# Patient Record
Sex: FEMALE | Race: WHITE | NOT HISPANIC OR LATINO | Employment: STUDENT | URBAN - METROPOLITAN AREA
[De-identification: names, ages, dates, MRNs, and addresses within clinical notes are randomized per-mention and may not be internally consistent; named-entity substitution may affect disease eponyms.]

---

## 2017-08-14 RX ORDER — VALACYCLOVIR HYDROCHLORIDE 500 MG/1
TABLET, FILM COATED ORAL
Qty: 30 TABLET | Refills: 0 | Status: SHIPPED | OUTPATIENT
Start: 2017-08-14 | End: 2018-08-23 | Stop reason: SDUPTHER

## 2018-06-21 ENCOUNTER — OFFICE VISIT (OUTPATIENT)
Dept: OPTOMETRY | Facility: CLINIC | Age: 23
End: 2018-06-21
Payer: COMMERCIAL

## 2018-06-21 DIAGNOSIS — H53.021 ANISOMETROPIC AMBLYOPIA OF RIGHT EYE: Primary | ICD-10-CM

## 2018-06-21 DIAGNOSIS — H52.31 ANISOMETROPIA: ICD-10-CM

## 2018-06-21 DIAGNOSIS — H52.13 MYOPIA WITH ASTIGMATISM, BILATERAL: ICD-10-CM

## 2018-06-21 DIAGNOSIS — H52.203 MYOPIA WITH ASTIGMATISM, BILATERAL: ICD-10-CM

## 2018-06-21 PROCEDURE — 99999 PR PBB SHADOW E&M-EST. PATIENT-LVL II: CPT | Mod: PBBFAC,,, | Performed by: OPTOMETRIST

## 2018-06-21 PROCEDURE — 92015 DETERMINE REFRACTIVE STATE: CPT | Mod: S$GLB,,, | Performed by: OPTOMETRIST

## 2018-06-21 PROCEDURE — 92014 COMPRE OPH EXAM EST PT 1/>: CPT | Mod: S$GLB,,, | Performed by: OPTOMETRIST

## 2018-06-21 RX ORDER — METHOCARBAMOL 500 MG/1
TABLET, FILM COATED ORAL
Refills: 0 | COMMUNITY
Start: 2018-05-05 | End: 2018-08-23

## 2018-06-21 RX ORDER — TIZANIDINE 4 MG/1
TABLET ORAL
Refills: 2 | COMMUNITY
Start: 2018-05-05 | End: 2022-09-09

## 2018-06-21 NOTE — PROGRESS NOTES
HPI     Ms. Michela No is here for her annual eye exam.  Pt is accompanied today by her mother.    Patient states no complaints about eyes, vision, or glasses today. She   reports clear vision all ranges with glasses.   Would patient like a refraction today? yes    (-)drops  (-)flashes  (-)floaters  (-)diplopia    OCULAR HISTORY  Last Eye Exam 08/08/16 with Dr. 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 (History)            Assessment /Plan     For exam results, see Encounter Report.    Anisometropic amblyopia of right eye  Anisometropia  Myopia with astigmatism, bilateral   Small change in refractive error OU. New glasses prescription released, adaptation expected.  New glasses optional.   Eyeglass Final Rx     Eyeglass Final Rx       Sphere Cylinder Axis    Right -4.00 +0.50 010    Left -2.00 +0.75 150    Type:  JIMBO    Expiration Date:  6/22/2019                 RTC 1 yr

## 2018-07-30 ENCOUNTER — TELEPHONE (OUTPATIENT)
Dept: ORTHOPEDICS | Facility: CLINIC | Age: 23
End: 2018-07-30

## 2018-07-30 NOTE — TELEPHONE ENCOUNTER
----- Message from Ellen Mendoza MA sent at 7/27/2018  3:07 PM CDT -----  Contact: patient      ----- Message -----  From: Tiffanie Colorado  Sent: 7/27/2018   2:28 PM  To: Ashley López Staff    Please call pt at [phone] today. Patient would like to get some medical advise from the provider only    Thank you

## 2018-08-06 RX ORDER — VALACYCLOVIR HYDROCHLORIDE 500 MG/1
TABLET, FILM COATED ORAL
Qty: 30 TABLET | Refills: 0 | OUTPATIENT
Start: 2018-08-06

## 2018-08-23 ENCOUNTER — OFFICE VISIT (OUTPATIENT)
Dept: FAMILY MEDICINE | Facility: CLINIC | Age: 23
End: 2018-08-23
Payer: COMMERCIAL

## 2018-08-23 VITALS
RESPIRATION RATE: 16 BRPM | DIASTOLIC BLOOD PRESSURE: 54 MMHG | BODY MASS INDEX: 20.58 KG/M2 | HEART RATE: 77 BPM | WEIGHT: 128.06 LBS | SYSTOLIC BLOOD PRESSURE: 102 MMHG | TEMPERATURE: 98 F | OXYGEN SATURATION: 98 % | HEIGHT: 66 IN

## 2018-08-23 DIAGNOSIS — Z23 NEED FOR HPV VACCINATION: ICD-10-CM

## 2018-08-23 DIAGNOSIS — Z11.8 SCREENING FOR CHLAMYDIAL DISEASE: ICD-10-CM

## 2018-08-23 DIAGNOSIS — A60.00 GENITAL HERPES SIMPLEX, UNSPECIFIED SITE: ICD-10-CM

## 2018-08-23 DIAGNOSIS — J06.9 UPPER RESPIRATORY TRACT INFECTION, UNSPECIFIED TYPE: Primary | ICD-10-CM

## 2018-08-23 PROCEDURE — 99999 PR PBB SHADOW E&M-EST. PATIENT-LVL IV: CPT | Mod: PBBFAC,,, | Performed by: PHYSICIAN ASSISTANT

## 2018-08-23 PROCEDURE — 90651 9VHPV VACCINE 2/3 DOSE IM: CPT | Mod: S$GLB,,, | Performed by: PHYSICIAN ASSISTANT

## 2018-08-23 PROCEDURE — 99214 OFFICE O/P EST MOD 30 MIN: CPT | Mod: 25,S$GLB,, | Performed by: PHYSICIAN ASSISTANT

## 2018-08-23 PROCEDURE — 3008F BODY MASS INDEX DOCD: CPT | Mod: CPTII,S$GLB,, | Performed by: PHYSICIAN ASSISTANT

## 2018-08-23 PROCEDURE — 90471 IMMUNIZATION ADMIN: CPT | Mod: S$GLB,,, | Performed by: PHYSICIAN ASSISTANT

## 2018-08-23 RX ORDER — VALACYCLOVIR HYDROCHLORIDE 500 MG/1
500 TABLET, FILM COATED ORAL DAILY
Qty: 30 TABLET | Refills: 0 | Status: SHIPPED | OUTPATIENT
Start: 2018-08-23 | End: 2018-10-04 | Stop reason: SDUPTHER

## 2018-08-23 RX ORDER — BENZONATATE 100 MG/1
100 CAPSULE ORAL 3 TIMES DAILY PRN
Qty: 30 CAPSULE | Refills: 0 | Status: SHIPPED | OUTPATIENT
Start: 2018-08-23 | End: 2018-09-02

## 2018-08-23 RX ORDER — METHYLPREDNISOLONE 4 MG/1
TABLET ORAL
Qty: 1 PACKAGE | Refills: 0 | Status: SHIPPED | OUTPATIENT
Start: 2018-08-23 | End: 2018-12-20

## 2018-08-23 NOTE — PROGRESS NOTES
Subjective:       Patient ID: Michela No is a 22 y.o. female.    Chief Complaint: Sinus Problem (cough, congestion)    Cough   This is a new problem. The current episode started 1 to 4 weeks ago. The problem has been unchanged. The problem occurs constantly. The cough is productive of sputum (green). Associated symptoms include nasal congestion, postnasal drip, rhinorrhea, shortness of breath and sweats. Pertinent negatives include no ear pain or fever. Treatments tried: cough syrup, PCN for 3 days BID. There is no history of asthma.     Social History     Socioeconomic History    Marital status: Single     Spouse name: Not on file    Number of children: Not on file    Years of education: Not on file    Highest education level: Not on file   Social Needs    Financial resource strain: Not on file    Food insecurity - worry: Not on file    Food insecurity - inability: Not on file    Transportation needs - medical: Not on file    Transportation needs - non-medical: Not on file   Occupational History    Not on file   Tobacco Use    Smoking status: Current Every Day Smoker     Packs/day: 0.50     Years: 6.00     Pack years: 3.00     Types: Cigarettes    Smokeless tobacco: Never Used    Tobacco comment: marijuana and cigarettes   Substance and Sexual Activity    Alcohol use: Yes     Comment: heavily    Drug use: Yes     Types: Methamphetamines, Marijuana     Comment: 4 months    Sexual activity: Not Currently     Partners: Male     Birth control/protection: None     Comment: condom during outbreak   Other Topics Concern    Patient feels they ought to cut down on drinking/drug use No    Patient annoyed by others criticizing their drinking/drug use No    Patient has felt bad or guilty about drinking/drug use No    Patient has had a drink/used drugs as an eye opener in the AM No   Social History Narrative    .She lives with both parents and younger brother. She works part[place]time [place] .Michela is now working at [employer] full time.She is living with her miriam and 2 other friends in an apt[place] the [place].       Review of Systems   Constitutional: Negative for fever.   HENT: Positive for postnasal drip and rhinorrhea. Negative for ear pain.    Respiratory: Positive for cough and shortness of breath.        Objective:      Physical Exam   Constitutional: She is oriented to person, place, and time. She appears well-developed and well-nourished.   HENT:   Head: Normocephalic and atraumatic.   Cardiovascular: Normal rate and regular rhythm.   Pulmonary/Chest: Effort normal and breath sounds normal. No stridor. No respiratory distress.   Neurological: She is alert and oriented to person, place, and time.   Skin: Skin is warm and dry. She is not diaphoretic.   Vitals reviewed.      Assessment:       1. Upper respiratory tract infection, unspecified type    2. Screening for chlamydial disease    3. Genital herpes simplex, unspecified site    4. Need for HPV vaccination        Plan:         Michela was seen today for sinus problem.    Diagnoses and all orders for this visit:    Upper respiratory tract infection, unspecified type  -     methylPREDNISolone (MEDROL DOSEPACK) 4 mg tablet; use as directed  -     benzonatate (TESSALON) 100 MG capsule; Take 1 capsule (100 mg total) by mouth 3 (three) times daily as needed for Cough.    Screening for chlamydial disease  -     Ambulatory referral to Obstetrics / Gynecology    Genital herpes simplex, unspecified site  -     valACYclovir (VALTREX) 500 MG tablet; Take 1 tablet (500 mg total) by mouth once daily.    Need for HPV vaccination  -     (In Office Administered) HPV Vaccine (9-Valent) (3 Dose) (IM)    Other orders  -     Cancel: Pneumococcal Polysaccharide Vaccine (23 Valent) (SQ/IM)

## 2018-08-23 NOTE — PROGRESS NOTES
Pt tolerated HPV vaccine to right deltoid without difficulty; no adverse reaction noted; VIS given

## 2018-09-10 ENCOUNTER — TELEPHONE (OUTPATIENT)
Dept: OBSTETRICS AND GYNECOLOGY | Facility: CLINIC | Age: 23
End: 2018-09-10

## 2018-09-10 NOTE — TELEPHONE ENCOUNTER
Tired to reach pt. To come in early due to  being out this afternoon, pt. Mom said she could not so she was advised to call and make another appt to come see us.

## 2018-10-04 DIAGNOSIS — A60.00 GENITAL HERPES SIMPLEX, UNSPECIFIED SITE: ICD-10-CM

## 2018-10-04 RX ORDER — VALACYCLOVIR HYDROCHLORIDE 500 MG/1
500 TABLET, FILM COATED ORAL DAILY
Qty: 30 TABLET | Refills: 0 | Status: SHIPPED | OUTPATIENT
Start: 2018-10-04 | End: 2018-12-03 | Stop reason: SDUPTHER

## 2018-12-03 DIAGNOSIS — A60.00 GENITAL HERPES SIMPLEX, UNSPECIFIED SITE: ICD-10-CM

## 2018-12-03 NOTE — TELEPHONE ENCOUNTER
----- Message from Azalia Mcfarland sent at 12/3/2018  4:50 PM CST -----  Contact: Self  Patient called to request medication refill. Please call at [phone]          valACYclovir (VALTREX) 500 MG tablet              [facility] [id number] [facility] [address]

## 2018-12-04 RX ORDER — VALACYCLOVIR HYDROCHLORIDE 500 MG/1
500 TABLET, FILM COATED ORAL DAILY
Qty: 30 TABLET | Refills: 0 | Status: SHIPPED | OUTPATIENT
Start: 2018-12-04 | End: 2018-12-20 | Stop reason: SDUPTHER

## 2018-12-20 ENCOUNTER — LAB VISIT (OUTPATIENT)
Dept: LAB | Facility: HOSPITAL | Age: 23
End: 2018-12-20
Payer: COMMERCIAL

## 2018-12-20 ENCOUNTER — OFFICE VISIT (OUTPATIENT)
Dept: OBSTETRICS AND GYNECOLOGY | Facility: CLINIC | Age: 23
End: 2018-12-20
Payer: COMMERCIAL

## 2018-12-20 VITALS
WEIGHT: 123 LBS | SYSTOLIC BLOOD PRESSURE: 122 MMHG | HEIGHT: 66 IN | BODY MASS INDEX: 19.77 KG/M2 | DIASTOLIC BLOOD PRESSURE: 68 MMHG

## 2018-12-20 DIAGNOSIS — A60.00 GENITAL HERPES SIMPLEX, UNSPECIFIED SITE: ICD-10-CM

## 2018-12-20 DIAGNOSIS — Z30.011 ENCOUNTER FOR INITIAL PRESCRIPTION OF CONTRACEPTIVE PILLS: ICD-10-CM

## 2018-12-20 DIAGNOSIS — Z11.3 SCREENING FOR STDS (SEXUALLY TRANSMITTED DISEASES): ICD-10-CM

## 2018-12-20 DIAGNOSIS — Z01.419 WELL WOMAN EXAM WITH ROUTINE GYNECOLOGICAL EXAM: Primary | ICD-10-CM

## 2018-12-20 LAB
HAV IGM SERPL QL IA: NEGATIVE
HBV CORE IGM SERPL QL IA: NEGATIVE
HBV SURFACE AG SERPL QL IA: NEGATIVE
HCV AB SERPL QL IA: NEGATIVE
HIV 1+2 AB+HIV1 P24 AG SERPL QL IA: NEGATIVE

## 2018-12-20 PROCEDURE — 87591 N.GONORRHOEAE DNA AMP PROB: CPT

## 2018-12-20 PROCEDURE — 86592 SYPHILIS TEST NON-TREP QUAL: CPT

## 2018-12-20 PROCEDURE — 99999 PR PBB SHADOW E&M-EST. PATIENT-LVL III: CPT | Mod: PBBFAC,,, | Performed by: NURSE PRACTITIONER

## 2018-12-20 PROCEDURE — 80074 ACUTE HEPATITIS PANEL: CPT

## 2018-12-20 PROCEDURE — 36415 COLL VENOUS BLD VENIPUNCTURE: CPT

## 2018-12-20 PROCEDURE — 99385 PREV VISIT NEW AGE 18-39: CPT | Mod: S$GLB,,, | Performed by: NURSE PRACTITIONER

## 2018-12-20 PROCEDURE — 88175 CYTOPATH C/V AUTO FLUID REDO: CPT

## 2018-12-20 PROCEDURE — 86703 HIV-1/HIV-2 1 RESULT ANTBDY: CPT

## 2018-12-20 RX ORDER — DESOGESTREL AND ETHINYL ESTRADIOL 0.15-0.03
1 KIT ORAL DAILY
Qty: 84 TABLET | Refills: 4 | Status: ON HOLD | OUTPATIENT
Start: 2018-12-20 | End: 2021-02-21 | Stop reason: HOSPADM

## 2018-12-20 RX ORDER — NAPROXEN SODIUM 550 MG/1
TABLET ORAL
Refills: 1 | COMMUNITY
Start: 2018-11-12 | End: 2022-09-09

## 2018-12-20 RX ORDER — VALACYCLOVIR HYDROCHLORIDE 500 MG/1
500 TABLET, FILM COATED ORAL DAILY
Qty: 90 TABLET | Refills: 4 | Status: SHIPPED | OUTPATIENT
Start: 2018-12-20 | End: 2019-01-07 | Stop reason: SDUPTHER

## 2018-12-20 NOTE — PROGRESS NOTES
"HISTORY OF PRESENT ILLNESS:    Michela No is a 23 y.o. female, , No LMP recorded.,  presents for a routine exam and to restart OCPs.  -States Kalina is not covered by her insurance, so ran out and is using "withdrawal" and needs Valtrex refills.   -Periods are irregular, sometimes 2-3 months apart without pills (hx PCOS).    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    Herpes simplex virus (HSV) infection     Mood disorder 2013    PCOS (polycystic ovarian syndrome)     as per pt    Personality disorder 2012    Pseudoseizures 2013    Seizures 2010    Self-harming behavior     Substance abuse        Past Surgical History:   Procedure Laterality Date    FOOT TENDON SURGERY         MEDICATIONS AND ALLERGIES:  Anaprox  Zanaflex  Valtrex    Review of patient's allergies indicates:   Allergen Reactions    Fish containing products Swelling       Family History   Adopted: Yes   Problem Relation Age of Onset    No Known Problems Mother     No Known Problems Father        Social History     Socioeconomic History    Marital status: Single     Spouse name: Not on file    Number of children: Not on file    Years of education: Not on file    Highest education level: Not on file   Social Needs    Financial resource strain: Not on file    Food insecurity - worry: Not on file    Food insecurity - inability: Not on file    Transportation needs - medical: Not on file    Transportation needs - non-medical: Not on file   Occupational History    Not on file   Tobacco Use    Smoking status: Current Every Day Smoker     Packs/day: 0.50     Years: 6.00     Pack years: 3.00     Types: Cigarettes    Smokeless tobacco: Never Used    " "Tobacco comment: marijuana and cigarettes   Substance and Sexual Activity    Alcohol use: Yes     Comment: heavily    Drug use: Yes     Types: Methamphetamines, Marijuana     Comment: 4 months    Sexual activity: Not Currently     Partners: Male     Birth control/protection: None     Comment: condom during outbreak   Other Topics Concern    Patient feels they ought to cut down on drinking/drug use No    Patient annoyed by others criticizing their drinking/drug use No    Patient has felt bad or guilty about drinking/drug use No    Patient has had a drink/used drugs as an eye opener in the AM No   Social History Narrative    .She lives with both parents and younger brother. She works part[place]time [place] .Michela is now working at [employer] full time.She is living with her fivanessae and 2 other friends in an apt[place].       OB HISTORY: None.    COMPREHENSIVE GYN HISTORY:  PAP History: Denies abnormal Paps. LAST PAP UNKNOWN.   Infection History: Reports STDs: HSV. Denies PID.  Benign History: Denies uterine fibroids. Denies ovarian cysts. Denies endometriosis. Reports PCOS.  Cancer History: Denies cervical cancer. Denies uterine cancer or hyperplasia. Denies ovarian cancer. Denies vulvar cancer or pre-cancer. Denies vaginal cancer or pre-cancer. Denies breast cancer. Denies colon cancer.  Sexual Activity History: Reports currently being sexually active  Menstrual History: Monthly. Mod then light flow.   Dysmenorrhea History: Reports mild dysmenorrhea.   Contraception: "withdrawal".    ROS:  GENERAL: No weight changes. No swelling. No fatigue. No fever.  CARDIOVASCULAR: No chest pain. No shortness of breath. No leg cramps.   NEUROLOGICAL: No headaches. No vision changes.  BREASTS: No pain. No lumps. No discharge.  ABDOMEN: No pain. No nausea. No vomiting. No diarrhea. No constipation.  REPRODUCTIVE: No abnormal bleeding.   VULVA: No pain. No lesions. No " "itching.  VAGINA: No relaxation. No itching. No odor. No discharge. No lesions.  URINARY: No incontinence. No nocturia. No frequency. No dysuria.    /68   Ht 5' 6" (1.676 m)   Wt 55.8 kg (123 lb)   BMI 19.85 kg/m²     PE:  APPEARANCE: Well nourished, well developed, in no acute distress.  AFFECT: WNL, alert and oriented x 3. ABNORMAL GAIT.  SKIN: No acne or hirsutism.  NECK: Neck symmetric, without masses or thyromegaly.  NODES: No inguinal, cervical, axillary or femoral lymph node enlargement.  CHEST: Good respiratory effort.   ABDOMEN: Soft. No tenderness or masses.   BREASTS: Symmetrical, no skin changes, visible lesions, palpable masses or nipple discharge bilaterally.  PELVIC: External female genitalia without lesions.  Female hair distribution. Adequate perineal body, Normal urethral meatus. Vagina moist and well rugated without lesions or discharge.  No significant cystocele or rectocele present. Cervix pink without lesions, discharge or tenderness. Uterus is 4-6 week size, regular, mobile and nontender. Adnexa without masses or tenderness.  EXTREMITIES: No edema    DIAGNOSIS:  1. Well woman exam with routine gynecological exam    2. Encounter for initial prescription of contraceptive pills    3. Screening for STDs (sexually transmitted diseases)    4. Genital herpes simplex, unspecified site        PLAN:    Orders Placed This Encounter    C. trachomatis/N. gonorrhoeae by AMP DNA    HIV 1/2 Ag/Ab (4th Gen)    RPR    Hepatitis panel, acute    Liquid-based pap smear, screening    desogestrel-ethinyl estradiol (APRI) 0.15-0.03 mg per tablet    valACYclovir (VALTREX) 500 MG tablet       COUNSELING:  The patient was counseled today on:  -OCP use and potential side effects;  -STDs and prevention including the Gardasil vaccine (has completed 3/3);  -A.C.S. Pap and pelvic exam guidelines (pap every 3 years);   -to follow up with her PCP for other health maintenance.    FOLLOW-UP for test results and " with Dr Camejo annually.

## 2018-12-21 LAB — RPR SER QL: NORMAL

## 2018-12-23 LAB
C TRACH DNA SPEC QL NAA+PROBE: NOT DETECTED
N GONORRHOEA DNA SPEC QL NAA+PROBE: NOT DETECTED

## 2019-01-07 DIAGNOSIS — A60.00 GENITAL HERPES SIMPLEX, UNSPECIFIED SITE: ICD-10-CM

## 2019-01-07 RX ORDER — VALACYCLOVIR HYDROCHLORIDE 500 MG/1
500 TABLET, FILM COATED ORAL DAILY
Qty: 90 TABLET | Refills: 4 | Status: ON HOLD | OUTPATIENT
Start: 2019-01-07 | End: 2021-02-21 | Stop reason: HOSPADM

## 2019-02-10 ENCOUNTER — PATIENT MESSAGE (OUTPATIENT)
Dept: OBSTETRICS AND GYNECOLOGY | Facility: HOSPITAL | Age: 24
End: 2019-02-10

## 2019-05-30 ENCOUNTER — OFFICE VISIT (OUTPATIENT)
Dept: FAMILY MEDICINE | Facility: CLINIC | Age: 24
End: 2019-05-30
Payer: COMMERCIAL

## 2019-05-30 VITALS
BODY MASS INDEX: 20.6 KG/M2 | OXYGEN SATURATION: 97 % | HEIGHT: 66 IN | WEIGHT: 128.19 LBS | HEART RATE: 96 BPM | TEMPERATURE: 98 F | DIASTOLIC BLOOD PRESSURE: 68 MMHG | SYSTOLIC BLOOD PRESSURE: 110 MMHG

## 2019-05-30 DIAGNOSIS — R09.82 PND (POST-NASAL DRIP): ICD-10-CM

## 2019-05-30 DIAGNOSIS — Z72.0 TOBACCO USE: ICD-10-CM

## 2019-05-30 DIAGNOSIS — R09.81 NASAL CONGESTION: ICD-10-CM

## 2019-05-30 DIAGNOSIS — J40 BRONCHITIS: Primary | ICD-10-CM

## 2019-05-30 PROCEDURE — 99999 PR PBB SHADOW E&M-EST. PATIENT-LVL III: CPT | Mod: PBBFAC,,, | Performed by: FAMILY MEDICINE

## 2019-05-30 PROCEDURE — 99214 PR OFFICE/OUTPT VISIT, EST, LEVL IV, 30-39 MIN: ICD-10-PCS | Mod: S$GLB,,, | Performed by: FAMILY MEDICINE

## 2019-05-30 PROCEDURE — 3008F BODY MASS INDEX DOCD: CPT | Mod: CPTII,S$GLB,, | Performed by: FAMILY MEDICINE

## 2019-05-30 PROCEDURE — 3008F PR BODY MASS INDEX (BMI) DOCUMENTED: ICD-10-PCS | Mod: CPTII,S$GLB,, | Performed by: FAMILY MEDICINE

## 2019-05-30 PROCEDURE — 99214 OFFICE O/P EST MOD 30 MIN: CPT | Mod: S$GLB,,, | Performed by: FAMILY MEDICINE

## 2019-05-30 PROCEDURE — 99999 PR PBB SHADOW E&M-EST. PATIENT-LVL III: ICD-10-PCS | Mod: PBBFAC,,, | Performed by: FAMILY MEDICINE

## 2019-05-30 RX ORDER — AZITHROMYCIN 250 MG/1
250 TABLET, FILM COATED ORAL DAILY
Qty: 6 TABLET | Refills: 0 | Status: ON HOLD | OUTPATIENT
Start: 2019-05-30 | End: 2021-02-21 | Stop reason: HOSPADM

## 2019-05-30 RX ORDER — LORATADINE 10 MG/1
10 TABLET ORAL DAILY
Qty: 30 TABLET | Refills: 2 | Status: SHIPPED | OUTPATIENT
Start: 2019-05-30 | End: 2022-09-09

## 2019-05-30 RX ORDER — BENZONATATE 200 MG/1
200 CAPSULE ORAL 3 TIMES DAILY PRN
Qty: 90 CAPSULE | Refills: 1 | Status: SHIPPED | OUTPATIENT
Start: 2019-05-30 | End: 2022-09-09

## 2019-05-30 RX ORDER — METHYLPREDNISOLONE 4 MG/1
TABLET ORAL
Qty: 1 PACKAGE | Refills: 0 | Status: SHIPPED | OUTPATIENT
Start: 2019-05-30 | End: 2022-09-09

## 2019-05-30 RX ORDER — CYCLOBENZAPRINE HCL 5 MG
TABLET ORAL
Refills: 5 | COMMUNITY
Start: 2019-03-08 | End: 2022-09-09

## 2019-05-30 RX ORDER — FLUTICASONE PROPIONATE 50 MCG
2 SPRAY, SUSPENSION (ML) NASAL DAILY
Qty: 1 BOTTLE | Refills: 2 | Status: SHIPPED | OUTPATIENT
Start: 2019-05-30 | End: 2022-09-09

## 2019-05-30 NOTE — PROGRESS NOTES
Office Visit    Patient Name: Micehla No    : 1995  MRN: [id number]      Assessment/Plan:  Michela No is a 23 y.o. female who presents today for :    Bronchitis  -     benzonatate (TESSALON) 200 MG capsule; Take 1 capsule (200 mg total) by mouth 3 (three) times daily as needed for Cough.  Dispense: 90 capsule; Refill: 1  -     azithromycin (ZITHROMAX Z-MIGUEL) 250 MG tablet; Take 1 tablet (250 mg total) by mouth once daily. Take medication as instructed on label.  Dispense: 6 tablet; Refill: 0  -     methylPREDNISolone (MEDROL DOSEPACK) 4 mg tablet; use as directed  Dispense: 1 Package; Refill: 0    PND (post-nasal drip)  -     fluticasone propionate (FLONASE) 50 mcg/actuation nasal spray; 2 sprays (100 mcg total) by Each Nare route once daily.  Dispense: 1 Bottle; Refill: 2  -     loratadine (CLARITIN) 10 mg tablet; Take 1 tablet (10 mg total) by mouth once daily.  Dispense: 30 tablet; Refill: 2    Nasal congestion    Tobacco use    -start Abx. Counseled pt that cough may persist for up to 2-3 weeks  -advised to continue supportive therapy and symptom management. May try Nasal Saline Rinses. Cepacol prn for sore throat. Claritin PRN for drainage  -advised to use air humidifier/filter at home, changing AC filters regularly, avoid smoking  -stressed hydration (water) and rest, as well as frequent hand washing and covering coughs to prevent spread of respiratory illnesses  -     Tylenol every 4-6 hours as needed for fever, headaches, sore throat, ear pain, bodyaches, and/or nasal/sinus inflammation  -RTC if Sx worsens or call clinic back if pt has any concerns.      Follow up for worsening Sx. Urgent care/ED precautions provided.     This note was created by combination of typed  and Dragon dictation.  Transcription errors may be present.  If there are any questions, please contact  me.      ----------------------------------------------------------------------------------------------------------------------      HPI:  Patient Care Team:  Elijah Beck MD as PCP - General (Family Medicine)    Michela is a 23 y.o. female with      Patient Active Problem List   Diagnosis    Personality disorder    Cerebral palsy with spastic/ataxic diplegia    Mood disorder    Pseudoseizures    Spondylolysis    Spondylisthesis    Low back pain    Genital herpes     This patient is new to me      Patient presents today for :  Sinusitis and Cough  for the past 6 days, mild productive cough of clear phlegm, +drainage. Pt denies facial pressure and pain. She does have sick contact with similar Sx. 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   Patient has not tried OTC meds at home. No sore throat.  No body aches.  No ear pain.  No F/C      Additional ROS    No dysphagia  No CP/SOB/palpitations/swelling  No nausea/vomiting/abd pain/no diarrhea  No rashes      Patient Active Problem List   Diagnosis    Personality disorder    Cerebral palsy with spastic/ataxic diplegia    Mood disorder    Pseudoseizures    Spondylolysis    Spondylisthesis    Low back pain    Genital herpes       Current Medications  Medications reviewed and updated.       Current Outpatient Medications:     cyclobenzaprine (FLEXERIL) 5 MG tablet, , Disp: , Rfl: 5    naproxen sodium (ANAPROX) 550 MG tablet, TK 1 T PO Q 12 H PRN, Disp: , Rfl: 1    tiZANidine (ZANAFLEX) 4 MG tablet, TK 2 TS PO HS, Disp: , Rfl: 2    valACYclovir (VALTREX) 500 MG tablet, Take 1 tablet (500 mg total) by mouth once daily., Disp: 90 tablet, Rfl: 4    azithromycin (ZITHROMAX Z-MIGUEL) 250 MG tablet, Take 1 tablet (250 mg total) by mouth once daily. Take medication as instructed on label., Disp: 6 tablet, Rfl: 0    benzonatate (TESSALON) 200 MG capsule, Take 1 capsule (200 mg total) by  mouth 3 (three) times daily as needed for Cough., Disp: 90 capsule, Rfl: 1    desogestrel-ethinyl estradiol (APRI) 0.15-0.03 mg per tablet, Take 1 tablet by mouth once daily., Disp: 84 tablet, Rfl: 4    fluticasone propionate (FLONASE) 50 mcg/actuation nasal spray, 2 sprays (100 mcg total) by Each Nare route once daily., Disp: 1 Bottle, Rfl: 2    loratadine (CLARITIN) 10 mg tablet, Take 1 tablet (10 mg total) by mouth once daily., Disp: 30 tablet, Rfl: 2    methylPREDNISolone (MEDROL DOSEPACK) 4 mg tablet, use as directed, Disp: 1 Package, Rfl: 0    Past Surgical History:   Procedure Laterality Date    FOOT TENDON SURGERY         Family History   Adopted: Yes   Problem Relation Age of Onset    No Known Problems Mother     No Known Problems Father        Social History     Socioeconomic History    Marital status: Single     Spouse name: Not on file    Number of children: Not on file    Years of education: Not on file    Highest education level: Not on file   Occupational History    Not on file   Social Needs    Financial resource strain: Not on file    Food insecurity:     Worry: Not on file     Inability: Not on file    Transportation needs:     Medical: Not on file     Non-medical: Not on file   Tobacco Use    Smoking status: Current Every Day Smoker     Packs/day: 0.50     Years: 6.00     Pack years: 3.00     Types: Cigarettes    Smokeless tobacco: Never Used    Tobacco comment: marijuana and cigarettes   Substance and Sexual Activity    Alcohol use: Yes     Comment: heavily    Drug use: Yes     Types: Methamphetamines, Marijuana     Comment: 4 months    Sexual activity: Not Currently     Partners: Male     Birth control/protection: None     Comment: condom during outbreak   Lifestyle    Physical activity:     Days per week: Not on file     Minutes per session: Not on file    Stress: Not on file   Relationships    Social connections:     Talks on phone: Not on file     Gets together: Not on  "file     Attends Alevism service: Not on file     Active member of club or organization: Not on file     Attends meetings of clubs or organizations: Not on file     Relationship status: Not on file   Other Topics Concern    Patient feels they ought to cut down on drinking/drug use No    Patient annoyed by others criticizing their drinking/drug use No    Patient has felt bad or guilty about drinking/drug use No    Patient has had a drink/used drugs as an eye opener in the AM No   Social History Narrative    .She lives with both parents and younger brother. She works part-time over the week ends [place] the [place] as a .Michela is now working at [employer] full time.She is living with her miriam and 2 other friends in an apt. [place] the [place].             Allergies   Review of patient's allergies indicates:   Allergen Reactions    Fish containing products Swelling    Hydrocodone              Review of Systems  See HPI      Physical Exam  /68   Pulse 96   Temp 97.7 °F (36.5 °C) (Oral)   Ht 5' 6" (1.676 m)   Wt 58.2 kg (128 lb 3.2 oz)   SpO2 97%   BMI 20.69 kg/m²       GEN: NAD, well developed  HEENT: NCAT, PERRLA, EOMI, sclera clear, anicteric, TM clear bilaterally with normal light reflex, mild nasal turbinate swelling, MMM with no lesions, O/P clear - no tonsillar swelling/discharge, +moderate drainage, +minimal clear nasal discharge, no frontal/maxillary TTP  NECK: normal, supple with midline trachea, no LAD, no thyromegaly  LUNGS: minimal scattered rhonchi bilaterally, no wheezing/rales, no increased work of breathing  HEART: RRR, normal S1 and S2, no m/r/g   ABD: s/nt/nd, NABS  SKIN: normal turgor, no rashes, no other lesions.   PSYCH: AOx3, appropriate mood and affect      "

## 2020-01-03 ENCOUNTER — PATIENT OUTREACH (OUTPATIENT)
Dept: ADMINISTRATIVE | Facility: OTHER | Age: 25
End: 2020-01-03

## 2020-01-06 ENCOUNTER — OFFICE VISIT (OUTPATIENT)
Dept: OPTOMETRY | Facility: CLINIC | Age: 25
End: 2020-01-06
Payer: COMMERCIAL

## 2020-01-06 DIAGNOSIS — H52.203 MYOPIA WITH ASTIGMATISM, BILATERAL: ICD-10-CM

## 2020-01-06 DIAGNOSIS — H52.31 ANISOMETROPIA: Primary | ICD-10-CM

## 2020-01-06 DIAGNOSIS — H52.13 MYOPIA WITH ASTIGMATISM, BILATERAL: ICD-10-CM

## 2020-01-06 PROCEDURE — 92014 PR EYE EXAM, EST PATIENT,COMPREHESV: ICD-10-PCS | Mod: S$GLB,,, | Performed by: OPTOMETRIST

## 2020-01-06 PROCEDURE — 92015 DETERMINE REFRACTIVE STATE: CPT | Mod: S$GLB,,, | Performed by: OPTOMETRIST

## 2020-01-06 PROCEDURE — 92014 COMPRE OPH EXAM EST PT 1/>: CPT | Mod: S$GLB,,, | Performed by: OPTOMETRIST

## 2020-01-06 PROCEDURE — 92015 PR REFRACTION: ICD-10-PCS | Mod: S$GLB,,, | Performed by: OPTOMETRIST

## 2020-01-06 PROCEDURE — 99999 PR PBB SHADOW E&M-EST. PATIENT-LVL II: ICD-10-PCS | Mod: PBBFAC,,, | Performed by: OPTOMETRIST

## 2020-01-06 PROCEDURE — 99999 PR PBB SHADOW E&M-EST. PATIENT-LVL II: CPT | Mod: PBBFAC,,, | Performed by: OPTOMETRIST

## 2020-01-06 NOTE — PROGRESS NOTES
HPI     25 y/o female is here today for a new glasses, last pair were broken she   works as a  and security on [employer]  Last eye exam 6/21/18 Dr Hoskins  Anisometropia amblyopia  Patching in 2 nd grade  Cerebral palsy  Floaters ou no flashes  np itching burning or tearing, except when tired  Denies diplopia    Last edited by Nella Alva on 1/6/2020 11:09 AM. (History)            Assessment /Plan     For exam results, see Encounter Report.    Anisometropia    Myopia with astigmatism, bilateral      Updated SRx. Minimal change from habitual. H/o aniso. H/o amblyopia OD with patching in the past.   Discussed possible adaptation symptoms with new SRx. Monitor yearly.       RTC in 1 year for annual eye exam or prn.

## 2020-07-09 ENCOUNTER — TELEPHONE (OUTPATIENT)
Dept: OBSTETRICS AND GYNECOLOGY | Facility: CLINIC | Age: 25
End: 2020-07-09

## 2020-07-09 ENCOUNTER — PATIENT OUTREACH (OUTPATIENT)
Dept: ADMINISTRATIVE | Facility: OTHER | Age: 25
End: 2020-07-09

## 2020-07-09 NOTE — TELEPHONE ENCOUNTER
ANDREAS for pt to call office to reschedule appointment for tomorrow at 1000 due to the fact that Dr. Bhat will be in surgery on Monday.

## 2020-07-09 NOTE — PROGRESS NOTES
Requested updates within Care Everywhere.  Patient's chart was reviewed for overdue CELIA topics.  Immunizations reconciled.

## 2020-07-10 ENCOUNTER — OFFICE VISIT (OUTPATIENT)
Dept: OBSTETRICS AND GYNECOLOGY | Facility: CLINIC | Age: 25
End: 2020-07-10
Payer: COMMERCIAL

## 2020-07-10 VITALS
WEIGHT: 116.88 LBS | DIASTOLIC BLOOD PRESSURE: 72 MMHG | BODY MASS INDEX: 18.86 KG/M2 | SYSTOLIC BLOOD PRESSURE: 100 MMHG

## 2020-07-10 DIAGNOSIS — F19.11 HISTORY OF SUBSTANCE ABUSE: ICD-10-CM

## 2020-07-10 DIAGNOSIS — F19.10 POLYSUBSTANCE ABUSE: ICD-10-CM

## 2020-07-10 DIAGNOSIS — G80.2 SPASTIC HEMIPLEGIC CEREBRAL PALSY: ICD-10-CM

## 2020-07-10 DIAGNOSIS — Z72.0 TOBACCO ABUSE: ICD-10-CM

## 2020-07-10 DIAGNOSIS — Z32.01 PREGNANCY CONFIRMED BY POSITIVE URINE TEST: Primary | ICD-10-CM

## 2020-07-10 DIAGNOSIS — Z11.3 SCREEN FOR STD (SEXUALLY TRANSMITTED DISEASE): ICD-10-CM

## 2020-07-10 DIAGNOSIS — A60.00 GENITAL HERPES SIMPLEX, UNSPECIFIED SITE: ICD-10-CM

## 2020-07-10 DIAGNOSIS — F44.5 PSEUDOSEIZURES: Chronic | ICD-10-CM

## 2020-07-10 PROCEDURE — 87480 CANDIDA DNA DIR PROBE: CPT

## 2020-07-10 PROCEDURE — 99999 PR PBB SHADOW E&M-EST. PATIENT-LVL III: ICD-10-PCS | Mod: PBBFAC,,, | Performed by: OBSTETRICS & GYNECOLOGY

## 2020-07-10 PROCEDURE — 99214 PR OFFICE/OUTPT VISIT, EST, LEVL IV, 30-39 MIN: ICD-10-PCS | Mod: S$GLB,,, | Performed by: OBSTETRICS & GYNECOLOGY

## 2020-07-10 PROCEDURE — 99999 PR PBB SHADOW E&M-EST. PATIENT-LVL III: CPT | Mod: PBBFAC,,, | Performed by: OBSTETRICS & GYNECOLOGY

## 2020-07-10 PROCEDURE — 80307 DRUG TEST PRSMV CHEM ANLYZR: CPT

## 2020-07-10 PROCEDURE — 99214 OFFICE O/P EST MOD 30 MIN: CPT | Mod: S$GLB,,, | Performed by: OBSTETRICS & GYNECOLOGY

## 2020-07-10 PROCEDURE — 87491 CHLMYD TRACH DNA AMP PROBE: CPT

## 2020-07-10 PROCEDURE — 3008F BODY MASS INDEX DOCD: CPT | Mod: CPTII,S$GLB,, | Performed by: OBSTETRICS & GYNECOLOGY

## 2020-07-10 PROCEDURE — 3008F PR BODY MASS INDEX (BMI) DOCUMENTED: ICD-10-PCS | Mod: CPTII,S$GLB,, | Performed by: OBSTETRICS & GYNECOLOGY

## 2020-07-10 PROCEDURE — 87510 GARDNER VAG DNA DIR PROBE: CPT

## 2020-07-10 RX ORDER — FOLIC ACID 1 MG/1
4 TABLET ORAL DAILY
Qty: 120 TABLET | Refills: 7 | Status: SHIPPED | OUTPATIENT
Start: 2020-07-10 | End: 2022-09-09

## 2020-07-10 NOTE — PROGRESS NOTES
SUBJECTIVE:   24 y.o. female   for pregnancy confirmation    Patient's last menstrual period was 2020..    Pt with positive UPT at home about 1 week ago,   Not planned pregnancy but very excited, thought she could not get pregnancy due to h/o pcos and retroverted uterus  Pt with h/o genital herpes  And CP due to trauma as new born, she also has seizure d/o but not on anything for seizure  She is seeing her neurologist at [facility] on routine basis (next visit in 1-2 weeks) and get botox injection for her spaticity.  Last seizure was about 3-4 months ago per pt.  Pt also used MJ and amphetamine (about 1 g/week) and smokes cigarette about 1ppd.  Since she found out she is pregnant, she has been cutting everything down on her own  Pt had spotting x 1 episode about 2-3 weeks ago.  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    Herpes simplex virus (HSV) infection     Mood disorder 2013    PCOS (polycystic ovarian syndrome)     as per pt    Personality disorder 2012    Pseudoseizures 2013    Seizures 2010    Self-harming behavior     Substance abuse      Past Surgical History:   Procedure Laterality Date    FOOT TENDON SURGERY       Social History     Socioeconomic History    Marital status: Single     Spouse name: Not on file    Number of children: Not on file    Years of education: Not on file    Highest education level: Not on file   Occupational History    Not on file    Social Needs    Financial resource strain: Not on file    Food insecurity     Worry: Not on file     Inability: Not on file    Transportation needs     Medical: Not on file     Non-medical: Not on file   Tobacco Use    Smoking status: Current Every Day Smoker     Packs/day: 0.50     Years: 6.00     Pack years: 3.00     Types: Cigarettes    Smokeless tobacco: Never Used    Tobacco comment: marijuana and cigarettes   Substance and Sexual Activity    Alcohol use: Yes     Comment: heavily    Drug use: Yes     Types: Methamphetamines, Marijuana     Comment: 4 months    Sexual activity: Not Currently     Partners: Male     Birth control/protection: None     Comment: condom during outbreak   Lifestyle    Physical activity     Days per week: Not on file     Minutes per session: Not on file    Stress: Not on file   Relationships    Social connections     Talks on phone: Not on file     Gets together: Not on file     Attends Rastafari service: Not on file     Active member of club or organization: Not on file     Attends meetings of clubs or organizations: Not on file     Relationship status: Not on file   Other Topics Concern    Patient feels they ought to cut down on drinking/drug use No    Patient annoyed by others criticizing their drinking/drug use No    Patient has felt bad or guilty about drinking/drug use No    Patient has had a drink/used drugs as an eye opener in the AM No   Social History Narrative    .She lives with both parents and younger brother. [place] .Michela is now working at [employer] full time.She is living with her fiancee and 2 other friends in [place]     Family History   Adopted: Yes   Problem Relation Age of Onset    No Known Problems Mother     No Known Problems Father     Glaucoma Neg Hx          Current Outpatient Medications   Medication Sig Dispense Refill    valACYclovir (VALTREX) 500 MG  tablet Take 1 tablet (500 mg total) by mouth once daily. 90 tablet 4    azithromycin (ZITHROMAX Z-MIGUEL) 250 MG tablet Take 1 tablet (250 mg total) by mouth once daily. Take medication as instructed on label. (Patient not taking: Reported on 1/6/2020) 6 tablet 0    benzonatate (TESSALON) 200 MG capsule Take 1 capsule (200 mg total) by mouth 3 (three) times daily as needed for Cough. (Patient not taking: Reported on 1/6/2020) 90 capsule 1    cyclobenzaprine (FLEXERIL) 5 MG tablet   5    desogestrel-ethinyl estradiol (APRI) 0.15-0.03 mg per tablet Take 1 tablet by mouth once daily. 84 tablet 4    fluticasone propionate (FLONASE) 50 mcg/actuation nasal spray 2 sprays (100 mcg total) by Each Nare route once daily. (Patient not taking: Reported on 1/6/2020) 1 Bottle 2    folic acid (FOLVITE) 1 MG tablet Take 4 tablets (4 mg total) by mouth once daily. 120 tablet 7    loratadine (CLARITIN) 10 mg tablet Take 1 tablet (10 mg total) by mouth once daily. (Patient not taking: Reported on 1/6/2020) 30 tablet 2    methylPREDNISolone (MEDROL DOSEPACK) 4 mg tablet use as directed (Patient not taking: Reported on 1/6/2020) 1 Package 0    naproxen sodium (ANAPROX) 550 MG tablet TK 1 T PO Q 12 H PRN  1    tiZANidine (ZANAFLEX) 4 MG tablet TK 2 TS PO HS  2     No current facility-administered medications for this visit.      Allergies: Fish containing products and Hydrocodone     ROS:  GENERAL: Denies weight gain or weight loss. Feeling well overall.   SKIN: Denies rash or lesions.   HEAD: Denies head injury or headache.   NODES: Denies enlarged lymph nodes.   CHEST: Denies chest pain or shortness of breath.   CARDIOVASCULAR: Denies palpitations or left sided chest pain.   ABDOMEN: No abdominal pain, constipation, diarrhea, nausea, vomiting or rectal bleeding.   URINARY: No frequency, dysuria, hematuria, or burning on urination.  REPRODUCTIVE: see HPI  BREASTS: The patient performs breast self-examination and denies pain,  lumps, or nipple discharge.   HEMATOLOGIC: No easy bruisability or excessive bleeding.  MUSCULOSKELETAL: see HPI  NEUROLOGIC: Denies syncope or weakness.   PSYCHIATRIC: Denies depression, anxiety or mood swings.      OBJECTIVE:   /72   Wt 53 kg (116 lb 13.5 oz)   LMP 04/29/2020   BMI 18.86 kg/m²   The patient appears well, alert, oriented x 3, in no distress. Poor hygienes  NECK: negative, no thyromegaly, trachea midline  SKIN: normal, good color, good turgor and no acne, striae, hirsutism  BREAST EXAM: not examined  ABDOMEN: soft, non-tender; bowel sounds normal; no masses,  no organomegaly and no hernias, masses, or hepatosplenomegaly  BLADDER: soft  GENITALIA: normal external genitalia, no erythema, no discharge  URETHRA: normal appearing urethra with no masses, tenderness or lesions and normal urethra, normal urethral meatus  VAGINA: Normal  CERVIX: no lesions or cervical motion tenderness  UTERUS: retroverted  ADNEXA: no mass, fullness, tenderness    Limited abdominal US:  Small GS seen, likely in utero as imaging is poor.  No CRL seen    ASSESSMENT:   1.  Pregnancy confirmed by urine:  Continue PNV.  Discussed vit B6 and unisome  Gc/ct and affirm done. Pap UTD.  rx for folic acid.  Dating US with MFM.  Bleeding precautions given  2.  Seizure d/o:  Pt to f/u with neurologist asap.  rx for folic acid 4mg daily sent  3.  CP:  Pt to f/u with neurologist to see if necessary to continue with botox in pregnancy.  On zanaflex (cat C).  Pt to discuss with Neuro as well  4.  Polysubstance abuse:  utox today.  Advised to stop as it can adversely affect her pregnancy  5.  Tobacco abuse:  Discussed cessation. Discussed adverse effect of pregnancy associated with smoking  6.  Genital herpes:  Need valtrex suppression at 35-36 week  7.  F/u in 4 weeks for initial OB visit

## 2020-07-11 LAB
AMPHET+METHAMPHET UR QL: NORMAL
BARBITURATES UR QL SCN>200 NG/ML: NEGATIVE
BENZODIAZ UR QL SCN>200 NG/ML: NEGATIVE
BZE UR QL SCN: NEGATIVE
CANNABINOIDS UR QL SCN: NORMAL
CREAT UR-MCNC: 155 MG/DL (ref 15–325)
ETHANOL UR-MCNC: <10 MG/DL
METHADONE UR QL SCN>300 NG/ML: NEGATIVE
OPIATES UR QL SCN: NEGATIVE
PCP UR QL SCN>25 NG/ML: NEGATIVE
TOXICOLOGY INFORMATION: NORMAL

## 2020-07-15 LAB
C TRACH DNA SPEC QL NAA+PROBE: NOT DETECTED
CANDIDA RRNA VAG QL PROBE: NOT DETECTED
G VAGINALIS RRNA GENITAL QL PROBE: NOT DETECTED
N GONORRHOEA DNA SPEC QL NAA+PROBE: NOT DETECTED
T VAGINALIS RRNA GENITAL QL PROBE: NOT DETECTED

## 2020-07-31 ENCOUNTER — PROCEDURE VISIT (OUTPATIENT)
Dept: MATERNAL FETAL MEDICINE | Facility: CLINIC | Age: 25
End: 2020-07-31
Payer: COMMERCIAL

## 2020-07-31 DIAGNOSIS — Z32.01 PREGNANCY CONFIRMED BY POSITIVE URINE TEST: ICD-10-CM

## 2020-07-31 PROCEDURE — 76801 PR US, OB <14WKS, TRANSABD, SINGLE GESTATION: ICD-10-PCS | Mod: S$GLB,,, | Performed by: OBSTETRICS & GYNECOLOGY

## 2020-07-31 PROCEDURE — 76801 OB US < 14 WKS SINGLE FETUS: CPT | Mod: S$GLB,,, | Performed by: OBSTETRICS & GYNECOLOGY

## 2020-08-05 ENCOUNTER — PATIENT OUTREACH (OUTPATIENT)
Dept: ADMINISTRATIVE | Facility: OTHER | Age: 25
End: 2020-08-05

## 2020-08-07 ENCOUNTER — INITIAL PRENATAL (OUTPATIENT)
Dept: OBSTETRICS AND GYNECOLOGY | Facility: CLINIC | Age: 25
End: 2020-08-07
Payer: COMMERCIAL

## 2020-08-07 VITALS — DIASTOLIC BLOOD PRESSURE: 62 MMHG | WEIGHT: 124.56 LBS | SYSTOLIC BLOOD PRESSURE: 110 MMHG | BODY MASS INDEX: 20.1 KG/M2

## 2020-08-07 DIAGNOSIS — Z3A.10 10 WEEKS GESTATION OF PREGNANCY: ICD-10-CM

## 2020-08-07 DIAGNOSIS — Z34.01 SUPERVISION OF NORMAL FIRST PREGNANCY IN FIRST TRIMESTER: Primary | ICD-10-CM

## 2020-08-07 PROCEDURE — 99999 PR PBB SHADOW E&M-EST. PATIENT-LVL III: ICD-10-PCS | Mod: PBBFAC,,, | Performed by: OBSTETRICS & GYNECOLOGY

## 2020-08-07 PROCEDURE — 87086 URINE CULTURE/COLONY COUNT: CPT

## 2020-08-07 PROCEDURE — 87147 CULTURE TYPE IMMUNOLOGIC: CPT

## 2020-08-07 PROCEDURE — 87088 URINE BACTERIA CULTURE: CPT

## 2020-08-07 PROCEDURE — 80307 DRUG TEST PRSMV CHEM ANLYZR: CPT

## 2020-08-07 PROCEDURE — 0500F PR INITIAL PRENATAL CARE VISIT: ICD-10-PCS | Mod: S$GLB,,, | Performed by: OBSTETRICS & GYNECOLOGY

## 2020-08-07 PROCEDURE — 0500F INITIAL PRENATAL CARE VISIT: CPT | Mod: S$GLB,,, | Performed by: OBSTETRICS & GYNECOLOGY

## 2020-08-07 PROCEDURE — 99999 PR PBB SHADOW E&M-EST. PATIENT-LVL III: CPT | Mod: PBBFAC,,, | Performed by: OBSTETRICS & GYNECOLOGY

## 2020-08-07 NOTE — PROGRESS NOTES
10w3d. Pt reports no complaints.  Admits to drug use, but reports that she is now clean.  Denies contractions, vaginal bleeding, or leakage of fluid.   Trisomy screening:counseled on Maternity 21  F/U 4 weeks. Next visit: routine care     1. Supervision of normal first pregnancy in first trimester  - Cystic Fibrosis Mutation Panel; Future  - HIV 1/2 Ag/Ab (4th Gen); Future  - RPR; Future  - Hepatitis B surface antigen; Future  - Type & Screen; Future  - Rubella antibody, IgG; Future  - CBC auto differential; Future  - Basic metabolic panel; Future  - PNV,calcium 72-iron-folic acid (PRENATAL VITAMIN PLUS LOW IRON) 27 mg iron- 1 mg Tab; Take one tablet daily.  Prescribe prenatal covered by insurance  Dispense: 90 tablet; Refill: 11  - Urine culture  - Toxicology screen, urine

## 2020-08-08 LAB
AMPHET+METHAMPHET UR QL: NEGATIVE
BARBITURATES UR QL SCN>200 NG/ML: NEGATIVE
BENZODIAZ UR QL SCN>200 NG/ML: NEGATIVE
BZE UR QL SCN: NEGATIVE
CANNABINOIDS UR QL SCN: NORMAL
CREAT UR-MCNC: 91 MG/DL (ref 15–325)
ETHANOL UR-MCNC: <10 MG/DL
METHADONE UR QL SCN>300 NG/ML: NEGATIVE
OPIATES UR QL SCN: NEGATIVE
PCP UR QL SCN>25 NG/ML: NEGATIVE
TOXICOLOGY INFORMATION: NORMAL

## 2020-08-09 LAB — BACTERIA UR CULT: ABNORMAL

## 2020-08-10 ENCOUNTER — PATIENT MESSAGE (OUTPATIENT)
Dept: OBSTETRICS AND GYNECOLOGY | Facility: CLINIC | Age: 25
End: 2020-08-10

## 2020-08-10 DIAGNOSIS — R82.71 GBS BACTERIURIA: Primary | ICD-10-CM

## 2020-08-10 RX ORDER — CEPHALEXIN 500 MG/1
500 CAPSULE ORAL EVERY 12 HOURS
Qty: 20 CAPSULE | Refills: 0 | Status: SHIPPED | OUTPATIENT
Start: 2020-08-10 | End: 2020-08-20

## 2020-09-04 ENCOUNTER — ROUTINE PRENATAL (OUTPATIENT)
Dept: OBSTETRICS AND GYNECOLOGY | Facility: CLINIC | Age: 25
End: 2020-09-04
Payer: COMMERCIAL

## 2020-09-04 VITALS
DIASTOLIC BLOOD PRESSURE: 64 MMHG | SYSTOLIC BLOOD PRESSURE: 112 MMHG | BODY MASS INDEX: 20.32 KG/M2 | WEIGHT: 125.88 LBS

## 2020-09-04 DIAGNOSIS — A60.09 GENITAL HERPES SIMPLEX VIRUS (HSV) INFECTION IN MOTHER AFFECTING PREGNANCY: ICD-10-CM

## 2020-09-04 DIAGNOSIS — Z3A.14 14 WEEKS GESTATION OF PREGNANCY: ICD-10-CM

## 2020-09-04 DIAGNOSIS — O98.319 GENITAL HERPES SIMPLEX VIRUS (HSV) INFECTION IN MOTHER AFFECTING PREGNANCY: ICD-10-CM

## 2020-09-04 DIAGNOSIS — O99.820 GBS (GROUP B STREPTOCOCCUS CARRIER), +RV CULTURE, CURRENTLY PREGNANT: Primary | ICD-10-CM

## 2020-09-04 PROCEDURE — 0502F PR SUBSEQUENT PRENATAL CARE: ICD-10-PCS | Mod: CPTII,S$GLB,, | Performed by: OBSTETRICS & GYNECOLOGY

## 2020-09-04 PROCEDURE — 80307 DRUG TEST PRSMV CHEM ANLYZR: CPT

## 2020-09-04 PROCEDURE — 87086 URINE CULTURE/COLONY COUNT: CPT

## 2020-09-04 PROCEDURE — 99999 PR PBB SHADOW E&M-EST. PATIENT-LVL III: CPT | Mod: PBBFAC,,, | Performed by: OBSTETRICS & GYNECOLOGY

## 2020-09-04 PROCEDURE — 0502F SUBSEQUENT PRENATAL CARE: CPT | Mod: CPTII,S$GLB,, | Performed by: OBSTETRICS & GYNECOLOGY

## 2020-09-04 PROCEDURE — 99999 PR PBB SHADOW E&M-EST. PATIENT-LVL III: ICD-10-PCS | Mod: PBBFAC,,, | Performed by: OBSTETRICS & GYNECOLOGY

## 2020-09-04 RX ORDER — VALACYCLOVIR HYDROCHLORIDE 1 G/1
1000 TABLET, FILM COATED ORAL DAILY
Qty: 90 TABLET | Refills: 3 | Status: ON HOLD | OUTPATIENT
Start: 2020-09-04 | End: 2021-02-21 | Stop reason: HOSPADM

## 2020-09-04 NOTE — PROGRESS NOTES
14w3d. Pt reports no complaints.  Admits to drug use, but reports that she is now clean.  Denies contractions, vaginal bleeding, or leakage of fluid.   Trisomy screening:CFDNA ordered today.  F/U 4 weeks. Next visit: routine care     1. GBS (group B Streptococcus carrier), +RV culture, currently pregnant  - Urine culture  - Toxicology screen, urine    2. Genital herpes simplex virus (HSV) infection in mother affecting pregnancy  - valACYclovir (VALTREX) 1000 MG tablet; Take 1 tablet (1,000 mg total) by mouth once daily.  Dispense: 90 tablet; Refill: 3    3. 14 weeks gestation of pregnancy

## 2020-09-05 LAB
AMPHET+METHAMPHET UR QL: NEGATIVE
BARBITURATES UR QL SCN>200 NG/ML: NEGATIVE
BENZODIAZ UR QL SCN>200 NG/ML: NEGATIVE
BZE UR QL SCN: NEGATIVE
CANNABINOIDS UR QL SCN: NORMAL
CREAT UR-MCNC: 50 MG/DL (ref 15–325)
ETHANOL UR-MCNC: <10 MG/DL
METHADONE UR QL SCN>300 NG/ML: NEGATIVE
OPIATES UR QL SCN: NEGATIVE
PCP UR QL SCN>25 NG/ML: NEGATIVE
TOXICOLOGY INFORMATION: NORMAL

## 2020-09-06 LAB — BACTERIA UR CULT: NORMAL

## 2020-10-05 ENCOUNTER — PATIENT MESSAGE (OUTPATIENT)
Dept: ADMINISTRATIVE | Facility: HOSPITAL | Age: 25
End: 2020-10-05

## 2020-10-06 ENCOUNTER — LAB VISIT (OUTPATIENT)
Dept: LAB | Facility: HOSPITAL | Age: 25
End: 2020-10-06
Attending: OBSTETRICS & GYNECOLOGY
Payer: COMMERCIAL

## 2020-10-06 ENCOUNTER — ROUTINE PRENATAL (OUTPATIENT)
Dept: OBSTETRICS AND GYNECOLOGY | Facility: CLINIC | Age: 25
End: 2020-10-06
Payer: COMMERCIAL

## 2020-10-06 VITALS — WEIGHT: 140 LBS | BODY MASS INDEX: 22.6 KG/M2 | SYSTOLIC BLOOD PRESSURE: 118 MMHG | DIASTOLIC BLOOD PRESSURE: 54 MMHG

## 2020-10-06 DIAGNOSIS — A60.09 GENITAL HERPES SIMPLEX VIRUS (HSV) INFECTION IN MOTHER AFFECTING PREGNANCY: ICD-10-CM

## 2020-10-06 DIAGNOSIS — Z3A.19 19 WEEKS GESTATION OF PREGNANCY: ICD-10-CM

## 2020-10-06 DIAGNOSIS — Z34.01 SUPERVISION OF NORMAL FIRST PREGNANCY IN FIRST TRIMESTER: ICD-10-CM

## 2020-10-06 DIAGNOSIS — O99.820 GBS (GROUP B STREPTOCOCCUS CARRIER), +RV CULTURE, CURRENTLY PREGNANT: Primary | ICD-10-CM

## 2020-10-06 DIAGNOSIS — O98.319 GENITAL HERPES SIMPLEX VIRUS (HSV) INFECTION IN MOTHER AFFECTING PREGNANCY: ICD-10-CM

## 2020-10-06 LAB
ABO + RH BLD: NORMAL
ANION GAP SERPL CALC-SCNC: 10 MMOL/L (ref 8–16)
BASOPHILS # BLD AUTO: 0.05 K/UL (ref 0–0.2)
BASOPHILS NFR BLD: 0.4 % (ref 0–1.9)
BLD GP AB SCN CELLS X3 SERPL QL: NORMAL
BUN SERPL-MCNC: 6 MG/DL (ref 6–20)
CALCIUM SERPL-MCNC: 9 MG/DL (ref 8.7–10.5)
CHLORIDE SERPL-SCNC: 105 MMOL/L (ref 95–110)
CO2 SERPL-SCNC: 20 MMOL/L (ref 23–29)
CREAT SERPL-MCNC: 0.5 MG/DL (ref 0.5–1.4)
DIFFERENTIAL METHOD: ABNORMAL
EOSINOPHIL # BLD AUTO: 0.2 K/UL (ref 0–0.5)
EOSINOPHIL NFR BLD: 1.3 % (ref 0–8)
ERYTHROCYTE [DISTWIDTH] IN BLOOD BY AUTOMATED COUNT: 12.1 % (ref 11.5–14.5)
EST. GFR  (AFRICAN AMERICAN): >60 ML/MIN/1.73 M^2
EST. GFR  (NON AFRICAN AMERICAN): >60 ML/MIN/1.73 M^2
GLUCOSE SERPL-MCNC: 91 MG/DL (ref 70–110)
HCT VFR BLD AUTO: 32.9 % (ref 37–48.5)
HGB BLD-MCNC: 11.2 G/DL (ref 12–16)
IMM GRANULOCYTES # BLD AUTO: 0.1 K/UL (ref 0–0.04)
IMM GRANULOCYTES NFR BLD AUTO: 0.8 % (ref 0–0.5)
LYMPHOCYTES # BLD AUTO: 1.8 K/UL (ref 1–4.8)
LYMPHOCYTES NFR BLD: 14 % (ref 18–48)
MCH RBC QN AUTO: 32.9 PG (ref 27–31)
MCHC RBC AUTO-ENTMCNC: 34 G/DL (ref 32–36)
MCV RBC AUTO: 97 FL (ref 82–98)
MONOCYTES # BLD AUTO: 0.9 K/UL (ref 0.3–1)
MONOCYTES NFR BLD: 7.4 % (ref 4–15)
NEUTROPHILS # BLD AUTO: 9.5 K/UL (ref 1.8–7.7)
NEUTROPHILS NFR BLD: 76.1 % (ref 38–73)
NRBC BLD-RTO: 0 /100 WBC
PLATELET # BLD AUTO: 219 K/UL (ref 150–350)
PMV BLD AUTO: 8.7 FL (ref 9.2–12.9)
POTASSIUM SERPL-SCNC: 3.7 MMOL/L (ref 3.5–5.1)
RBC # BLD AUTO: 3.4 M/UL (ref 4–5.4)
RH BLD: NORMAL
SODIUM SERPL-SCNC: 135 MMOL/L (ref 136–145)
WBC # BLD AUTO: 12.49 K/UL (ref 3.9–12.7)

## 2020-10-06 PROCEDURE — 80048 BASIC METABOLIC PNL TOTAL CA: CPT

## 2020-10-06 PROCEDURE — 0502F PR SUBSEQUENT PRENATAL CARE: ICD-10-PCS | Mod: CPTII,S$GLB,, | Performed by: OBSTETRICS & GYNECOLOGY

## 2020-10-06 PROCEDURE — 36415 COLL VENOUS BLD VENIPUNCTURE: CPT

## 2020-10-06 PROCEDURE — 99999 PR PBB SHADOW E&M-EST. PATIENT-LVL III: ICD-10-PCS | Mod: PBBFAC,,, | Performed by: OBSTETRICS & GYNECOLOGY

## 2020-10-06 PROCEDURE — 81220 CFTR GENE COM VARIANTS: CPT

## 2020-10-06 PROCEDURE — 86762 RUBELLA ANTIBODY: CPT

## 2020-10-06 PROCEDURE — 85025 COMPLETE CBC W/AUTO DIFF WBC: CPT

## 2020-10-06 PROCEDURE — 86850 RBC ANTIBODY SCREEN: CPT

## 2020-10-06 PROCEDURE — 99999 PR PBB SHADOW E&M-EST. PATIENT-LVL III: CPT | Mod: PBBFAC,,, | Performed by: OBSTETRICS & GYNECOLOGY

## 2020-10-06 PROCEDURE — 86901 BLOOD TYPING SEROLOGIC RH(D): CPT

## 2020-10-06 PROCEDURE — 0502F SUBSEQUENT PRENATAL CARE: CPT | Mod: CPTII,S$GLB,, | Performed by: OBSTETRICS & GYNECOLOGY

## 2020-10-06 PROCEDURE — 86703 HIV-1/HIV-2 1 RESULT ANTBDY: CPT

## 2020-10-06 PROCEDURE — 86592 SYPHILIS TEST NON-TREP QUAL: CPT

## 2020-10-06 PROCEDURE — 87340 HEPATITIS B SURFACE AG IA: CPT

## 2020-10-06 NOTE — PROGRESS NOTES
19w0d  Pt reports no complaints.  Denies contractions, vaginal bleeding, or leakage of fluid.   Trisomy screening:CFDNA nml - male  Contraception - Vasectomy/condoms  Pediatrician - counseled on [facility].  F/U 4 weeks. Next visit: routine care

## 2020-10-07 LAB
RPR SER QL: NORMAL
RUBV IGG SER-ACNC: 15.9 IU/ML
RUBV IGG SER-IMP: REACTIVE

## 2020-10-08 LAB
CFTR MUT ANL BLD/T: NORMAL
HBV SURFACE AG SERPL QL IA: NEGATIVE
HIV 1+2 AB+HIV1 P24 AG SERPL QL IA: NEGATIVE

## 2020-10-22 ENCOUNTER — PROCEDURE VISIT (OUTPATIENT)
Dept: MATERNAL FETAL MEDICINE | Facility: CLINIC | Age: 25
End: 2020-10-22
Payer: COMMERCIAL

## 2020-10-22 DIAGNOSIS — Z36.89 ENCOUNTER FOR FETAL ANATOMIC SURVEY: ICD-10-CM

## 2020-10-22 DIAGNOSIS — O99.820 GBS (GROUP B STREPTOCOCCUS CARRIER), +RV CULTURE, CURRENTLY PREGNANT: ICD-10-CM

## 2020-10-22 PROCEDURE — 76805 OB US >/= 14 WKS SNGL FETUS: CPT | Mod: 26,S$GLB,, | Performed by: OBSTETRICS & GYNECOLOGY

## 2020-10-22 PROCEDURE — 76805 PR US, OB 14+WKS, TRANSABD, SINGLE GESTATION: ICD-10-PCS | Mod: 26,S$GLB,, | Performed by: OBSTETRICS & GYNECOLOGY

## 2020-10-26 ENCOUNTER — PATIENT MESSAGE (OUTPATIENT)
Dept: OBSTETRICS AND GYNECOLOGY | Facility: CLINIC | Age: 25
End: 2020-10-26

## 2020-11-18 DIAGNOSIS — O26.92 COMPLICATION OF PREGNANCY IN SECOND TRIMESTER: Primary | ICD-10-CM

## 2020-11-19 ENCOUNTER — PATIENT MESSAGE (OUTPATIENT)
Dept: OBSTETRICS AND GYNECOLOGY | Facility: CLINIC | Age: 25
End: 2020-11-19

## 2020-11-19 ENCOUNTER — PROCEDURE VISIT (OUTPATIENT)
Dept: MATERNAL FETAL MEDICINE | Facility: CLINIC | Age: 25
End: 2020-11-19
Payer: COMMERCIAL

## 2020-11-19 DIAGNOSIS — O26.92 COMPLICATION OF PREGNANCY IN SECOND TRIMESTER: ICD-10-CM

## 2020-11-19 PROCEDURE — 76816 OB US FOLLOW-UP PER FETUS: CPT | Mod: S$GLB,,, | Performed by: OBSTETRICS & GYNECOLOGY

## 2020-11-19 PROCEDURE — 76816 PR  US,PREGNANT UTERUS,F/U,TRANSABD APP: ICD-10-PCS | Mod: S$GLB,,, | Performed by: OBSTETRICS & GYNECOLOGY

## 2021-01-05 ENCOUNTER — PATIENT MESSAGE (OUTPATIENT)
Dept: ADMINISTRATIVE | Facility: HOSPITAL | Age: 26
End: 2021-01-05

## 2021-01-29 ENCOUNTER — PATIENT MESSAGE (OUTPATIENT)
Dept: OBSTETRICS AND GYNECOLOGY | Facility: CLINIC | Age: 26
End: 2021-01-29

## 2021-01-29 ENCOUNTER — ROUTINE PRENATAL (OUTPATIENT)
Dept: OBSTETRICS AND GYNECOLOGY | Facility: CLINIC | Age: 26
End: 2021-01-29
Payer: MEDICAID

## 2021-01-29 ENCOUNTER — HOSPITAL ENCOUNTER (OUTPATIENT)
Dept: OBSTETRICS AND GYNECOLOGY | Facility: HOSPITAL | Age: 26
Discharge: HOME | End: 2021-01-29
Attending: OBSTETRICS & GYNECOLOGY | Admitting: OBSTETRICS & GYNECOLOGY
Payer: MEDICAID

## 2021-01-29 ENCOUNTER — CLINICAL SUPPORT (OUTPATIENT)
Dept: OBSTETRICS AND GYNECOLOGY | Facility: CLINIC | Age: 26
End: 2021-01-29
Payer: MEDICAID

## 2021-01-29 VITALS
BODY MASS INDEX: 27.04 KG/M2 | WEIGHT: 167.56 LBS | SYSTOLIC BLOOD PRESSURE: 119 MMHG | DIASTOLIC BLOOD PRESSURE: 56 MMHG | BODY MASS INDEX: 26.58 KG/M2 | WEIGHT: 164.69 LBS

## 2021-01-29 DIAGNOSIS — O99.820 GBS (GROUP B STREPTOCOCCUS CARRIER), +RV CULTURE, CURRENTLY PREGNANT: Primary | ICD-10-CM

## 2021-01-29 DIAGNOSIS — O98.319 GENITAL HERPES SIMPLEX VIRUS (HSV) INFECTION IN MOTHER AFFECTING PREGNANCY: ICD-10-CM

## 2021-01-29 DIAGNOSIS — A60.09 GENITAL HERPES SIMPLEX VIRUS (HSV) INFECTION IN MOTHER AFFECTING PREGNANCY: ICD-10-CM

## 2021-01-29 DIAGNOSIS — Z67.91 RH NEGATIVE STATE IN ANTEPARTUM PERIOD, THIRD TRIMESTER: ICD-10-CM

## 2021-01-29 DIAGNOSIS — O26.893 RH NEGATIVE STATE IN ANTEPARTUM PERIOD, THIRD TRIMESTER: ICD-10-CM

## 2021-01-29 DIAGNOSIS — Z23 NEED FOR DIPHTHERIA-TETANUS-PERTUSSIS (TDAP) VACCINE: Primary | ICD-10-CM

## 2021-01-29 DIAGNOSIS — Z3A.35 35 WEEKS GESTATION OF PREGNANCY: ICD-10-CM

## 2021-01-29 LAB — INJECT RH IG VOL PATIENT: NORMAL ML

## 2021-01-29 PROCEDURE — 99999 PR PBB SHADOW E&M-EST. PATIENT-LVL III: ICD-10-PCS | Mod: PBBFAC,,, | Performed by: OBSTETRICS & GYNECOLOGY

## 2021-01-29 PROCEDURE — 90715 TDAP VACCINE 7 YRS/> IM: CPT | Mod: PBBFAC,59

## 2021-01-29 PROCEDURE — 99999 PR PBB SHADOW E&M-EST. PATIENT-LVL III: CPT | Mod: PBBFAC,,, | Performed by: OBSTETRICS & GYNECOLOGY

## 2021-01-29 PROCEDURE — 96372 THER/PROPH/DIAG INJ SC/IM: CPT

## 2021-01-29 PROCEDURE — 99999 PR PBB SHADOW E&M-EST. PATIENT-LVL III: ICD-10-PCS | Mod: PBBFAC,,,

## 2021-01-29 PROCEDURE — 90715 TDAP VACCINE 7 YRS/> IM: CPT | Mod: PBBFAC

## 2021-01-29 PROCEDURE — 99214 PR OFFICE/OUTPT VISIT, EST, LEVL IV, 30-39 MIN: ICD-10-PCS | Mod: TH,S$PBB,, | Performed by: OBSTETRICS & GYNECOLOGY

## 2021-01-29 PROCEDURE — 99213 OFFICE O/P EST LOW 20 MIN: CPT | Mod: PBBFAC,TH,25 | Performed by: OBSTETRICS & GYNECOLOGY

## 2021-01-29 PROCEDURE — 63600519 RHOGAM PHARM REV CODE 636 ALT 250 W HCPCS: Performed by: OBSTETRICS & GYNECOLOGY

## 2021-01-29 PROCEDURE — 99214 OFFICE O/P EST MOD 30 MIN: CPT | Mod: TH,S$PBB,, | Performed by: OBSTETRICS & GYNECOLOGY

## 2021-01-29 PROCEDURE — 99999 PR PBB SHADOW E&M-EST. PATIENT-LVL III: CPT | Mod: PBBFAC,,,

## 2021-01-29 PROCEDURE — 90472 IMMUNIZATION ADMIN EACH ADD: CPT | Mod: PBBFAC

## 2021-01-29 PROCEDURE — 90471 IMMUNIZATION ADMIN: CPT | Mod: PBBFAC

## 2021-01-29 RX ORDER — ACYCLOVIR 400 MG/1
400 TABLET ORAL 2 TIMES DAILY
Qty: 180 TABLET | Refills: 3 | Status: ON HOLD | OUTPATIENT
Start: 2021-01-29 | End: 2021-02-21 | Stop reason: HOSPADM

## 2021-01-29 RX ADMIN — HUMAN RHO(D) IMMUNE GLOBULIN 300 MCG: 300 INJECTION, SOLUTION INTRAMUSCULAR at 12:01

## 2021-01-29 RX ADMIN — CLOSTRIDIUM TETANI TOXOID ANTIGEN (FORMALDEHYDE INACTIVATED), CORYNEBACTERIUM DIPHTHERIAE TOXOID ANTIGEN (FORMALDEHYDE INACTIVATED), BORDETELLA PERTUSSIS TOXOID ANTIGEN (GLUTARALDEHYDE INACTIVATED), BORDETELLA PERTUSSIS FILAMENTOUS HEMAGGLUTININ ANTIGEN (FORMALDEHYDE INACTIVATED), BORDETELLA PERTUSSIS PERTACTIN ANTIGEN, AND BORDETELLA PERTUSSIS FIMBRIAE 2/3 ANTIGEN 0.5 ML: 5; 2; 2.5; 5; 3; 5 INJECTION, SUSPENSION INTRAMUSCULAR at 10:01

## 2021-02-04 ENCOUNTER — ROUTINE PRENATAL (OUTPATIENT)
Dept: OBSTETRICS AND GYNECOLOGY | Facility: CLINIC | Age: 26
End: 2021-02-04
Payer: MEDICAID

## 2021-02-04 VITALS
DIASTOLIC BLOOD PRESSURE: 78 MMHG | WEIGHT: 163.81 LBS | SYSTOLIC BLOOD PRESSURE: 124 MMHG | BODY MASS INDEX: 26.44 KG/M2

## 2021-02-04 DIAGNOSIS — O99.820 GBS (GROUP B STREPTOCOCCUS CARRIER), +RV CULTURE, CURRENTLY PREGNANT: ICD-10-CM

## 2021-02-04 DIAGNOSIS — Z67.91 RH NEGATIVE STATE IN ANTEPARTUM PERIOD, THIRD TRIMESTER: Primary | ICD-10-CM

## 2021-02-04 DIAGNOSIS — Z3A.36 36 WEEKS GESTATION OF PREGNANCY: ICD-10-CM

## 2021-02-04 DIAGNOSIS — O26.893 RH NEGATIVE STATE IN ANTEPARTUM PERIOD, THIRD TRIMESTER: Primary | ICD-10-CM

## 2021-02-04 DIAGNOSIS — A60.09 GENITAL HERPES SIMPLEX VIRUS (HSV) INFECTION IN MOTHER AFFECTING PREGNANCY: ICD-10-CM

## 2021-02-04 DIAGNOSIS — O98.319 GENITAL HERPES SIMPLEX VIRUS (HSV) INFECTION IN MOTHER AFFECTING PREGNANCY: ICD-10-CM

## 2021-02-04 PROCEDURE — 99999 PR PBB SHADOW E&M-EST. PATIENT-LVL III: ICD-10-PCS | Mod: PBBFAC,,, | Performed by: OBSTETRICS & GYNECOLOGY

## 2021-02-04 PROCEDURE — 99213 PR OFFICE/OUTPT VISIT, EST, LEVL III, 20-29 MIN: ICD-10-PCS | Mod: TH,S$PBB,, | Performed by: OBSTETRICS & GYNECOLOGY

## 2021-02-04 PROCEDURE — 99999 PR PBB SHADOW E&M-EST. PATIENT-LVL III: CPT | Mod: PBBFAC,,, | Performed by: OBSTETRICS & GYNECOLOGY

## 2021-02-04 PROCEDURE — 87491 CHLMYD TRACH DNA AMP PROBE: CPT

## 2021-02-04 PROCEDURE — 99213 OFFICE O/P EST LOW 20 MIN: CPT | Mod: TH,S$PBB,, | Performed by: OBSTETRICS & GYNECOLOGY

## 2021-02-04 PROCEDURE — 87591 N.GONORRHOEAE DNA AMP PROB: CPT

## 2021-02-04 PROCEDURE — 99213 OFFICE O/P EST LOW 20 MIN: CPT | Mod: PBBFAC,TH | Performed by: OBSTETRICS & GYNECOLOGY

## 2021-02-06 LAB
C TRACH DNA SPEC QL NAA+PROBE: NOT DETECTED
N GONORRHOEA DNA SPEC QL NAA+PROBE: NOT DETECTED

## 2021-02-09 ENCOUNTER — PATIENT MESSAGE (OUTPATIENT)
Dept: OBSTETRICS AND GYNECOLOGY | Facility: CLINIC | Age: 26
End: 2021-02-09

## 2021-02-12 ENCOUNTER — ROUTINE PRENATAL (OUTPATIENT)
Dept: OBSTETRICS AND GYNECOLOGY | Facility: CLINIC | Age: 26
End: 2021-02-12
Payer: MEDICAID

## 2021-02-12 VITALS
WEIGHT: 165.38 LBS | DIASTOLIC BLOOD PRESSURE: 68 MMHG | BODY MASS INDEX: 26.69 KG/M2 | SYSTOLIC BLOOD PRESSURE: 122 MMHG

## 2021-02-12 DIAGNOSIS — O98.319 GENITAL HERPES SIMPLEX VIRUS (HSV) INFECTION IN MOTHER AFFECTING PREGNANCY: ICD-10-CM

## 2021-02-12 DIAGNOSIS — O99.820 GBS (GROUP B STREPTOCOCCUS CARRIER), +RV CULTURE, CURRENTLY PREGNANT: ICD-10-CM

## 2021-02-12 DIAGNOSIS — O26.893 RH NEGATIVE STATE IN ANTEPARTUM PERIOD, THIRD TRIMESTER: Primary | ICD-10-CM

## 2021-02-12 DIAGNOSIS — A60.09 GENITAL HERPES SIMPLEX VIRUS (HSV) INFECTION IN MOTHER AFFECTING PREGNANCY: ICD-10-CM

## 2021-02-12 DIAGNOSIS — Z3A.37 37 WEEKS GESTATION OF PREGNANCY: ICD-10-CM

## 2021-02-12 DIAGNOSIS — Z67.91 RH NEGATIVE STATE IN ANTEPARTUM PERIOD, THIRD TRIMESTER: Primary | ICD-10-CM

## 2021-02-12 PROCEDURE — 99999 PR PBB SHADOW E&M-EST. PATIENT-LVL III: CPT | Mod: PBBFAC,,, | Performed by: OBSTETRICS & GYNECOLOGY

## 2021-02-12 PROCEDURE — 99213 PR OFFICE/OUTPT VISIT, EST, LEVL III, 20-29 MIN: ICD-10-PCS | Mod: TH,S$PBB,, | Performed by: OBSTETRICS & GYNECOLOGY

## 2021-02-12 PROCEDURE — 99213 OFFICE O/P EST LOW 20 MIN: CPT | Mod: TH,S$PBB,, | Performed by: OBSTETRICS & GYNECOLOGY

## 2021-02-12 PROCEDURE — 99213 OFFICE O/P EST LOW 20 MIN: CPT | Mod: PBBFAC,TH | Performed by: OBSTETRICS & GYNECOLOGY

## 2021-02-12 PROCEDURE — 99999 PR PBB SHADOW E&M-EST. PATIENT-LVL III: ICD-10-PCS | Mod: PBBFAC,,, | Performed by: OBSTETRICS & GYNECOLOGY

## 2021-02-18 ENCOUNTER — HOSPITAL ENCOUNTER (INPATIENT)
Facility: HOSPITAL | Age: 26
LOS: 3 days | Discharge: HOME | End: 2021-02-21
Attending: OBSTETRICS & GYNECOLOGY | Admitting: OBSTETRICS & GYNECOLOGY
Payer: MEDICAID

## 2021-02-18 DIAGNOSIS — O36.8130 DECREASED FETAL MOVEMENTS IN THIRD TRIMESTER: ICD-10-CM

## 2021-02-18 DIAGNOSIS — O36.8130 DECREASED FETAL MOVEMENTS IN THIRD TRIMESTER, SINGLE OR UNSPECIFIED FETUS: ICD-10-CM

## 2021-02-18 LAB
ALBUMIN SERPL BCP-MCNC: 2.4 G/DL (ref 3.5–5.2)
ALP SERPL-CCNC: 326 U/L (ref 55–135)
ALT SERPL W/O P-5'-P-CCNC: 73 U/L (ref 10–44)
AMPHET+METHAMPHET UR QL: NEGATIVE
ANION GAP SERPL CALC-SCNC: 8 MMOL/L (ref 8–16)
AST SERPL-CCNC: 45 U/L (ref 10–40)
BACTERIA #/AREA URNS HPF: ABNORMAL /HPF
BARBITURATES UR QL SCN>200 NG/ML: NEGATIVE
BASOPHILS # BLD AUTO: 0.07 K/UL (ref 0–0.2)
BASOPHILS NFR BLD: 0.5 % (ref 0–1.9)
BENZODIAZ UR QL SCN>200 NG/ML: NEGATIVE
BILIRUB SERPL-MCNC: 0.4 MG/DL (ref 0.1–1)
BILIRUB UR QL STRIP: NEGATIVE
BLOOD GROUP ANTIBODIES SERPL: NORMAL
BUN SERPL-MCNC: 4 MG/DL (ref 6–20)
BZE UR QL SCN: NEGATIVE
CALCIUM SERPL-MCNC: 8.2 MG/DL (ref 8.7–10.5)
CANNABINOIDS UR QL SCN: NORMAL
CHLORIDE SERPL-SCNC: 107 MMOL/L (ref 95–110)
CLARITY UR: CLEAR
CO2 SERPL-SCNC: 21 MMOL/L (ref 23–29)
COLOR UR: YELLOW
CREAT SERPL-MCNC: 0.6 MG/DL (ref 0.5–1.4)
CREAT UR-MCNC: 47.5 MG/DL (ref 15–325)
DIFFERENTIAL METHOD: ABNORMAL
EOSINOPHIL # BLD AUTO: 0.1 K/UL (ref 0–0.5)
EOSINOPHIL NFR BLD: 1.1 % (ref 0–8)
ERYTHROCYTE [DISTWIDTH] IN BLOOD BY AUTOMATED COUNT: 13.4 % (ref 11.5–14.5)
EST. GFR  (AFRICAN AMERICAN): >60 ML/MIN/1.73 M^2
EST. GFR  (NON AFRICAN AMERICAN): >60 ML/MIN/1.73 M^2
GLUCOSE SERPL-MCNC: 90 MG/DL (ref 70–110)
GLUCOSE UR QL STRIP: NEGATIVE
HCT VFR BLD AUTO: 36 % (ref 37–48.5)
HGB BLD-MCNC: 12.2 G/DL (ref 12–16)
HGB UR QL STRIP: NEGATIVE
HIV1+2 IGG SERPL QL IA.RAPID: NORMAL
IMM GRANULOCYTES # BLD AUTO: 0.09 K/UL (ref 0–0.04)
IMM GRANULOCYTES NFR BLD AUTO: 0.7 % (ref 0–0.5)
KETONES UR QL STRIP: NEGATIVE
LEUKOCYTE ESTERASE UR QL STRIP: ABNORMAL
LYMPHOCYTES # BLD AUTO: 1.5 K/UL (ref 1–4.8)
LYMPHOCYTES NFR BLD: 11.6 % (ref 18–48)
MCH RBC QN AUTO: 31.6 PG (ref 27–31)
MCHC RBC AUTO-ENTMCNC: 33.9 G/DL (ref 32–36)
MCV RBC AUTO: 93 FL (ref 82–98)
METHADONE UR QL SCN>300 NG/ML: NEGATIVE
MICROSCOPIC COMMENT: ABNORMAL
MONOCYTES # BLD AUTO: 1 K/UL (ref 0.3–1)
MONOCYTES NFR BLD: 8 % (ref 4–15)
NEUTROPHILS # BLD AUTO: 10 K/UL (ref 1.8–7.7)
NEUTROPHILS NFR BLD: 78.1 % (ref 38–73)
NITRITE UR QL STRIP: NEGATIVE
NRBC BLD-RTO: 0 /100 WBC
OPIATES UR QL SCN: NEGATIVE
PCP UR QL SCN>25 NG/ML: NEGATIVE
PH UR STRIP: 6 [PH] (ref 5–8)
PLATELET # BLD AUTO: 244 K/UL (ref 150–350)
PMV BLD AUTO: 9.3 FL (ref 9.2–12.9)
POTASSIUM SERPL-SCNC: 4.4 MMOL/L (ref 3.5–5.1)
PROT SERPL-MCNC: 5.5 G/DL (ref 6–8.4)
PROT UR QL STRIP: NEGATIVE
RBC # BLD AUTO: 3.86 M/UL (ref 4–5.4)
RBC #/AREA URNS HPF: 2 /HPF (ref 0–4)
RUPTURE OF MEMBRANE: NEGATIVE
SARS-COV-2 RDRP RESP QL NAA+PROBE: NEGATIVE
SODIUM SERPL-SCNC: 136 MMOL/L (ref 136–145)
SP GR UR STRIP: 1.01 (ref 1–1.03)
SQUAMOUS #/AREA URNS HPF: 2 /HPF
TOXICOLOGY INFORMATION: NORMAL
URN SPEC COLLECT METH UR: ABNORMAL
UROBILINOGEN UR STRIP-ACNC: NEGATIVE EU/DL
WBC # BLD AUTO: 12.81 K/UL (ref 3.9–12.7)
WBC #/AREA URNS HPF: 3 /HPF (ref 0–5)
YEAST URNS QL MICRO: ABNORMAL

## 2021-02-18 PROCEDURE — 81000 URINALYSIS NONAUTO W/SCOPE: CPT | Mod: 59

## 2021-02-18 PROCEDURE — U0002 COVID-19 LAB TEST NON-CDC: HCPCS

## 2021-02-18 PROCEDURE — 80053 COMPREHEN METABOLIC PANEL: CPT

## 2021-02-18 PROCEDURE — 87340 HEPATITIS B SURFACE AG IA: CPT

## 2021-02-18 PROCEDURE — 86592 SYPHILIS TEST NON-TREP QUAL: CPT

## 2021-02-18 PROCEDURE — 59025 PR FETAL 2N-STRESS TEST: ICD-10-PCS | Mod: 26,,, | Performed by: OBSTETRICS & GYNECOLOGY

## 2021-02-18 PROCEDURE — 85025 COMPLETE CBC W/AUTO DIFF WBC: CPT

## 2021-02-18 PROCEDURE — 25000003 PHARM REV CODE 250: Performed by: OBSTETRICS & GYNECOLOGY

## 2021-02-18 PROCEDURE — 80307 DRUG TEST PRSMV CHEM ANLYZR: CPT

## 2021-02-18 PROCEDURE — 72100002 HC LABOR CARE, 1ST 8 HOURS

## 2021-02-18 PROCEDURE — 63600175 PHARM REV CODE 636 W HCPCS: Performed by: OBSTETRICS & GYNECOLOGY

## 2021-02-18 PROCEDURE — 11000001 HC ACUTE MED/SURG PRIVATE ROOM

## 2021-02-18 PROCEDURE — 86900 BLOOD TYPING SEROLOGIC ABO: CPT

## 2021-02-18 PROCEDURE — 86870 RBC ANTIBODY IDENTIFICATION: CPT

## 2021-02-18 PROCEDURE — 59025 FETAL NON-STRESS TEST: CPT | Mod: 26,,, | Performed by: OBSTETRICS & GYNECOLOGY

## 2021-02-18 PROCEDURE — 86703 HIV-1/HIV-2 1 RESULT ANTBDY: CPT | Mod: 91

## 2021-02-18 PROCEDURE — 84112 EVAL AMNIOTIC FLUID PROTEIN: CPT

## 2021-02-18 PROCEDURE — 36415 COLL VENOUS BLD VENIPUNCTURE: CPT

## 2021-02-18 PROCEDURE — 86703 HIV-1/HIV-2 1 RESULT ANTBDY: CPT

## 2021-02-18 RX ORDER — ACETAMINOPHEN 325 MG/1
650 TABLET ORAL EVERY 6 HOURS PRN
Status: DISCONTINUED | OUTPATIENT
Start: 2021-02-18 | End: 2021-02-19

## 2021-02-18 RX ORDER — FLUCONAZOLE 150 MG/1
150 TABLET ORAL ONCE
Status: COMPLETED | OUTPATIENT
Start: 2021-02-18 | End: 2021-02-18

## 2021-02-18 RX ORDER — SIMETHICONE 80 MG
1 TABLET,CHEWABLE ORAL 4 TIMES DAILY PRN
Status: DISCONTINUED | OUTPATIENT
Start: 2021-02-18 | End: 2021-02-19

## 2021-02-18 RX ORDER — METHYLERGONOVINE MALEATE 0.2 MG/ML
200 INJECTION INTRAVENOUS
Status: DISCONTINUED | OUTPATIENT
Start: 2021-02-18 | End: 2021-02-19

## 2021-02-18 RX ORDER — OXYTOCIN/RINGER'S LACTATE 30/500 ML
0-30 PLASTIC BAG, INJECTION (ML) INTRAVENOUS CONTINUOUS
Status: DISCONTINUED | OUTPATIENT
Start: 2021-02-18 | End: 2021-02-19

## 2021-02-18 RX ORDER — ONDANSETRON 8 MG/1
8 TABLET, ORALLY DISINTEGRATING ORAL EVERY 8 HOURS PRN
Status: DISCONTINUED | OUTPATIENT
Start: 2021-02-18 | End: 2021-02-19

## 2021-02-18 RX ORDER — MISOPROSTOL 100 MCG
25 TABLET ORAL EVERY 4 HOURS PRN
Status: DISCONTINUED | OUTPATIENT
Start: 2021-02-18 | End: 2021-02-19

## 2021-02-18 RX ORDER — OXYTOCIN/RINGER'S LACTATE 30/500 ML
95 PLASTIC BAG, INJECTION (ML) INTRAVENOUS ONCE
Status: DISCONTINUED | OUTPATIENT
Start: 2021-02-18 | End: 2021-02-19

## 2021-02-18 RX ORDER — SODIUM CHLORIDE, SODIUM LACTATE, POTASSIUM CHLORIDE, CALCIUM CHLORIDE 600; 310; 30; 20 MG/100ML; MG/100ML; MG/100ML; MG/100ML
INJECTION, SOLUTION INTRAVENOUS CONTINUOUS
Status: DISCONTINUED | OUTPATIENT
Start: 2021-02-18 | End: 2021-02-18

## 2021-02-18 RX ORDER — DIPHENHYDRAMINE HYDROCHLORIDE 50 MG/ML
25 INJECTION INTRAMUSCULAR; INTRAVENOUS EVERY 6 HOURS PRN
Status: DISCONTINUED | OUTPATIENT
Start: 2021-02-18 | End: 2021-02-19

## 2021-02-18 RX ORDER — CALCIUM CARBONATE 200(500)MG
500 TABLET,CHEWABLE ORAL 3 TIMES DAILY PRN
Status: DISCONTINUED | OUTPATIENT
Start: 2021-02-18 | End: 2021-02-19

## 2021-02-18 RX ORDER — MISOPROSTOL 200 UG/1
800 TABLET ORAL
Status: DISCONTINUED | OUTPATIENT
Start: 2021-02-18 | End: 2021-02-19

## 2021-02-18 RX ORDER — SODIUM CHLORIDE 9 MG/ML
INJECTION, SOLUTION INTRAVENOUS
Status: DISCONTINUED | OUTPATIENT
Start: 2021-02-18 | End: 2021-02-19

## 2021-02-18 RX ORDER — BUTORPHANOL TARTRATE 1 MG/ML
2 INJECTION INTRAMUSCULAR; INTRAVENOUS
Status: DISCONTINUED | OUTPATIENT
Start: 2021-02-18 | End: 2021-02-19

## 2021-02-18 RX ORDER — CARBOPROST TROMETHAMINE 250 UG/ML
250 INJECTION, SOLUTION INTRAMUSCULAR
Status: DISCONTINUED | OUTPATIENT
Start: 2021-02-18 | End: 2021-02-19

## 2021-02-18 RX ORDER — SODIUM CHLORIDE 9 MG/ML
INJECTION, SOLUTION INTRAVENOUS CONTINUOUS
Status: DISCONTINUED | OUTPATIENT
Start: 2021-02-18 | End: 2021-02-19

## 2021-02-18 RX ORDER — SODIUM CHLORIDE, SODIUM LACTATE, POTASSIUM CHLORIDE, CALCIUM CHLORIDE 600; 310; 30; 20 MG/100ML; MG/100ML; MG/100ML; MG/100ML
INJECTION, SOLUTION INTRAVENOUS CONTINUOUS
Status: DISCONTINUED | OUTPATIENT
Start: 2021-02-18 | End: 2021-02-19

## 2021-02-18 RX ORDER — SODIUM CHLORIDE 0.9 % (FLUSH) 0.9 %
10 SYRINGE (ML) INJECTION
Status: DISCONTINUED | OUTPATIENT
Start: 2021-02-18 | End: 2021-02-19

## 2021-02-18 RX ORDER — OXYTOCIN/RINGER'S LACTATE 30/500 ML
334 PLASTIC BAG, INJECTION (ML) INTRAVENOUS ONCE
Status: DISCONTINUED | OUTPATIENT
Start: 2021-02-18 | End: 2021-02-19

## 2021-02-18 RX ORDER — BUTORPHANOL TARTRATE 1 MG/ML
1 INJECTION INTRAMUSCULAR; INTRAVENOUS
Status: DISCONTINUED | OUTPATIENT
Start: 2021-02-18 | End: 2021-02-19

## 2021-02-18 RX ADMIN — SODIUM CHLORIDE, SODIUM LACTATE, POTASSIUM CHLORIDE, AND CALCIUM CHLORIDE: .6; .31; .03; .02 INJECTION, SOLUTION INTRAVENOUS at 09:02

## 2021-02-18 RX ADMIN — PROMETHAZINE HYDROCHLORIDE 12.5 MG: 25 INJECTION INTRAMUSCULAR; INTRAVENOUS at 11:02

## 2021-02-18 RX ADMIN — FLUCONAZOLE 150 MG: 150 TABLET ORAL at 04:02

## 2021-02-18 RX ADMIN — Medication 25 MCG: at 07:02

## 2021-02-18 RX ADMIN — BUTORPHANOL TARTRATE 1 MG: 1 INJECTION, SOLUTION INTRAMUSCULAR; INTRAVENOUS at 11:02

## 2021-02-18 RX ADMIN — SODIUM CHLORIDE, SODIUM LACTATE, POTASSIUM CHLORIDE, AND CALCIUM CHLORIDE: .6; .31; .03; .02 INJECTION, SOLUTION INTRAVENOUS at 03:02

## 2021-02-18 RX ADMIN — Medication 25 MCG: at 03:02

## 2021-02-18 RX ADMIN — SODIUM CHLORIDE, SODIUM LACTATE, POTASSIUM CHLORIDE, AND CALCIUM CHLORIDE 1000 ML: .6; .31; .03; .02 INJECTION, SOLUTION INTRAVENOUS at 02:02

## 2021-02-19 ENCOUNTER — ANESTHESIA (OUTPATIENT)
Dept: OBSTETRICS AND GYNECOLOGY | Facility: HOSPITAL | Age: 26
End: 2021-02-19
Payer: MEDICAID

## 2021-02-19 ENCOUNTER — ANESTHESIA EVENT (OUTPATIENT)
Dept: OBSTETRICS AND GYNECOLOGY | Facility: HOSPITAL | Age: 26
End: 2021-02-19
Payer: MEDICAID

## 2021-02-19 PROBLEM — O36.8130 DECREASED FETAL MOVEMENTS IN THIRD TRIMESTER: Status: RESOLVED | Noted: 2021-02-18 | Resolved: 2021-02-19

## 2021-02-19 PROBLEM — O36.8130 DECREASED FETAL MOVEMENTS IN THIRD TRIMESTER: Status: ACTIVE | Noted: 2021-02-19

## 2021-02-19 LAB
ABO + RH BLD: NORMAL
BLD GP AB SCN CELLS X3 SERPL QL: NORMAL
HBV SURFACE AG SERPL QL IA: NEGATIVE
HIV 1+2 AB+HIV1 P24 AG SERPL QL IA: NEGATIVE
RPR SER QL: NORMAL

## 2021-02-19 PROCEDURE — 63600175 PHARM REV CODE 636 W HCPCS

## 2021-02-19 PROCEDURE — 25000003 PHARM REV CODE 250: Performed by: ANESTHESIOLOGY

## 2021-02-19 PROCEDURE — 25000003 PHARM REV CODE 250: Performed by: OBSTETRICS & GYNECOLOGY

## 2021-02-19 PROCEDURE — 59409 OBSTETRICAL CARE: CPT | Mod: AT,,, | Performed by: OBSTETRICS & GYNECOLOGY

## 2021-02-19 PROCEDURE — 59409 OBSTETRICAL CARE: CPT | Mod: AA,,, | Performed by: ANESTHESIOLOGY

## 2021-02-19 PROCEDURE — 62326 NJX INTERLAMINAR LMBR/SAC: CPT | Performed by: ANESTHESIOLOGY

## 2021-02-19 PROCEDURE — 59409 PRA ETRICAL CARE,VAG DELIV ONLY: ICD-10-PCS | Mod: AA,,, | Performed by: ANESTHESIOLOGY

## 2021-02-19 PROCEDURE — 63600175 PHARM REV CODE 636 W HCPCS: Performed by: OBSTETRICS & GYNECOLOGY

## 2021-02-19 PROCEDURE — 72200005 HC VAGINAL DELIVERY LEVEL II

## 2021-02-19 PROCEDURE — 51702 INSERT TEMP BLADDER CATH: CPT

## 2021-02-19 PROCEDURE — 27800516 HC TRAY, EPIDURAL COMBO: Performed by: ANESTHESIOLOGY

## 2021-02-19 PROCEDURE — 27200710 HC EPIDURAL INFUSION PUMP SET: Performed by: ANESTHESIOLOGY

## 2021-02-19 PROCEDURE — 11000001 HC ACUTE MED/SURG PRIVATE ROOM

## 2021-02-19 PROCEDURE — 72100002 HC LABOR CARE, 1ST 8 HOURS

## 2021-02-19 PROCEDURE — 59409 PR OBSTETRICAL CARE,VAG DELIV ONLY: ICD-10-PCS | Mod: AT,,, | Performed by: OBSTETRICS & GYNECOLOGY

## 2021-02-19 PROCEDURE — 72100003 HC LABOR CARE, EA. ADDL. 8 HRS

## 2021-02-19 RX ORDER — BUPIVACAINE HYDROCHLORIDE 2.5 MG/ML
INJECTION, SOLUTION EPIDURAL; INFILTRATION; INTRACAUDAL
Status: DISCONTINUED | OUTPATIENT
Start: 2021-02-19 | End: 2021-02-19

## 2021-02-19 RX ORDER — ACETAMINOPHEN 325 MG/1
650 TABLET ORAL EVERY 6 HOURS PRN
Status: DISCONTINUED | OUTPATIENT
Start: 2021-02-19 | End: 2021-02-21 | Stop reason: HOSPADM

## 2021-02-19 RX ORDER — IBUPROFEN 600 MG/1
600 TABLET ORAL EVERY 6 HOURS
Status: DISCONTINUED | OUTPATIENT
Start: 2021-02-19 | End: 2021-02-21 | Stop reason: HOSPADM

## 2021-02-19 RX ORDER — FENTANYL/BUPIVACAINE/NS/PF 2MCG/ML-.1
PLASTIC BAG, INJECTION (ML) INJECTION CONTINUOUS PRN
Status: DISCONTINUED | OUTPATIENT
Start: 2021-02-19 | End: 2021-02-19

## 2021-02-19 RX ORDER — LIDOCAINE HYDROCHLORIDE AND EPINEPHRINE 15; 5 MG/ML; UG/ML
INJECTION, SOLUTION EPIDURAL
Status: DISCONTINUED | OUTPATIENT
Start: 2021-02-19 | End: 2021-02-19

## 2021-02-19 RX ORDER — ONDANSETRON 8 MG/1
8 TABLET, ORALLY DISINTEGRATING ORAL EVERY 8 HOURS PRN
Status: DISCONTINUED | OUTPATIENT
Start: 2021-02-19 | End: 2021-02-21 | Stop reason: HOSPADM

## 2021-02-19 RX ORDER — SIMETHICONE 80 MG
1 TABLET,CHEWABLE ORAL EVERY 6 HOURS PRN
Status: DISCONTINUED | OUTPATIENT
Start: 2021-02-19 | End: 2021-02-21 | Stop reason: HOSPADM

## 2021-02-19 RX ORDER — DIPHENHYDRAMINE HYDROCHLORIDE 50 MG/ML
25 INJECTION INTRAMUSCULAR; INTRAVENOUS EVERY 4 HOURS PRN
Status: DISCONTINUED | OUTPATIENT
Start: 2021-02-19 | End: 2021-02-21 | Stop reason: HOSPADM

## 2021-02-19 RX ORDER — DIPHENHYDRAMINE HCL 25 MG
25 CAPSULE ORAL EVERY 4 HOURS PRN
Status: DISCONTINUED | OUTPATIENT
Start: 2021-02-19 | End: 2021-02-21 | Stop reason: HOSPADM

## 2021-02-19 RX ORDER — DOCUSATE SODIUM 100 MG/1
200 CAPSULE, LIQUID FILLED ORAL 2 TIMES DAILY PRN
Status: DISCONTINUED | OUTPATIENT
Start: 2021-02-19 | End: 2021-02-21 | Stop reason: HOSPADM

## 2021-02-19 RX ORDER — OXYTOCIN/RINGER'S LACTATE 30/500 ML
95 PLASTIC BAG, INJECTION (ML) INTRAVENOUS ONCE
Status: DISCONTINUED | OUTPATIENT
Start: 2021-02-19 | End: 2021-02-21 | Stop reason: HOSPADM

## 2021-02-19 RX ORDER — PRENATAL WITH FERROUS FUM AND FOLIC ACID 3080; 920; 120; 400; 22; 1.84; 3; 20; 10; 1; 12; 200; 27; 25; 2 [IU]/1; [IU]/1; MG/1; [IU]/1; MG/1; MG/1; MG/1; MG/1; MG/1; MG/1; UG/1; MG/1; MG/1; MG/1; MG/1
1 TABLET ORAL DAILY
Status: DISCONTINUED | OUTPATIENT
Start: 2021-02-19 | End: 2021-02-21 | Stop reason: HOSPADM

## 2021-02-19 RX ORDER — ACETAMINOPHEN 500 MG
1000 TABLET ORAL EVERY 6 HOURS PRN
Status: DISCONTINUED | OUTPATIENT
Start: 2021-02-19 | End: 2021-02-21 | Stop reason: HOSPADM

## 2021-02-19 RX ORDER — TERBUTALINE SULFATE 1 MG/ML
0.25 INJECTION SUBCUTANEOUS ONCE
Status: COMPLETED | OUTPATIENT
Start: 2021-02-19 | End: 2021-02-19

## 2021-02-19 RX ORDER — TERBUTALINE SULFATE 1 MG/ML
INJECTION SUBCUTANEOUS
Status: COMPLETED
Start: 2021-02-19 | End: 2021-02-19

## 2021-02-19 RX ORDER — HYDROCORTISONE 25 MG/G
CREAM TOPICAL 3 TIMES DAILY PRN
Status: DISCONTINUED | OUTPATIENT
Start: 2021-02-19 | End: 2021-02-21 | Stop reason: HOSPADM

## 2021-02-19 RX ADMIN — SODIUM CHLORIDE, SODIUM LACTATE, POTASSIUM CHLORIDE, AND CALCIUM CHLORIDE: .6; .31; .03; .02 INJECTION, SOLUTION INTRAVENOUS at 01:02

## 2021-02-19 RX ADMIN — BUPIVACAINE HYDROCHLORIDE 4 ML: 2.5 INJECTION, SOLUTION EPIDURAL; INFILTRATION; INTRACAUDAL; PERINEURAL at 03:02

## 2021-02-19 RX ADMIN — TERBUTALINE SULFATE 0.25 MG: 1 INJECTION SUBCUTANEOUS at 04:02

## 2021-02-19 RX ADMIN — DEXTROSE 3 MILLION UNITS: 50 INJECTION, SOLUTION INTRAVENOUS at 08:02

## 2021-02-19 RX ADMIN — LIDOCAINE HYDROCHLORIDE,EPINEPHRINE BITARTRATE 3 ML: 15; .005 INJECTION, SOLUTION EPIDURAL; INFILTRATION; INTRACAUDAL; PERINEURAL at 03:02

## 2021-02-19 RX ADMIN — ACETAMINOPHEN 1000 MG: 500 TABLET ORAL at 09:02

## 2021-02-19 RX ADMIN — TERBUTALINE SULFATE 0.25 MG: 1 INJECTION, SOLUTION SUBCUTANEOUS at 04:02

## 2021-02-19 RX ADMIN — Medication 1 MILLI-UNITS/MIN: at 02:02

## 2021-02-19 RX ADMIN — Medication 10 ML/HR: at 03:02

## 2021-02-19 RX ADMIN — DEXTROSE 3 MILLION UNITS: 50 INJECTION, SOLUTION INTRAVENOUS at 04:02

## 2021-02-19 RX ADMIN — IBUPROFEN 600 MG: 600 TABLET ORAL at 05:02

## 2021-02-19 RX ADMIN — IBUPROFEN 600 MG: 600 TABLET ORAL at 11:02

## 2021-02-19 RX ADMIN — SODIUM CHLORIDE, SODIUM LACTATE, POTASSIUM CHLORIDE, AND CALCIUM CHLORIDE: .6; .31; .03; .02 INJECTION, SOLUTION INTRAVENOUS at 08:02

## 2021-02-19 RX ADMIN — PENICILLIN G POTASSIUM 5 MILLION UNITS: 5000000 INJECTION, POWDER, FOR SOLUTION INTRAMUSCULAR; INTRAVENOUS at 12:02

## 2021-02-19 RX ADMIN — SODIUM CHLORIDE, SODIUM LACTATE, POTASSIUM CHLORIDE, AND CALCIUM CHLORIDE: .6; .31; .03; .02 INJECTION, SOLUTION INTRAVENOUS at 03:02

## 2021-02-20 LAB
ABO + RH BLD: NORMAL
BASOPHILS # BLD AUTO: 0.05 K/UL (ref 0–0.2)
BASOPHILS NFR BLD: 0.3 % (ref 0–1.9)
BLD GP AB SCN CELLS X3 SERPL QL: NORMAL
DIFFERENTIAL METHOD: ABNORMAL
EOSINOPHIL # BLD AUTO: 0.3 K/UL (ref 0–0.5)
EOSINOPHIL NFR BLD: 1.6 % (ref 0–8)
ERYTHROCYTE [DISTWIDTH] IN BLOOD BY AUTOMATED COUNT: 13.2 % (ref 11.5–14.5)
FETAL CELL SCN BLD QL ROSETTE: NORMAL
HCT VFR BLD AUTO: 30.2 % (ref 37–48.5)
HGB BLD-MCNC: 10.1 G/DL (ref 12–16)
IMM GRANULOCYTES # BLD AUTO: 0.1 K/UL (ref 0–0.04)
IMM GRANULOCYTES NFR BLD AUTO: 0.6 % (ref 0–0.5)
INJECT RH IG VOL PATIENT: NORMAL ML
LYMPHOCYTES # BLD AUTO: 2.4 K/UL (ref 1–4.8)
LYMPHOCYTES NFR BLD: 14.6 % (ref 18–48)
MCH RBC QN AUTO: 31.3 PG (ref 27–31)
MCHC RBC AUTO-ENTMCNC: 33.4 G/DL (ref 32–36)
MCV RBC AUTO: 94 FL (ref 82–98)
MONOCYTES # BLD AUTO: 1.2 K/UL (ref 0.3–1)
MONOCYTES NFR BLD: 7.6 % (ref 4–15)
NEUTROPHILS # BLD AUTO: 12.2 K/UL (ref 1.8–7.7)
NEUTROPHILS NFR BLD: 75.3 % (ref 38–73)
NRBC BLD-RTO: 0 /100 WBC
PLATELET # BLD AUTO: 206 K/UL (ref 150–350)
PMV BLD AUTO: 9.9 FL (ref 9.2–12.9)
RBC # BLD AUTO: 3.23 M/UL (ref 4–5.4)
WBC # BLD AUTO: 16.27 K/UL (ref 3.9–12.7)

## 2021-02-20 PROCEDURE — 25000003 PHARM REV CODE 250: Performed by: OBSTETRICS & GYNECOLOGY

## 2021-02-20 PROCEDURE — 11000001 HC ACUTE MED/SURG PRIVATE ROOM

## 2021-02-20 PROCEDURE — 63600519 RHOGAM PHARM REV CODE 636 ALT 250 W HCPCS: Performed by: OBSTETRICS & GYNECOLOGY

## 2021-02-20 PROCEDURE — 99231 PR SUBSEQUENT HOSPITAL CARE,LEVL I: ICD-10-PCS | Mod: ,,, | Performed by: OBSTETRICS & GYNECOLOGY

## 2021-02-20 PROCEDURE — 99231 SBSQ HOSP IP/OBS SF/LOW 25: CPT | Mod: ,,, | Performed by: OBSTETRICS & GYNECOLOGY

## 2021-02-20 PROCEDURE — 36415 COLL VENOUS BLD VENIPUNCTURE: CPT

## 2021-02-20 PROCEDURE — 86900 BLOOD TYPING SEROLOGIC ABO: CPT

## 2021-02-20 PROCEDURE — 85025 COMPLETE CBC W/AUTO DIFF WBC: CPT

## 2021-02-20 PROCEDURE — 85461 HEMOGLOBIN FETAL: CPT

## 2021-02-20 RX ORDER — OXYCODONE HYDROCHLORIDE 5 MG/1
5 TABLET ORAL EVERY 4 HOURS PRN
Status: DISCONTINUED | OUTPATIENT
Start: 2021-02-20 | End: 2021-02-21 | Stop reason: HOSPADM

## 2021-02-20 RX ADMIN — HUMAN RHO(D) IMMUNE GLOBULIN 300 MCG: 300 INJECTION, SOLUTION INTRAMUSCULAR at 01:02

## 2021-02-20 RX ADMIN — PRENATAL VIT W/ FE FUMARATE-FA TAB 27-0.8 MG 1 TABLET: 27-0.8 TAB at 09:02

## 2021-02-20 RX ADMIN — IBUPROFEN 600 MG: 600 TABLET ORAL at 11:02

## 2021-02-20 RX ADMIN — IBUPROFEN 600 MG: 600 TABLET ORAL at 06:02

## 2021-02-20 RX ADMIN — OXYCODONE 5 MG: 5 TABLET ORAL at 07:02

## 2021-02-20 RX ADMIN — DOCUSATE SODIUM 200 MG: 100 CAPSULE, LIQUID FILLED ORAL at 09:02

## 2021-02-20 RX ADMIN — OXYCODONE 5 MG: 5 TABLET ORAL at 01:02

## 2021-02-20 RX ADMIN — IBUPROFEN 600 MG: 600 TABLET ORAL at 05:02

## 2021-02-20 RX ADMIN — ACETAMINOPHEN 650 MG: 325 TABLET ORAL at 01:02

## 2021-02-21 VITALS
OXYGEN SATURATION: 98 % | BODY MASS INDEX: 26.58 KG/M2 | SYSTOLIC BLOOD PRESSURE: 141 MMHG | RESPIRATION RATE: 18 BRPM | WEIGHT: 165.38 LBS | HEIGHT: 66 IN | TEMPERATURE: 97 F | HEART RATE: 89 BPM | DIASTOLIC BLOOD PRESSURE: 77 MMHG

## 2021-02-21 PROBLEM — O36.8130 DECREASED FETAL MOVEMENTS IN THIRD TRIMESTER: Status: RESOLVED | Noted: 2021-02-19 | Resolved: 2021-02-21

## 2021-02-21 PROCEDURE — 25000003 PHARM REV CODE 250: Performed by: OBSTETRICS & GYNECOLOGY

## 2021-02-21 PROCEDURE — 99238 HOSP IP/OBS DSCHRG MGMT 30/<: CPT | Mod: ,,, | Performed by: OBSTETRICS & GYNECOLOGY

## 2021-02-21 PROCEDURE — 99238 PR HOSPITAL DISCHARGE DAY,<30 MIN: ICD-10-PCS | Mod: ,,, | Performed by: OBSTETRICS & GYNECOLOGY

## 2021-02-21 RX ORDER — IBUPROFEN 800 MG/1
800 TABLET ORAL EVERY 8 HOURS PRN
Qty: 60 TABLET | Refills: 2 | Status: SHIPPED | OUTPATIENT
Start: 2021-02-21 | End: 2022-02-21

## 2021-02-21 RX ORDER — ACETAMINOPHEN 500 MG
1000 TABLET ORAL EVERY 6 HOURS PRN
Qty: 100 TABLET | Refills: 2 | Status: SHIPPED | OUTPATIENT
Start: 2021-02-21 | End: 2022-02-21

## 2021-02-21 RX ORDER — DOCUSATE SODIUM 100 MG/1
100 CAPSULE, LIQUID FILLED ORAL 2 TIMES DAILY
Qty: 60 CAPSULE | Refills: 1 | Status: SHIPPED | OUTPATIENT
Start: 2021-02-21 | End: 2022-02-21

## 2021-02-21 RX ADMIN — IBUPROFEN 600 MG: 600 TABLET ORAL at 12:02

## 2021-02-21 RX ADMIN — OXYCODONE 5 MG: 5 TABLET ORAL at 09:02

## 2021-02-21 RX ADMIN — PRENATAL VIT W/ FE FUMARATE-FA TAB 27-0.8 MG 1 TABLET: 27-0.8 TAB at 09:02

## 2021-02-21 RX ADMIN — IBUPROFEN 600 MG: 600 TABLET ORAL at 05:02

## 2021-02-23 ENCOUNTER — TELEPHONE (OUTPATIENT)
Dept: OBSTETRICS AND GYNECOLOGY | Facility: HOSPITAL | Age: 26
End: 2021-02-23

## 2021-02-23 ENCOUNTER — HOSPITAL ENCOUNTER (EMERGENCY)
Facility: HOSPITAL | Age: 26
Discharge: HOME | End: 2021-02-23
Attending: EMERGENCY MEDICINE
Payer: MEDICAID

## 2021-02-23 VITALS
SYSTOLIC BLOOD PRESSURE: 155 MMHG | RESPIRATION RATE: 20 BRPM | BODY MASS INDEX: 22.5 KG/M2 | WEIGHT: 140 LBS | OXYGEN SATURATION: 100 % | HEART RATE: 67 BPM | DIASTOLIC BLOOD PRESSURE: 85 MMHG | HEIGHT: 66 IN | TEMPERATURE: 98 F

## 2021-02-23 DIAGNOSIS — R52 POSTPARTUM PAIN: Primary | ICD-10-CM

## 2021-02-23 DIAGNOSIS — N39.0 ACUTE UTI: ICD-10-CM

## 2021-02-23 LAB
ALBUMIN SERPL BCP-MCNC: 2.5 G/DL (ref 3.5–5.2)
ALP SERPL-CCNC: 170 U/L (ref 55–135)
ALT SERPL W/O P-5'-P-CCNC: 27 U/L (ref 10–44)
ANION GAP SERPL CALC-SCNC: 7 MMOL/L (ref 8–16)
AST SERPL-CCNC: 23 U/L (ref 10–40)
B-HCG UR QL: NEGATIVE
BACTERIA #/AREA URNS HPF: ABNORMAL /HPF
BASOPHILS # BLD AUTO: 0.05 K/UL (ref 0–0.2)
BASOPHILS NFR BLD: 0.4 % (ref 0–1.9)
BILIRUB SERPL-MCNC: 0.3 MG/DL (ref 0.1–1)
BILIRUB UR QL STRIP: NEGATIVE
BUN SERPL-MCNC: 8 MG/DL (ref 6–20)
CALCIUM SERPL-MCNC: 8.2 MG/DL (ref 8.7–10.5)
CHLORIDE SERPL-SCNC: 107 MMOL/L (ref 95–110)
CLARITY UR: CLEAR
CO2 SERPL-SCNC: 25 MMOL/L (ref 23–29)
COLOR UR: COLORLESS
CREAT SERPL-MCNC: 0.6 MG/DL (ref 0.5–1.4)
CTP QC/QA: YES
DIFFERENTIAL METHOD: ABNORMAL
EOSINOPHIL # BLD AUTO: 0.3 K/UL (ref 0–0.5)
EOSINOPHIL NFR BLD: 2.3 % (ref 0–8)
ERYTHROCYTE [DISTWIDTH] IN BLOOD BY AUTOMATED COUNT: 13.2 % (ref 11.5–14.5)
EST. GFR  (AFRICAN AMERICAN): >60 ML/MIN/1.73 M^2
EST. GFR  (NON AFRICAN AMERICAN): >60 ML/MIN/1.73 M^2
GLUCOSE SERPL-MCNC: 79 MG/DL (ref 70–110)
GLUCOSE UR QL STRIP: NEGATIVE
HCT VFR BLD AUTO: 33.7 % (ref 37–48.5)
HGB BLD-MCNC: 11.3 G/DL (ref 12–16)
HGB UR QL STRIP: ABNORMAL
IMM GRANULOCYTES # BLD AUTO: 0.21 K/UL (ref 0–0.04)
IMM GRANULOCYTES NFR BLD AUTO: 1.8 % (ref 0–0.5)
KETONES UR QL STRIP: NEGATIVE
LEUKOCYTE ESTERASE UR QL STRIP: ABNORMAL
LYMPHOCYTES # BLD AUTO: 1.4 K/UL (ref 1–4.8)
LYMPHOCYTES NFR BLD: 11.8 % (ref 18–48)
MCH RBC QN AUTO: 31.1 PG (ref 27–31)
MCHC RBC AUTO-ENTMCNC: 33.5 G/DL (ref 32–36)
MCV RBC AUTO: 93 FL (ref 82–98)
MICROSCOPIC COMMENT: ABNORMAL
MONOCYTES # BLD AUTO: 0.8 K/UL (ref 0.3–1)
MONOCYTES NFR BLD: 6.5 % (ref 4–15)
NEUTROPHILS # BLD AUTO: 9.3 K/UL (ref 1.8–7.7)
NEUTROPHILS NFR BLD: 77.2 % (ref 38–73)
NITRITE UR QL STRIP: NEGATIVE
NRBC BLD-RTO: 0 /100 WBC
PH UR STRIP: 7 [PH] (ref 5–8)
PLATELET # BLD AUTO: 367 K/UL (ref 150–350)
PMV BLD AUTO: 9 FL (ref 9.2–12.9)
POTASSIUM SERPL-SCNC: 4.1 MMOL/L (ref 3.5–5.1)
PROT SERPL-MCNC: 6.1 G/DL (ref 6–8.4)
PROT UR QL STRIP: NEGATIVE
RBC # BLD AUTO: 3.63 M/UL (ref 4–5.4)
RBC #/AREA URNS HPF: 77 /HPF (ref 0–4)
SODIUM SERPL-SCNC: 139 MMOL/L (ref 136–145)
SP GR UR STRIP: 1 (ref 1–1.03)
URN SPEC COLLECT METH UR: ABNORMAL
UROBILINOGEN UR STRIP-ACNC: NEGATIVE EU/DL
WBC # BLD AUTO: 12 K/UL (ref 3.9–12.7)
WBC #/AREA URNS HPF: 14 /HPF (ref 0–5)

## 2021-02-23 PROCEDURE — 85025 COMPLETE CBC W/AUTO DIFF WBC: CPT

## 2021-02-23 PROCEDURE — 87086 URINE CULTURE/COLONY COUNT: CPT

## 2021-02-23 PROCEDURE — 25000003 PHARM REV CODE 250: Performed by: PHYSICIAN ASSISTANT

## 2021-02-23 PROCEDURE — 81025 URINE PREGNANCY TEST: CPT | Performed by: EMERGENCY MEDICINE

## 2021-02-23 PROCEDURE — 99284 EMERGENCY DEPT VISIT MOD MDM: CPT

## 2021-02-23 PROCEDURE — 81000 URINALYSIS NONAUTO W/SCOPE: CPT

## 2021-02-23 PROCEDURE — 80053 COMPREHEN METABOLIC PANEL: CPT

## 2021-02-23 RX ORDER — ACETAMINOPHEN 500 MG
500 TABLET ORAL
Status: DISCONTINUED | OUTPATIENT
Start: 2021-02-23 | End: 2021-02-23

## 2021-02-23 RX ORDER — HYDROCODONE BITARTRATE AND ACETAMINOPHEN 5; 325 MG/1; MG/1
1 TABLET ORAL EVERY 6 HOURS PRN
Qty: 6 TABLET | Refills: 0 | Status: SHIPPED | OUTPATIENT
Start: 2021-02-23 | End: 2021-02-26

## 2021-02-23 RX ORDER — CEPHALEXIN 500 MG/1
500 CAPSULE ORAL EVERY 12 HOURS
Qty: 20 CAPSULE | Refills: 0 | Status: SHIPPED | OUTPATIENT
Start: 2021-02-23 | End: 2021-03-05

## 2021-02-23 RX ORDER — ONDANSETRON 8 MG/1
8 TABLET, ORALLY DISINTEGRATING ORAL
Status: DISCONTINUED | OUTPATIENT
Start: 2021-02-23 | End: 2021-02-23

## 2021-02-23 RX ORDER — ONDANSETRON 4 MG/1
8 TABLET, FILM COATED ORAL EVERY 12 HOURS PRN
Qty: 12 TABLET | Refills: 0 | Status: SHIPPED | OUTPATIENT
Start: 2021-02-23 | End: 2022-09-09

## 2021-02-23 RX ORDER — CEPHALEXIN 500 MG/1
500 CAPSULE ORAL
Status: COMPLETED | OUTPATIENT
Start: 2021-02-23 | End: 2021-02-23

## 2021-02-23 RX ADMIN — CEPHALEXIN 500 MG: 500 CAPSULE ORAL at 04:02

## 2021-02-23 RX ADMIN — SODIUM CHLORIDE 1000 ML: 0.9 INJECTION, SOLUTION INTRAVENOUS at 02:02

## 2021-02-24 ENCOUNTER — TELEPHONE (OUTPATIENT)
Dept: OBSTETRICS AND GYNECOLOGY | Facility: HOSPITAL | Age: 26
End: 2021-02-24

## 2021-02-25 LAB — BACTERIA UR CULT: NORMAL

## 2021-03-12 ENCOUNTER — TELEPHONE (OUTPATIENT)
Dept: ORTHOPEDICS | Facility: CLINIC | Age: 26
End: 2021-03-12

## 2021-04-06 ENCOUNTER — PATIENT MESSAGE (OUTPATIENT)
Dept: ADMINISTRATIVE | Facility: HOSPITAL | Age: 26
End: 2021-04-06

## 2021-04-26 ENCOUNTER — PATIENT MESSAGE (OUTPATIENT)
Dept: RESEARCH | Facility: HOSPITAL | Age: 26
End: 2021-04-26

## 2021-07-07 ENCOUNTER — PATIENT MESSAGE (OUTPATIENT)
Dept: ADMINISTRATIVE | Facility: HOSPITAL | Age: 26
End: 2021-07-07

## 2022-02-08 ENCOUNTER — OFFICE VISIT (OUTPATIENT)
Dept: URGENT CARE | Facility: CLINIC | Age: 27
End: 2022-02-08
Payer: MEDICAID

## 2022-02-08 VITALS
WEIGHT: 129 LBS | SYSTOLIC BLOOD PRESSURE: 131 MMHG | RESPIRATION RATE: 18 BRPM | HEART RATE: 120 BPM | BODY MASS INDEX: 20.73 KG/M2 | OXYGEN SATURATION: 98 % | DIASTOLIC BLOOD PRESSURE: 87 MMHG | TEMPERATURE: 98 F | HEIGHT: 66 IN

## 2022-02-08 DIAGNOSIS — M54.9 MUSCULOSKELETAL BACK PAIN: Primary | ICD-10-CM

## 2022-02-08 LAB
B-HCG UR QL: POSITIVE
BILIRUB UR QL STRIP: NEGATIVE
CTP QC/QA: YES
GLUCOSE UR QL STRIP: NEGATIVE
KETONES UR QL STRIP: NEGATIVE
LEUKOCYTE ESTERASE UR QL STRIP: NEGATIVE
PH, POC UA: 5.5 (ref 5–8)
POC BLOOD, URINE: NEGATIVE
POC NITRATES, URINE: NEGATIVE
PROT UR QL STRIP: NEGATIVE
SP GR UR STRIP: 1.02 (ref 1–1.03)
UROBILINOGEN UR STRIP-ACNC: NORMAL (ref 0.1–1.1)

## 2022-02-08 PROCEDURE — 3079F DIAST BP 80-89 MM HG: CPT | Mod: CPTII,S$GLB,, | Performed by: NURSE PRACTITIONER

## 2022-02-08 PROCEDURE — 3075F SYST BP GE 130 - 139MM HG: CPT | Mod: CPTII,S$GLB,, | Performed by: NURSE PRACTITIONER

## 2022-02-08 PROCEDURE — 3075F PR MOST RECENT SYSTOLIC BLOOD PRESS GE 130-139MM HG: ICD-10-PCS | Mod: CPTII,S$GLB,, | Performed by: NURSE PRACTITIONER

## 2022-02-08 PROCEDURE — 99213 PR OFFICE/OUTPT VISIT, EST, LEVL III, 20-29 MIN: ICD-10-PCS | Mod: S$GLB,,, | Performed by: NURSE PRACTITIONER

## 2022-02-08 PROCEDURE — 81003 POCT URINALYSIS, DIPSTICK, AUTOMATED, W/O SCOPE: ICD-10-PCS | Mod: QW,S$GLB,, | Performed by: NURSE PRACTITIONER

## 2022-02-08 PROCEDURE — 81025 POCT URINE PREGNANCY: ICD-10-PCS | Mod: S$GLB,,, | Performed by: NURSE PRACTITIONER

## 2022-02-08 PROCEDURE — 99213 OFFICE O/P EST LOW 20 MIN: CPT | Mod: S$GLB,,, | Performed by: NURSE PRACTITIONER

## 2022-02-08 PROCEDURE — 1160F RVW MEDS BY RX/DR IN RCRD: CPT | Mod: CPTII,S$GLB,, | Performed by: NURSE PRACTITIONER

## 2022-02-08 PROCEDURE — 3008F PR BODY MASS INDEX (BMI) DOCUMENTED: ICD-10-PCS | Mod: CPTII,S$GLB,, | Performed by: NURSE PRACTITIONER

## 2022-02-08 PROCEDURE — 1159F PR MEDICATION LIST DOCUMENTED IN MEDICAL RECORD: ICD-10-PCS | Mod: CPTII,S$GLB,, | Performed by: NURSE PRACTITIONER

## 2022-02-08 PROCEDURE — 1160F PR REVIEW ALL MEDS BY PRESCRIBER/CLIN PHARMACIST DOCUMENTED: ICD-10-PCS | Mod: CPTII,S$GLB,, | Performed by: NURSE PRACTITIONER

## 2022-02-08 PROCEDURE — 3008F BODY MASS INDEX DOCD: CPT | Mod: CPTII,S$GLB,, | Performed by: NURSE PRACTITIONER

## 2022-02-08 PROCEDURE — 81003 URINALYSIS AUTO W/O SCOPE: CPT | Mod: QW,S$GLB,, | Performed by: NURSE PRACTITIONER

## 2022-02-08 PROCEDURE — 3079F PR MOST RECENT DIASTOLIC BLOOD PRESSURE 80-89 MM HG: ICD-10-PCS | Mod: CPTII,S$GLB,, | Performed by: NURSE PRACTITIONER

## 2022-02-08 PROCEDURE — 81025 URINE PREGNANCY TEST: CPT | Mod: S$GLB,,, | Performed by: NURSE PRACTITIONER

## 2022-02-08 PROCEDURE — 1159F MED LIST DOCD IN RCRD: CPT | Mod: CPTII,S$GLB,, | Performed by: NURSE PRACTITIONER

## 2022-02-08 NOTE — PROGRESS NOTES
"Subjective:       Patient ID: Michela No is a 26 y.o. female.    Vitals:  height is 5' 6" (1.676 m) and weight is 58.5 kg (129 lb). Her temporal temperature is 98.1 °F (36.7 °C). Her blood pressure is 131/87 and her pulse is 120 (abnormal). Her respiration is 18 and oxygen saturation is 98%.     Chief Complaint: Back Pain (I think I slipped a disc in my back - Entered by patient)    Pt has hx of cerebral palsy. She also has had stress fractures in her back do to cerebral palsy. She said her back pain started to become unbearable about 9 months ago after having her son. Pt denies any injury. Pt informed me that she just found out she is pregnant a few days ago with a positive pregnancy test.     Back Pain  This is a chronic problem. Episode onset: about 9 months. The problem occurs constantly. The problem has been waxing and waning since onset. The pain is present in the lumbar spine. The quality of the pain is described as aching, cramping, shooting and stabbing. The pain radiates to the left knee (down both legs). The pain is at a severity of 7/10. The pain is moderate. The symptoms are aggravated by position. Associated symptoms include leg pain, numbness and tingling. Pertinent negatives include no chest pain, dysuria, fever or headaches. Treatments tried: BC powder, lidocaine patch. The treatment provided mild relief.       Constitution: Negative for chills, sweating, fatigue and fever.   Cardiovascular: Negative for chest pain and sob on exertion.   Genitourinary: Negative for dysuria.   Musculoskeletal: Positive for pain, back pain and history of spine disorder. Negative for trauma.   Neurological: Positive for numbness. Negative for headaches.       Objective:      Physical Exam   Constitutional: She is oriented to person, place, and time. Vital signs are normal. She appears well-developed and well-nourished. She is active and cooperative.  Non-toxic appearance. She does not appear ill. No distress. "   HENT:   Head: Normocephalic and atraumatic.   Mouth/Throat: Oropharynx is clear and moist and mucous membranes are normal.   Eyes: Lids are normal.   Cardiovascular: Intact distal pulses.   Pulmonary/Chest: Effort normal.   Musculoskeletal:         General: No deformity or edema.      Lumbar back: She exhibits decreased range of motion. She exhibits no deformity and no laceration.        Back:    Neurological: She is alert and oriented to person, place, and time. She has normal strength and normal reflexes. No sensory deficit. Gait (antalgic gait) abnormal.   Skin: Skin is warm, dry and not diaphoretic.   Psychiatric: She has a normal mood and affect. Her speech is normal and behavior is normal. Judgment and thought content normal. Cognition and memory  Nursing note and vitals reviewed.    Results for orders placed or performed in visit on 02/08/22   POCT Urinalysis, Dipstick, Automated, W/O Scope   Result Value Ref Range    POC Blood, Urine Negative Negative    POC Bilirubin, Urine Negative Negative    POC Urobilinogen, Urine norm 0.1 - 1.1    POC Ketones, Urine Negative Negative    POC Protein, Urine Negative Negative    POC Nitrates, Urine Negative Negative    POC Glucose, Urine Negative Negative    pH, UA 5.5 5 - 8    POC Specific Gravity, Urine 1.025 1.003 - 1.029    POC Leukocytes, Urine Negative Negative   POCT urine pregnancy   Result Value Ref Range    POC Preg Test, Ur Positive (A) Negative     Acceptable Yes            Assessment:       1. Musculoskeletal back pain          Plan:         Musculoskeletal back pain  -     POCT Urinalysis, Dipstick, Automated, W/O Scope  -     POCT urine pregnancy        patient is pregnant, confirmed with poct urine pregnancy test-- discussed with patient cannot do xray or give anti-inflammatories.  Recommended ice and heat for back pain at this time. Discussed that it is usually recommended that she refrain from any medication, prescribed and over the  counter during first trimester.

## 2022-02-08 NOTE — PATIENT INSTRUCTIONS
Return to Urgent Care or go to ER if symptoms worsen or fail to improve.  Follow up with PCP as recommended for further management.           Patient Education       Low Back Pain Discharge Instructions   About this topic   Low back pain is a pain or discomfort in the lower part of your back and spinal column. You may have a muscle strain. This happens when a muscle is stretched too much or works too hard. It can also happen if a muscle is stretched too quickly. This is also known as a pulled muscle. Many people have low back pain at some point and it most often gets better on its own.       What care is needed at home?   Back pain is common. In most cases, your back will feel better in 1 to 3 weeks. You may need to have help at home if you are not able to do your normal activities right away. Some people need help with things like cooking or bathing.  · Ask your doctor what you need to do when you go home. Make sure you ask questions if you do not understand what the doctor says. This way you will know what you need to do.  · For the first 2 days, put ice on your back a few times a day. Wrap an ice pack in a towel and put it on your back for 10 to 15 minutes at a time. After 2 days, you may want to use heat on your back. Put a heating pad on your back for 20 minutes at a time a few times each day. Never go to sleep with heat or ice on your back.  · Stay as active as you can without causing too much pain. It is OK to rest your back for a day or so. Be sure to get up and move around gently during the day as you are able. After a few days, slowly start to increase your activity level as you are able to. If something causes your pain to come back or get worse, stop and go back to doing easier activities that did not hurt.  · Protect your back.  ? Limit sports, twisting, and heavy lifting until you are fully recovered.  ? Practice good posture to lower pressure on your spine.  ? When lifting, hold the object close to  your body, keep your back straight, and use your leg muscles to slowly stand.  · Do not sit or  one position for a long time. You may want to sleep with a pillow under or between your knees if this eases your pain.  · You may want to take medicine like ibuprofen or naproxen for swelling and pain. These are nonsteroidal anti-inflammatory drugs (NSAIDs).  · A lumbar support belt may help you be more comfortable. This supports your pelvis and eases pain.  · Your doctor may order exercises to help your back. Be sure to do these as ordered.  What follow-up care is needed?   Your doctor may ask you to make visits to the office to check on your progress. Be sure to keep these visits. Your doctor may send you to other experts and therapists to help you with your pain.   What drugs may be needed?   The doctor may order drugs to:   · Help with pain and swelling  · Relax your muscles  Will physical activity be limited?   You may have to limit your activity. Talk to your doctor about the right amount of activity for you.   When do I need to call the doctor?   · You are unable to walk or cannot control your bowels or bladder.  · You develop a fever of 100.4°F (38°C) or higher, chills, or night sweats  · Your legs are numb, weak, or tingly.  · Your pain is getting worse, even with medicines and rest.  · You feel weak and light-headed.  · You develop any of the following:  ? Belly pain.  ? Throwing up.  ? Pain with urination or need to urinate more often.  ? Vaginal pain or discharge.  ? Rash.  Teach Back: Helping You Understand   The Teach Back Method helps you understand the information we are giving you. After you talk with the staff, tell them in your own words what you learned. This helps to make sure the staff has described each thing clearly. It also helps to explain things that may have been confusing. Before going home, make sure you can do these:  · I can tell you about my pain.  · I can tell you what may help  ease my pain.  · I can tell you what I will do if I have numbness or tingling in my legs, feet, or genitals.  Where can I learn more?   American Academy of Family Physicians  https://familydoctor.org/condition/low-back-pain/   National [place] of Arth[employer] and Musculoskeletal and Skin Diseases  http://www.niams.nih.gov/Health_Info/Back_Pain/back_pain_ff.asp   NHS Choices  https://www.nhs.uk/Conditions/Back-pain/   Last Reviewed Date   2021-06-04  Consumer Information Use and Disclaimer   This information is not specific medical advice and does not replace information you receive from your health care provider. This is only a brief summary of general information. It does NOT include all information about conditions, illnesses, injuries, tests, procedures, treatments, therapies, discharge instructions or life-style choices that may apply to you. You must talk with your health care provider for complete information about your health and treatment options. This information should not be used to decide whether or not to accept your health care providers advice, instructions or recommendations. Only your health care provider has the knowledge and training to provide advice that is right for you.   Copyright   Copyright © 2021 [employer] and its affiliates and/or licensors. All rights reserved.

## 2022-02-21 ENCOUNTER — PATIENT MESSAGE (OUTPATIENT)
Dept: OBSTETRICS AND GYNECOLOGY | Facility: CLINIC | Age: 27
End: 2022-02-21
Payer: MEDICAID

## 2022-02-26 ENCOUNTER — PATIENT MESSAGE (OUTPATIENT)
Dept: OBSTETRICS AND GYNECOLOGY | Facility: CLINIC | Age: 27
End: 2022-02-26
Payer: MEDICAID

## 2022-03-29 ENCOUNTER — PATIENT MESSAGE (OUTPATIENT)
Dept: FAMILY MEDICINE | Facility: CLINIC | Age: 27
End: 2022-03-29
Payer: MEDICAID

## 2022-08-22 ENCOUNTER — HOSPITAL ENCOUNTER (EMERGENCY)
Facility: OTHER | Age: 27
Discharge: HOME | End: 2022-08-22
Attending: STUDENT IN AN ORGANIZED HEALTH CARE EDUCATION/TRAINING PROGRAM
Payer: MEDICAID

## 2022-08-22 VITALS
BODY MASS INDEX: 20.65 KG/M2 | TEMPERATURE: 98 F | DIASTOLIC BLOOD PRESSURE: 59 MMHG | HEIGHT: 66 IN | HEART RATE: 92 BPM | OXYGEN SATURATION: 98 % | WEIGHT: 128.5 LBS | SYSTOLIC BLOOD PRESSURE: 118 MMHG | RESPIRATION RATE: 18 BRPM

## 2022-08-22 DIAGNOSIS — O47.9 UTERINE CONTRACTIONS DURING PREGNANCY: Primary | ICD-10-CM

## 2022-08-22 DIAGNOSIS — Z34.90 PREGNANCY, UNSPECIFIED GESTATIONAL AGE: ICD-10-CM

## 2022-08-22 LAB
ABO + RH BLD: NORMAL
ABO + RH BLD: NORMAL
ALBUMIN SERPL BCP-MCNC: 2.9 G/DL (ref 3.5–5.2)
ALP SERPL-CCNC: 126 U/L (ref 55–135)
ALT SERPL W/O P-5'-P-CCNC: 13 U/L (ref 10–44)
AMPHET+METHAMPHET UR QL: NEGATIVE
ANION GAP SERPL CALC-SCNC: 7 MMOL/L (ref 8–16)
AST SERPL-CCNC: 15 U/L (ref 10–40)
BACTERIA #/AREA URNS HPF: NORMAL /HPF
BARBITURATES UR QL SCN>200 NG/ML: NEGATIVE
BASOPHILS # BLD AUTO: 0.04 K/UL (ref 0–0.2)
BASOPHILS NFR BLD: 0.4 % (ref 0–1.9)
BENZODIAZ UR QL SCN>200 NG/ML: NEGATIVE
BILIRUB SERPL-MCNC: 0.6 MG/DL (ref 0.1–1)
BILIRUB UR QL STRIP: NEGATIVE
BLD GP AB SCN CELLS X3 SERPL QL: NORMAL
BLD GP AB SCN CELLS X3 SERPL QL: NORMAL
BUN SERPL-MCNC: 4 MG/DL (ref 6–20)
BZE UR QL SCN: NEGATIVE
CALCIUM SERPL-MCNC: 9.8 MG/DL (ref 8.7–10.5)
CANDIDA RRNA VAG QL PROBE: NEGATIVE
CANNABINOIDS UR QL SCN: ABNORMAL
CHLORIDE SERPL-SCNC: 106 MMOL/L (ref 95–110)
CLARITY UR: CLEAR
CO2 SERPL-SCNC: 23 MMOL/L (ref 23–29)
COLOR UR: YELLOW
CREAT SERPL-MCNC: 0.6 MG/DL (ref 0.5–1.4)
CREAT UR-MCNC: 39.7 MG/DL (ref 15–325)
CTP QC/QA: YES
DIFFERENTIAL METHOD: ABNORMAL
EOSINOPHIL # BLD AUTO: 0.1 K/UL (ref 0–0.5)
EOSINOPHIL NFR BLD: 0.8 % (ref 0–8)
ERYTHROCYTE [DISTWIDTH] IN BLOOD BY AUTOMATED COUNT: 12.7 % (ref 11.5–14.5)
EST. GFR  (NO RACE VARIABLE): >60 ML/MIN/1.73 M^2
G VAGINALIS RRNA GENITAL QL PROBE: POSITIVE
GLUCOSE SERPL-MCNC: 80 MG/DL (ref 70–110)
GLUCOSE UR QL STRIP: ABNORMAL
HCT VFR BLD AUTO: 33.3 % (ref 37–48.5)
HGB BLD-MCNC: 11 G/DL (ref 12–16)
HGB UR QL STRIP: NEGATIVE
HIV1+2 IGG SERPL QL IA.RAPID: NORMAL
IMM GRANULOCYTES # BLD AUTO: 0.14 K/UL (ref 0–0.04)
IMM GRANULOCYTES NFR BLD AUTO: 1.3 % (ref 0–0.5)
INJECT RH IG VOL PATIENT: NORMAL ML
KETONES UR QL STRIP: NEGATIVE
LEUKOCYTE ESTERASE UR QL STRIP: NEGATIVE
LYMPHOCYTES # BLD AUTO: 1.6 K/UL (ref 1–4.8)
LYMPHOCYTES NFR BLD: 14.7 % (ref 18–48)
MCH RBC QN AUTO: 31.4 PG (ref 27–31)
MCHC RBC AUTO-ENTMCNC: 33 G/DL (ref 32–36)
MCV RBC AUTO: 95 FL (ref 82–98)
METHADONE UR QL SCN>300 NG/ML: NEGATIVE
MICROSCOPIC COMMENT: NORMAL
MONOCYTES # BLD AUTO: 0.9 K/UL (ref 0.3–1)
MONOCYTES NFR BLD: 8.2 % (ref 4–15)
NEUTROPHILS # BLD AUTO: 8.1 K/UL (ref 1.8–7.7)
NEUTROPHILS NFR BLD: 74.6 % (ref 38–73)
NITRITE UR QL STRIP: NEGATIVE
NRBC BLD-RTO: 0 /100 WBC
OPIATES UR QL SCN: NEGATIVE
PCP UR QL SCN>25 NG/ML: NEGATIVE
PH UR STRIP: 6 [PH] (ref 5–8)
PLATELET # BLD AUTO: 290 K/UL (ref 150–450)
PMV BLD AUTO: 9.2 FL (ref 9.2–12.9)
POTASSIUM SERPL-SCNC: 4 MMOL/L (ref 3.5–5.1)
PROT SERPL-MCNC: 6.7 G/DL (ref 6–8.4)
PROT UR QL STRIP: NEGATIVE
RBC # BLD AUTO: 3.5 M/UL (ref 4–5.4)
SARS-COV-2 RDRP RESP QL NAA+PROBE: NEGATIVE
SODIUM SERPL-SCNC: 136 MMOL/L (ref 136–145)
SP GR UR STRIP: 1.02 (ref 1–1.03)
T VAGINALIS RRNA GENITAL QL PROBE: NEGATIVE
TOXICOLOGY INFORMATION: ABNORMAL
URN SPEC COLLECT METH UR: ABNORMAL
UROBILINOGEN UR STRIP-ACNC: NEGATIVE EU/DL
WBC # BLD AUTO: 10.85 K/UL (ref 3.9–12.7)
YEAST URNS QL MICRO: NORMAL

## 2022-08-22 PROCEDURE — 99284 PR EMERGENCY DEPT VISIT,LEVEL IV: ICD-10-PCS | Mod: 25,,, | Performed by: OBSTETRICS & GYNECOLOGY

## 2022-08-22 PROCEDURE — 86850 RBC ANTIBODY SCREEN: CPT | Performed by: INTERNAL MEDICINE

## 2022-08-22 PROCEDURE — 87510 GARDNER VAG DNA DIR PROBE: CPT

## 2022-08-22 PROCEDURE — 86762 RUBELLA ANTIBODY: CPT | Performed by: OBSTETRICS & GYNECOLOGY

## 2022-08-22 PROCEDURE — 80307 DRUG TEST PRSMV CHEM ANLYZR: CPT | Performed by: OBSTETRICS & GYNECOLOGY

## 2022-08-22 PROCEDURE — 86803 HEPATITIS C AB TEST: CPT | Performed by: PHYSICIAN ASSISTANT

## 2022-08-22 PROCEDURE — 96372 THER/PROPH/DIAG INJ SC/IM: CPT

## 2022-08-22 PROCEDURE — 59025 FETAL NON-STRESS TEST: CPT

## 2022-08-22 PROCEDURE — 63600519 RHOGAM PHARM REV CODE 636 ALT 250 W HCPCS

## 2022-08-22 PROCEDURE — 59025 FETAL NON-STRESS TEST: CPT | Mod: 26,,, | Performed by: OBSTETRICS & GYNECOLOGY

## 2022-08-22 PROCEDURE — 86592 SYPHILIS TEST NON-TREP QUAL: CPT | Performed by: OBSTETRICS & GYNECOLOGY

## 2022-08-22 PROCEDURE — 81000 URINALYSIS NONAUTO W/SCOPE: CPT | Mod: 59 | Performed by: INTERNAL MEDICINE

## 2022-08-22 PROCEDURE — 87591 N.GONORRHOEAE DNA AMP PROB: CPT

## 2022-08-22 PROCEDURE — 80074 ACUTE HEPATITIS PANEL: CPT | Performed by: OBSTETRICS & GYNECOLOGY

## 2022-08-22 PROCEDURE — 99291 CRITICAL CARE FIRST HOUR: CPT | Mod: 25,CS,, | Performed by: STUDENT IN AN ORGANIZED HEALTH CARE EDUCATION/TRAINING PROGRAM

## 2022-08-22 PROCEDURE — 80053 COMPREHEN METABOLIC PANEL: CPT | Performed by: INTERNAL MEDICINE

## 2022-08-22 PROCEDURE — 99284 EMERGENCY DEPT VISIT MOD MDM: CPT | Mod: 25

## 2022-08-22 PROCEDURE — 76815 PR  US,PREGNANT UTERUS,LIMITED, 1/> FETUSES: ICD-10-PCS | Mod: 26,,, | Performed by: STUDENT IN AN ORGANIZED HEALTH CARE EDUCATION/TRAINING PROGRAM

## 2022-08-22 PROCEDURE — 86703 HIV-1/HIV-2 1 RESULT ANTBDY: CPT | Performed by: OBSTETRICS & GYNECOLOGY

## 2022-08-22 PROCEDURE — U0002 COVID-19 LAB TEST NON-CDC: HCPCS | Performed by: INTERNAL MEDICINE

## 2022-08-22 PROCEDURE — 59025 PR FETAL 2N-STRESS TEST: ICD-10-PCS | Mod: 26,,, | Performed by: OBSTETRICS & GYNECOLOGY

## 2022-08-22 PROCEDURE — 87389 HIV-1 AG W/HIV-1&-2 AB AG IA: CPT | Performed by: PHYSICIAN ASSISTANT

## 2022-08-22 PROCEDURE — 87086 URINE CULTURE/COLONY COUNT: CPT | Performed by: OBSTETRICS & GYNECOLOGY

## 2022-08-22 PROCEDURE — 87491 CHLMYD TRACH DNA AMP PROBE: CPT

## 2022-08-22 PROCEDURE — 99291 PR CRITICAL CARE, E/M 30-74 MINUTES: ICD-10-PCS | Mod: 25,CS,, | Performed by: STUDENT IN AN ORGANIZED HEALTH CARE EDUCATION/TRAINING PROGRAM

## 2022-08-22 PROCEDURE — 87081 CULTURE SCREEN ONLY: CPT

## 2022-08-22 PROCEDURE — 76815 OB US LIMITED FETUS(S): CPT | Mod: 26,,, | Performed by: STUDENT IN AN ORGANIZED HEALTH CARE EDUCATION/TRAINING PROGRAM

## 2022-08-22 PROCEDURE — 86901 BLOOD TYPING SEROLOGIC RH(D): CPT | Mod: 91 | Performed by: OBSTETRICS & GYNECOLOGY

## 2022-08-22 PROCEDURE — 85025 COMPLETE CBC W/AUTO DIFF WBC: CPT | Performed by: INTERNAL MEDICINE

## 2022-08-22 PROCEDURE — 36415 COLL VENOUS BLD VENIPUNCTURE: CPT | Performed by: OBSTETRICS & GYNECOLOGY

## 2022-08-22 PROCEDURE — 99284 EMERGENCY DEPT VISIT MOD MDM: CPT | Mod: 25,,, | Performed by: OBSTETRICS & GYNECOLOGY

## 2022-08-22 RX ADMIN — HUMAN RHO(D) IMMUNE GLOBULIN 300 MCG: 300 INJECTION, SOLUTION INTRAMUSCULAR at 08:08

## 2022-08-22 NOTE — PROGRESS NOTES
Pt presents to [facility] with c/o contractions. She has had no PNC due to severe anxiety/depression this pregnancy.

## 2022-08-22 NOTE — ED PROVIDER NOTES
Encounter date: 3:39 PM 2022    Source of History   Patient    Chief Complaint   Pt presents with:   Laboring (8.5-9 mos pregnant, not prenatal care states due to depression, cramps , abd pain, discharge baby with decreased activity)      History Of Present Illness   Michela No is a 26 y.o.  female 9 months by report by last menstrual period with history of CP, depression, herpes, PCOS, substance use who presents to the ED with chief complaint of crampy abdominal pain x3 days.  Patient states that she is 8-9 months pregnant based off her last menstrual period.  She has not been receiving prenatal care as she states that she has had anxiety and depression and has been scared to go to the hospital.  She states that she previously had a child that is about 1.5 years old through a vaginal delivery without complications.  She notes that the baby has been moving for the last 1-2 months.  Over the last 3 days she has noted intermittent crampy abdominal pain.  Today she states that she had 2 bouts of cramps that started 3 hours prior to arrival.  On my exam she states that she feels a new cramping starting.  She denies any loss clear fluids, water breakage, vaginal bleeding, dysuria or pyuria or vaginal lesions.  Of note she has not been taking any of her medications including her Valtrex, prenatal vitamins or any of her psychiatric medications.  She denies any attempts at self-harm, SI/HI, AVH, recent drug use.  She states that she stopped smoking tobacco when she found out she was pregnant.      This is the extent to the patients complaints today here in the emergency department.    Review Of Systems   As per HPI and below:  Constitutional: No fever.   HEENT: No voice change. No sore throat .    Eyes:  No visual disturbances. No eye pain.   Respiratory:  No shortness of breath. No wheezing. No cough.   Cardio:  No chest pain. No leg swelling. No palpitations.   GI:  + abdominal pain. No nausea or vomiting.  No diarrhea.   :  No blood in urine. No difficulty urinating.   MSK:  No back pain. No neck pain.   Skin: No rash.   Neurologic: No headache. No dizziness.       Review of patient's allergies indicates:   Allergen Reactions    Fish containing products Swelling       No current facility-administered medications on file prior to encounter.     Current Outpatient Medications on File Prior to Encounter   Medication Sig Dispense Refill    benzonatate (TESSALON) 200 MG capsule Take 1 capsule (200 mg total) by mouth 3 (three) times daily as needed for Cough. (Patient not taking: No sig reported) 90 capsule 1    cyclobenzaprine (FLEXERIL) 5 MG tablet   5    fluticasone propionate (FLONASE) 50 mcg/actuation nasal spray 2 sprays (100 mcg total) by Each Nare route once daily. (Patient not taking: Reported on 1/6/2020) 1 Bottle 2    folic acid (FOLVITE) 1 MG tablet Take 4 tablets (4 mg total) by mouth once daily. (Patient not taking: Reported on 1/29/2021) 120 tablet 7    loratadine (CLARITIN) 10 mg tablet Take 1 tablet (10 mg total) by mouth once daily. (Patient not taking: No sig reported) 30 tablet 2    methylPREDNISolone (MEDROL DOSEPACK) 4 mg tablet use as directed (Patient not taking: No sig reported) 1 Package 0    naproxen sodium (ANAPROX) 550 MG tablet TK 1 T PO Q 12 H PRN  1    ondansetron (ZOFRAN) 4 MG tablet Take 2 tablets (8 mg total) by mouth every 12 (twelve) hours as needed. (Patient not taking: Reported on 2/8/2022) 12 tablet 0    PNV,calcium 72-iron-folic acid (PRENATAL VITAMIN PLUS LOW IRON) 27 mg iron- 1 mg Tab Take one tablet daily.  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    Herpes simplex virus (HSV) infection     Mood disorder 7/25/2013    PCOS (polycystic ovarian syndrome)     as per pt    Personality disorder 9/30/2012    Pseudoseizures 7/29/2013    Seizures 2010    Self-harming behavior     Substance abuse      Past Surgical History:   Procedure Laterality Date    FOOT TENDON SURGERY         Social History     Socioeconomic History    Marital status: Single   Tobacco Use    Smoking status: Current Every Day Smoker     Packs/day: 0.50     Years: 6.00     Pack years: 3.00     Types: Cigarettes    Smokeless tobacco: Never Used    Tobacco comment: marijuana and cigarettes   Substance and Sexual Activity    Alcohol use: Yes     Comment: heavily    Drug use: Yes     Types: Methamphetamines, Marijuana     Comment: 4 months    Sexual activity: Yes     Partners: Male     Birth control/protection: None     Comment: condom during outbreak   Other Topics Concern    Patient feels they ought to cut down on drinking/drug use No    Patient annoyed by others criticizing their drinking/drug use No    Patient has felt bad or guilty about drinking/drug use No    Patient has had a drink/used drugs as an eye opener in the AM No   Social History Narrative    .She lives with both parents and younger brother. She works part-time over the week ends in the [place] as a .Michela is now working at [employer] full time.She is living with her fivanessae and 2 other friends in an apt. In the [place].       Family History   Adopted: Yes   Problem Relation Age of Onset    No Known Problems Mother     No Known Problems Father     Glaucoma Neg Hx        Physical Exam     Vitals:    08/22/22 1515 08/22/22 1618 08/22/22 1731   BP: 139/74 108/73 (!) 118/59   BP Location:  Left arm    Patient Position:  Lying    Pulse: (!) 126 98 92   Resp: 18  "18    Temp: 98.2 °F (36.8 °C)  97.5 °F (36.4 °C)   TempSrc: Oral  Oral   SpO2: 100% 98% 98%   Weight: 58.3 kg (128 lb 8.5 oz)     Height: 5' 6" (1.676 m)       Physical Exam:   Nursing note and vitals reviewed.  Appearance:  Well-appearing, nontoxic adult female in no acute respiratory distress.  Making purposeful movements.  Speaking in full sentences.  Skin: No rashes seen.  Good turgor.  No abrasions.  No ecchymoses.  Eyes: No conjunctival injection. EOMI, PERRL.  Head: NC/AT  ENT: Oropharynx clear.    Chest: CTAB. No increased work of breathing, bilateral chest rise.  Cardiovascular: Regular rate and rhythm.  Normal equal bilateral radial pulses.  Abdomen: Soft.  Gravid.  Nontender.  No guarding.  No rebound. No Masses.   Cervical Exam:  Sterile cervical exam performed by myself and attending with nursing chaperone present demonstrates no external vaginal lesions, cervix is dilated to 1 cm without effacement.   Musculoskeletal: Good range of motion all joints.  No deformities.  Neck supple, full range of motion, no obvious deformity.  Neurologic: Moves all extremities.  Normal sensation.  No facial droop.  Normal speech.    Mental Status:  Alert and oriented x 3.  Appropriate, conversant.      Results and Medications    Procedures    Labs Reviewed   CBC W/ AUTO DIFFERENTIAL - Abnormal; Notable for the following components:       Result Value    RBC 3.50 (*)     Hemoglobin 11.0 (*)     Hematocrit 33.3 (*)     MCH 31.4 (*)     Immature Granulocytes 1.3 (*)     Gran # (ANC) 8.1 (*)     Immature Grans (Abs) 0.14 (*)     Gran % 74.6 (*)     Lymph % 14.7 (*)     All other components within normal limits   COMPREHENSIVE METABOLIC PANEL - Abnormal; Notable for the following components:    BUN 4 (*)     Albumin 2.9 (*)     Anion Gap 7 (*)     All other components within normal limits   HIV 1 / 2 ANTIBODY   HEPATITIS C ANTIBODY   URINALYSIS, REFLEX TO URINE CULTURE   HEPATITIS PANEL, ACUTE   RPR   RAPID HIV   DRUG SCREEN " "PANEL, URINE EMERGENCY   SARS-COV-2 RDRP GENE   TYPE & SCREEN   POCT GLUCOSE MONITORING CONTINUOUS       Imaging Results    None             Medications - No data to display    MDM, Impression and Plan   Previous Records:   I decided to obtain old medical records which is listed here or in ED course:  History of CP, seizures, mood and personality disorder    Initial Assessment:   Urgent evaluation of 26 y.o. female  female who states that she is actively pregnant, no prenatal care who presents the ED with chief complaint of abdominal pain, decreased movement of fetus x3 days.  On arrival to the ED she is noted to be afebrile, tachycardic with a stable blood pressure in no acute respiratory distress.  Denies vaginal bleeding, dysuria, leakage of clear fluids, ruptured membranes.    Differential Diagnosis:   -pregnancy   -Saroj Lomax   -precipitous delivery     Clinical Tests:   Lab Tests: Ordered and Reviewed  Radiological Study: Ordered and Reviewed  Medical Tests: Ordered and Reviewed    ED Management:    Patient's tachycardia spontaneously resolved.  Bedside ultrasound was performed and fetal heart rate was noted to be 150.  Fetus noted to be in horizontal lie.  Sterile cervical exam performed and patient is noted to be 1 cm dialted without effacement. Based off of ED u/s performed in the ED estimated gestational age of 33 weeks.  COVID noted to be negative.  She is cessation of her "contraction pain".  I called and discussed the case with Dr. Caballero who [facility]. I belive her symptoms could be [place], but I do believe that she would benefit from transfer and evaluation by OB.   We discussed the case and they accepted the patient to be transferred to [facility] OB ED.  Patient agreeable with transfer.  Patient transferred to [facility].  I called and discussed the case with:  OBGYN, transfer center    Please see ED course for discussion of workup and " dispo if not listed above.              Final diagnoses:  [Z34.90] Pregnancy, unspecified gestational age (Primary)  [O47.9] Uterine contractions during pregnancy          ED Disposition Condition    Transfer to Another Facility Stable                      Attending Attestation:   Physician Attestation Statement for Resident:  As the supervising MD  -: Attending Attestation:   I oversaw the resident's evaluation of the patient and have evaluated the patient myself. The case was discussed with the resident.   Please see the resident note for further details regarding the patient's history, physical exam, any pertinent imaging or lab results or consultations.  I have reviewed and agree with the documented findings, interpretation of studies and results, and plan of care. I was present for the key portions of any procedure(s) performed and immediately available for the remainder of the procedure(s).  Any exceptions are noted below.  Devendra VEGAAlvarado Talley, DO    26-year-old  001 at unknown weeks gestation presenting with occasional contractions.  She has had 3 or 4 contractions per day for the last several days.  She believes she is around 8 or 9 months pregnant .  She has had 1 previous vaginal delivery without any related complications.  She reports no prenatal care secondary to significant anxiety and personality disorders.  She denies leakage of fluid but has been swimming occasionally.  No bloody show or mucus.  The pain she is experiencing is taking like contractions in a come once every several hours.  No fever.  Denies alcohol or drug use at this time.    On exam she is hemodynamically stable.  She is well-appearing in no acute distress.  She has a gravid abdomen.  Bedside ultrasound shows an active fetus with fetal heart rate around 150, measuring at about 31-33 weeks gestational age based on long bone length and biparietal diameter.  Please see separate note for further details.  There is no active descent or  showing a fetal parts.  Sterile pelvic exam performed.  She is finger tip, not significantly effaced, high cervix.Transfer to [facility] for obstetric care.    Critical Care:  Date: 08/22/2022  Performed by: Dr. Devendra Talley  Authorized by: Dr. Devendra Talley   Total critical care time (exclusive of procedural time) : 30 minutes  Upon my evaluation, this patient had a high probability of imminent or life-threatening deterioration due to possible early labor which required my direct attention, intervention and personal management.  Critical care was necessary to treat or prevent imminent or life-threatening deterioration.  This may include review of laboratory data, radiology results, discussion with consultants and family, and monitoring for potential decompensations. Interventions were performed as documented.                        Jeannine Rascon MD   Emergency Resident   [phone] (spectra)    Please note that this dictation was completed with computer voicerecognition software.  Quite often unanticipated grammatical, syntax, homophones, and other interpretive errors are inadvertently transcribed by the computer software.  Please disregard these errors.  Please excuse any errors that have escaped final proofreading.       Jeannine Rascon MD  Resident  08/22/22 0166       Devendra Talley, DO  08/22/22 9815

## 2022-08-22 NOTE — ED PROVIDER NOTES
"Encounter Date: 2022       History     Chief Complaint   Patient presents with    Laboring     8.5-9 mos pregnant, not prenatal care states due to depression, cramps , abd pain, discharge baby with decreased activity     HPI   Michela No is a 26 y.o. [id number] at >30wk gestation age (per patient) who presents complaining of contractions. She has been having 3-4 contractions per day over the last week which she describes as increasing in strength. Reports she has not had any prenatal care during this pregnancy because she "does not like going to the doctor," has a significant history of anxiety. Reports decreased fetal movement over the past three days. Reports 2-3 months of vaginal itching/burning, pain with sex, pain with urination, worried about having an infection.    She had one previous pregnancy via vaginal delivery without any complications. She is Rh- and received Rhogram during her last pregnancy.     This IUP is complicated by lack of PNC, HSV (denies active lesions, not on meds), PCOS, complicated psych hx (boderline personality disorder, mood disorder, depression/anxiety, ADHD, seizures vs. Pseudoseizures, self-harming behavior, substance abuse), cerebral palsy, headaches, allergies.      Patient reports contractions, denies vaginal bleeding, denies LOF. Fetal Movement: decreased.     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    Herpes simplex virus (HSV) infection     Mood disorder 2013    PCOS (polycystic ovarian syndrome)     as per pt    Personality disorder 2012    Pseudoseizures 2013    Seizures  "    Self-harming behavior     Substance abuse      Past Surgical History:   Procedure Laterality Date    FOOT TENDON SURGERY       Family History   Adopted: Yes   Problem Relation Age of Onset    No Known Problems Mother     No Known Problems Father     Glaucoma Neg Hx      Social History     Tobacco Use    Smoking status: Current Every Day Smoker     Packs/day: 0.50     Years: 6.00     Pack years: 3.00     Types: Cigarettes    Smokeless tobacco: Never Used    Tobacco comment: marijuana and cigarettes   Substance Use Topics    Alcohol use: Yes     Comment: heavily    Drug use: Yes     Types: Methamphetamines, Marijuana     Comment: 4 months     Review of Systems   Constitutional: Negative for chills and fever.   HENT: Negative for congestion and sore throat.    Eyes: Negative for visual disturbance.   Respiratory: Negative for cough, shortness of breath and wheezing.    Cardiovascular: Negative for chest pain.   Gastrointestinal: Positive for abdominal pain. Negative for constipation, diarrhea, nausea and vomiting.   Genitourinary: Positive for dyspareunia, dysuria, pelvic pain and vaginal pain. Negative for difficulty urinating, vaginal bleeding and vaginal discharge.        Vaginal itching and vaginal discomfort   Musculoskeletal: Negative for back pain.   Skin: Negative for rash.   Neurological: Negative for headaches.   Hematological: Does not bruise/bleed easily.       Physical Exam     Initial Vitals [08/22/22 1515]   BP Pulse Resp Temp SpO2   139/74 (!) 126 18 98.2 °F (36.8 °C) 100 %      MAP       --         Physical Exam    Vitals reviewed.  Constitutional: Vital signs are normal. She appears well-developed and well-nourished. Breathing: Normal. She is not diaphoretic. No distress.   HENT:   Head: Normocephalic and atraumatic.   Eyes: EOM are normal.   Neck:   Normal range of motion.  Cardiovascular: Normal rate, intact distal pulses and normal pulses.   Pulmonary/Chest: No respiratory distress.    Normal work of breathing   Abdominal: Abdomen is soft. There is no abdominal tenderness. There is no rebound and no guarding.   Musculoskeletal:      Cervical back: Normal range of motion.     Neurological: She is alert and oriented to person, place, and time. She has normal strength.   Skin: Skin is warm and dry.   Psychiatric: Her behavior is normal. Her mood appears anxious. Her speech is tangential.     OB LABOR EXAM:       Method: Sterile vaginal exam per MD and Sterile speculum exam per MD.       Dilatation: 1.   Station: -3.     Amniotic Fluid Color: no fluid.     Comments: No vaginal/vulvar/cervical HSV lesions noted on speculum exam, physiologic discharge, no blood noted on exam.  Cervix 1cm, thick, posterior at -3.       ED Course   Obtain Fetal nonstress test (NST)    Date/Time: 8/23/2022 3:50 AM  Performed by: Mony Carlsile MD  Authorized by: Mony Carlisle MD     Nonstress Test:     Variability:  6-25 BPM    Decelerations:  None    Accelerations:  15 bpm    Baseline:  150    Uterine Irritability: Yes    Biophysical Profile:     Nonstress Test Interpretation: reactive      Overall Impression:  Reassuring      Labs Reviewed   VAGINOSIS SCREEN BY DNA PROBE - Abnormal; Notable for the following components:       Result Value    Gardnerella vaginalis Positive (*)     All other components within normal limits    Narrative:     Release to patient->Immediate   CBC W/ AUTO DIFFERENTIAL - Abnormal; Notable for the following components:    RBC 3.50 (*)     Hemoglobin 11.0 (*)     Hematocrit 33.3 (*)     MCH 31.4 (*)     Immature Granulocytes 1.3 (*)     Gran # (ANC) 8.1 (*)     Immature Grans (Abs) 0.14 (*)     Gran % 74.6 (*)     Lymph % 14.7 (*)     All other components within normal limits   COMPREHENSIVE METABOLIC PANEL - Abnormal; Notable for the following components:    BUN 4 (*)     Albumin 2.9 (*)     Anion Gap 7 (*)     All other components within normal limits   URINALYSIS, REFLEX TO URINE CULTURE -  Abnormal; Notable for the following components:    Glucose, UA 3+ (*)     All other components within normal limits    Narrative:     Specimen Source->Urine   DRUG SCREEN PANEL, URINE EMERGENCY - Abnormal; Notable for the following components:    THC Presumptive Positive (*)     All other components within normal limits    Narrative:     Specimen Source->Urine   CULTURE, URINE    Narrative:     Indicated criteria for high risk culture:->Pregnant   C. TRACHOMATIS/N. GONORRHOEAE BY AMP DNA    Narrative:     Sources by Resulting Lab:->[facility]  Release to patient->Immediate   STREP B SCREEN, VAGINAL / RECTAL    Narrative:     Allergic to Penicillin->No   CULTURE, URINE   HIV 1 / 2 ANTIBODY    Narrative:     Release to patient->Immediate   HEPATITIS C ANTIBODY    Narrative:     Release to patient->Immediate   HEPATITIS PANEL, ACUTE    Narrative:     Release to patient->Immediate   RPR    Narrative:     Release to patient->Immediate   RAPID HIV    Narrative:     Release to patient->Immediate   RUBELLA ANTIBODY, IGG    Narrative:     Release to patient->Immediate   URINALYSIS MICROSCOPIC    Narrative:     Specimen Source->Urine   SARS-COV-2 RDRP GENE   TYPE & SCREEN   TYPE & SCREEN   PREPARE RH IMMUNE GLOBULIN          Imaging Results    None        Bedside ultrasound in [facility] on 8/22/2022 (see photo below):  GA: 32w3d        ED US pelvis OB 08/23/2022  Exam : Brian Encarnacion   POCUS_OB_TA/TV:     Exam Information:   Exam category:  Diagnostic   Consent obtained?:  Yes     Indication(s) for Exam:   Initial exam, Pregnancy, Abdominal pain     Views Obtained:   Transabdominal transverse:  Obtained   Transabdominal sagittal:  Obtained   Endovaginal sagittal:  Not obtained   Endovaginal coronal:  Not obtained   Cul-de-sac:  Obtained   Left adnexa / ovary:  Not obtained   Right adnexa / ovary:  Not obtained   Hepatorenal (RUQ):  Not obtained     Findings:   Intrauterine contents:  Fetus   Cul-de-sac:  No fluid   Morison's  (RUQ):  N/A   Fetal heart rate (bpm):  138   Estimated gestational age (weeks-days):  33   Gestational age calculated by::  Biparietal diameter   Location of IUP:  Fundus     Interpretation:   Live IUP   Other:  FHR 138bpm, lateral lie         Medications   rho(D) immune globulin injection 300 mcg (300 mcg Intramuscular Given 8/22/22 2007)     Medical Decision Making:   ED Management:  NST reactive and reassuring, no contractions noted on toco  Cervical exam reassuring, no concern for labor at this time  No active HSV lesions on exam    Patient with limited PNC - Prenatal labs collected - HIV negative. Pending RPR, Hep panel.  Growth scan in [facility] dates patient at 32w3d  GBS, GC/CT pending  Vaginosis - positive for BV (patient discharged before result, Flagyl sent to pharmacy, will call patient in AM to advise and )  CBC wnl, 13/33/290  CMP collected - wnl  T&S - Patient is Rh negative - rhogam given in [facility]  Drug screen (verbal consent from patient) - THC presumptive positive  UA with 3+ glucose, culture pending  COVID negative    Patient desires PNC with resident clinic, message sent to clinic pool to set up appointment, patient to get full growth scan for accurate dating    Strict ED return precautions discussed with patient.  All questions answered.  Patient discharged in stable condition.      Mony Carlisle MD   OB/GYN PGY1  [facility]          Other:   I have discussed this case with another health care provider.       <> Summary of the Discussion: Dr. José            Attending Attestation:   Physician Attestation Statement for Resident:  As the supervising MD   Physician Attestation Statement: I have personally seen and examined this patient.   I agree with the above history. -:   As the supervising MD I agree with the above PE.    As the supervising MD I agree with the above treatment, course, plan, and disposition.   -: Patient evaluated and found to be stable, agree with  resident's assessment and plan.  NST reactive and reassuring, toco with uterine irritability.  Exam not c/w labor.  Sono with growth scan c/w 32 3/7 weeks.  PNL drawn and UDS +THC after verbal consent.  Agree with discharge to home, f/u with clinic arranged.  I was personally present during the critical portions of the procedure(s) performed by the resident and was immediately available in the ED to provide services and assistance as needed during the entire procedure.  I have reviewed the following: old records at this facility.                         Clinical Impression:   Final diagnoses:  [Z34.90] Pregnancy, unspecified gestational age  [O47.9] Uterine contractions during pregnancy (Primary)          ED Disposition Condition    Discharge Stable        ED Prescriptions     None        Follow-up Information    None          Ros José MD  08/25/22 2028

## 2022-08-22 NOTE — ED TRIAGE NOTES
"Michela No, a 26 y.o. female presents to the ED w/ complaint of 8.5-9 months pregnant, hasn't been doing prenantal care due to severe depression and anxiety. Pt has hx of borderline personality disorder. Pt reported that she started on treatment for mental health about 3-4 weeks ago.     Pt c/o lower back "wrap around pain", also c/o "lightning crotch", Pt is unsure if this is early labor pains. Pt also reports the baby isn't moving as much.     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    Herpes simplex virus (HSV) infection     Mood disorder 7/25/2013    PCOS (polycystic ovarian syndrome)     as per pt    Personality disorder 9/30/2012    Pseudoseizures 7/29/2013    Seizures 2010    Self-harming behavior     Substance abuse        "

## 2022-08-23 ENCOUNTER — TELEPHONE (OUTPATIENT)
Dept: OBSTETRICS AND GYNECOLOGY | Facility: CLINIC | Age: 27
End: 2022-08-23
Payer: MEDICAID

## 2022-08-23 LAB
HAV IGM SERPL QL IA: NEGATIVE
HBV CORE IGM SERPL QL IA: NEGATIVE
HBV SURFACE AG SERPL QL IA: NEGATIVE
HCV AB SERPL QL IA: NEGATIVE
HCV AB SERPL QL IA: NEGATIVE
HIV 1+2 AB+HIV1 P24 AG SERPL QL IA: NEGATIVE
RPR SER QL: NORMAL
RUBV IGG SER-ACNC: 10.7 IU/ML
RUBV IGG SER-IMP: REACTIVE

## 2022-08-23 RX ORDER — METRONIDAZOLE 500 MG/1
500 TABLET ORAL EVERY 12 HOURS
Qty: 14 TABLET | Refills: 0 | Status: SHIPPED | OUTPATIENT
Start: 2022-08-23 | End: 2022-08-24

## 2022-08-23 NOTE — DISCHARGE INSTRUCTIONS
Call Labor Unit anytime with questions or concerns at [phone]      Medications that are safe during pregnancy:  For pain/fever: Tylenol (acetaminophen)   For allergy symptoms (sneezing, watery eyes, itching): Benadryl or regular Claritin  For nasal congestion: regular Sudafed (do not take if you have high blood pressure.   If you have high blood pressure, you make take Coricidin HBP  For cough: regular Robitussin  For sleep: Benadryl       DANGER SIGNS DURING PREGNANCY    Sometimes problems happen during pregnancy. You need to come to the hospital right away if any of these things happen.  Abdominal pain that does not go away  Baby not moving at all or as much in late pregnancy (see kick counts section)  Blurred vision or spots before your eyes  Dizziness or fainting  Fever  Pain or burning when you urinate  Regular contractions  Severe or continuous headaches  Swelling of the hands of face  Vaginal bleeding  Vaginal blisters or sores  Vomiting  Water breaks or leaks (sudden or slow loss of fluid from the vagina)  PREMATURE BIRTHS  Most babies are born at term (40 weeks). Babies born before 37 weeks are considered premature (born too soon). Babies born too soon may have trouble breathing and staying warm. They need lots of special care.  PREMATURITY IS A SERIOUS RISK TO YOUR BABY.  It is VERY IMPORTANT to come to the hospital if you think your baby may be trying to come too early.  These are the signs of premature labor:  Labor pains or contractions  Low backache  Menstrual cramps  Stomach cramps (with or without diarrhea)  Pelvic pressure (feels like the baby is pushing down or trying to fall out)  Change in discharge from the vagina (sudden increase in discharge or becomes more watery or slightly bloody; panties stay wet or wet in the morning)  Vaginal bleeding or spotting        Pregnancy-safe Medications: (over-the-counter)    Nausea: Pepto Bismol, Emerol, Vitamin B-6 (25-50mg twice a day).  Constipation:  Fibercon, Dialose, Metamucil, Colace, Milk of Magnesia  Indigestion: Mylanta, Maalox, Gaviscon, Tums, Zantac ( mg once or twice a day)  Diarrhea: Pepto Bismol, Kaopectate, Imodium AD  Headache: Tylenol, Extra-strength Tylenol  Cold/Cough/Congestion: Tylenol Cold & Sinus, Robitussin DM (cough)    Aspirin and Ibuprofen should be AVOIDED.    If you are not sure about an over-the-counter medication, please call the Labor Unit at [phone]

## 2022-08-23 NOTE — TELEPHONE ENCOUNTER
----- Message from Mony Carlisle MD sent at 8/23/2022  4:03 AM CDT -----  Hi!     This patient was seen in the [facility] last night, has not had PNC this pregnancy and dating 32w3d. Prenatal labs collected in [facility]. Please schedule her to establish care in resident clinic in 1-2 weeks.     Thank you!   Mony Carlisle, PGY1

## 2022-08-24 ENCOUNTER — TELEPHONE (OUTPATIENT)
Dept: OBSTETRICS AND GYNECOLOGY | Facility: HOSPITAL | Age: 27
End: 2022-08-24
Payer: MEDICAID

## 2022-08-24 LAB
BACTERIA UR CULT: NO GROWTH
C TRACH DNA SPEC QL NAA+PROBE: NOT DETECTED
N GONORRHOEA DNA SPEC QL NAA+PROBE: NOT DETECTED

## 2022-08-24 RX ORDER — METRONIDAZOLE 500 MG/1
500 TABLET ORAL 2 TIMES DAILY
Qty: 14 TABLET | Refills: 0 | Status: SHIPPED | OUTPATIENT
Start: 2022-08-24 | End: 2022-09-09

## 2022-08-24 NOTE — TELEPHONE ENCOUNTER
Called patient and let her know Affirm was + for BV. Reordered flagyl for patient preferred pharmacy. Discussed that patient should not drink on flagyl. Pt verbalized understanding.     Priscilla Woody MD  OB-GYN, PGY-1      ----- Message from Ros José MD sent at 8/24/2022  8:46 AM CDT -----  Regarding: Affirm  Please call patient and treat for bacterial vaginosis.

## 2022-08-25 LAB — BACTERIA SPEC AEROBE CULT: NORMAL

## 2022-09-09 ENCOUNTER — INITIAL PRENATAL (OUTPATIENT)
Dept: OBSTETRICS AND GYNECOLOGY | Facility: CLINIC | Age: 27
End: 2022-09-09
Payer: MEDICAID

## 2022-09-09 ENCOUNTER — CLINICAL SUPPORT (OUTPATIENT)
Dept: OBSTETRICS AND GYNECOLOGY | Facility: CLINIC | Age: 27
End: 2022-09-09
Payer: MEDICAID

## 2022-09-09 VITALS — BODY MASS INDEX: 25.3 KG/M2 | SYSTOLIC BLOOD PRESSURE: 120 MMHG | WEIGHT: 156.75 LBS | DIASTOLIC BLOOD PRESSURE: 80 MMHG

## 2022-09-09 DIAGNOSIS — Z67.91 RH NEGATIVE STATUS DURING PREGNANCY IN THIRD TRIMESTER: ICD-10-CM

## 2022-09-09 DIAGNOSIS — B00.9 HERPES SIMPLEX VIRUS TYPE 2 (HSV-2) INFECTION AFFECTING PREGNANCY IN THIRD TRIMESTER: ICD-10-CM

## 2022-09-09 DIAGNOSIS — Z23 ENCOUNTER FOR IMMUNIZATION: ICD-10-CM

## 2022-09-09 DIAGNOSIS — Z23 NEED FOR TDAP VACCINATION: Primary | ICD-10-CM

## 2022-09-09 DIAGNOSIS — B96.89 BV (BACTERIAL VAGINOSIS): ICD-10-CM

## 2022-09-09 DIAGNOSIS — N76.0 BV (BACTERIAL VAGINOSIS): ICD-10-CM

## 2022-09-09 DIAGNOSIS — Z30.09 ENCOUNTER FOR OTHER GENERAL COUNSELING OR ADVICE ON CONTRACEPTION: ICD-10-CM

## 2022-09-09 DIAGNOSIS — O98.513 HERPES SIMPLEX VIRUS TYPE 2 (HSV-2) INFECTION AFFECTING PREGNANCY IN THIRD TRIMESTER: ICD-10-CM

## 2022-09-09 DIAGNOSIS — O26.893 RH NEGATIVE STATUS DURING PREGNANCY IN THIRD TRIMESTER: ICD-10-CM

## 2022-09-09 DIAGNOSIS — O09.33 LATE PRENATAL CARE AFFECTING PREGNANCY IN THIRD TRIMESTER: Primary | ICD-10-CM

## 2022-09-09 PROBLEM — O98.519 HERPES SIMPLEX TYPE 2 (HSV-2) INFECTION AFFECTING PREGNANCY, ANTEPARTUM: Status: ACTIVE | Noted: 2022-09-09

## 2022-09-09 PROBLEM — O26.899 RH NEGATIVE, ANTEPARTUM: Status: ACTIVE | Noted: 2022-09-09

## 2022-09-09 PROBLEM — O09.30 LATE PRENATAL CARE AFFECTING PREGNANCY, ANTEPARTUM: Status: ACTIVE | Noted: 2022-09-09

## 2022-09-09 PROBLEM — O99.340 MENTAL DISORDER AFFECTING PREGNANCY, ANTEPARTUM: Status: ACTIVE | Noted: 2022-09-09

## 2022-09-09 PROCEDURE — 99999 PR PBB SHADOW E&M-EST. PATIENT-LVL II: ICD-10-PCS | Mod: PBBFAC,,, | Performed by: OBSTETRICS & GYNECOLOGY

## 2022-09-09 PROCEDURE — 99214 OFFICE O/P EST MOD 30 MIN: CPT | Mod: TH,S$PBB,, | Performed by: OBSTETRICS & GYNECOLOGY

## 2022-09-09 PROCEDURE — 99212 OFFICE O/P EST SF 10 MIN: CPT | Mod: PBBFAC,TH,PN | Performed by: OBSTETRICS & GYNECOLOGY

## 2022-09-09 PROCEDURE — 99214 PR OFFICE/OUTPT VISIT, EST, LEVL IV, 30-39 MIN: ICD-10-PCS | Mod: TH,S$PBB,, | Performed by: OBSTETRICS & GYNECOLOGY

## 2022-09-09 PROCEDURE — 99999 PR PBB SHADOW E&M-EST. PATIENT-LVL II: CPT | Mod: PBBFAC,,, | Performed by: OBSTETRICS & GYNECOLOGY

## 2022-09-09 PROCEDURE — 90715 TDAP VACCINE 7 YRS/> IM: CPT | Mod: PBBFAC,PN

## 2022-09-09 RX ORDER — VALACYCLOVIR HYDROCHLORIDE 500 MG/1
500 TABLET, FILM COATED ORAL 2 TIMES DAILY
Qty: 60 TABLET | Refills: 0 | Status: SHIPPED | OUTPATIENT
Start: 2022-09-09 | End: 2022-10-09

## 2022-09-09 RX ORDER — METRONIDAZOLE 7.5 MG/G
1 GEL VAGINAL NIGHTLY
Qty: 70 G | Refills: 0 | Status: SHIPPED | OUTPATIENT
Start: 2022-09-09 | End: 2022-09-14

## 2022-09-09 NOTE — PROGRESS NOTES
"Pregnancy dating, labs, ultrasound reports, prenatal testing, and problem list; prior records and results; and available outside records were reviewed and updated in EMR.  Pertinent findings were noted below.    Reason for Visit   Initial Prenatal Visit    HPI   26 y.o., at 35w0d by Estimated Date of Delivery: 10/14/22    Overall doing well today. No contractions, vaginal bleeding, LOF. Good fetal movement.   Still having vaginal discharge, +BV on  in [facility], patient took Flagyl x4days and felt "very ill" from meds, stopped tx.   No N/V, headaches.     Exam   /80   Wt 71.1 kg (156 lb 12 oz)   LMP  (LMP Unknown)   BMI 25.30 kg/m²     GENERAL: No acute distress  ABD: Gravid    Fundal height 34cm  FHR 140s    Assessment and Plan   Late prenatal care affecting pregnancy in third trimester  -     US MFM Procedure (Viewpoint); Future    Rh negative status during pregnancy in third trimester    BV (bacterial vaginosis)  -     metroNIDAZOLE (METROGEL) 0.75 % (37.5mg/5 gram) vaginal gel; Place 1 applicator vaginally every evening. for 5 days  Dispense: 70 g; Refill: 0    Herpes simplex virus type 2 (HSV-2) infection affecting pregnancy in third trimester  -     valACYclovir (VALTREX) 500 MG tablet; Take 1 tablet (500 mg total) by mouth 2 (two) times daily.  Dispense: 60 tablet; Refill: 0    Encounter for immunization    Encounter for other general counseling or advice on contraception       Patient seen for first time in pregnancy in [facility] on  (see note for more info)   Labs collected in [facility] on , wnl   Patient received rhogam injection in [facility] on    Growth/dating scan scheduled, current dating by [facility] on    metrogel sent to pharmacy for BV (did not tolerate PO Flagyl)   Will start patient on ppx Valtrex d/t HSV hx, rx sent to pharmacy   Tdap today   Postpartum contraception options discussed with patient today, info given, patient interested in post-placental Mirena     labor " precautions given  Follow-up: 1 week    Mony Carlisle MD   OB/GYN PGY1  [facility]    I have reviewed the Resident's progress note.  I have personally interviewed and examined the patient at bedside and agree with the findings as documented.  All patient questions answered.  -- ANITA Werner M.D.

## 2022-09-13 ENCOUNTER — PATIENT MESSAGE (OUTPATIENT)
Dept: MATERNAL FETAL MEDICINE | Facility: CLINIC | Age: 27
End: 2022-09-13
Payer: MEDICAID

## 2022-09-14 ENCOUNTER — PROCEDURE VISIT (OUTPATIENT)
Dept: MATERNAL FETAL MEDICINE | Facility: CLINIC | Age: 27
End: 2022-09-14
Payer: MEDICAID

## 2022-09-14 DIAGNOSIS — Z36.2 ENCOUNTER FOR FOLLOW-UP ULTRASOUND OF FETAL ANATOMY: Primary | ICD-10-CM

## 2022-09-14 DIAGNOSIS — O09.33 LATE PRENATAL CARE AFFECTING PREGNANCY IN THIRD TRIMESTER: ICD-10-CM

## 2022-09-14 PROCEDURE — 76811 US MFM PROCEDURE (VIEWPOINT): ICD-10-PCS | Mod: 26,S$PBB,, | Performed by: OBSTETRICS & GYNECOLOGY

## 2022-09-14 PROCEDURE — 76811 OB US DETAILED SNGL FETUS: CPT | Mod: PBBFAC | Performed by: OBSTETRICS & GYNECOLOGY

## 2022-09-15 ENCOUNTER — TELEPHONE (OUTPATIENT)
Dept: MATERNAL FETAL MEDICINE | Facility: CLINIC | Age: 27
End: 2022-09-15
Payer: MEDICAID

## 2022-09-15 NOTE — TELEPHONE ENCOUNTER
Called pt to let her know that additional ultrasound was not needed per Dr Ordonez   pt verbalized understanding

## 2022-10-06 ENCOUNTER — HOSPITAL ENCOUNTER (EMERGENCY)
Facility: OTHER | Age: 27
Discharge: HOME | End: 2022-10-07
Payer: MEDICAID

## 2022-10-06 VITALS
SYSTOLIC BLOOD PRESSURE: 128 MMHG | OXYGEN SATURATION: 99 % | DIASTOLIC BLOOD PRESSURE: 78 MMHG | TEMPERATURE: 98 F | RESPIRATION RATE: 18 BRPM | HEART RATE: 81 BPM

## 2022-10-06 DIAGNOSIS — Z3A.38 38 WEEKS GESTATION OF PREGNANCY: ICD-10-CM

## 2022-10-06 DIAGNOSIS — O47.9 IRREGULAR CONTRACTIONS: Primary | ICD-10-CM

## 2022-10-06 PROCEDURE — 59025 FETAL NON-STRESS TEST: CPT

## 2022-10-06 PROCEDURE — 99284 EMERGENCY DEPT VISIT MOD MDM: CPT | Mod: 25

## 2022-10-07 PROCEDURE — 59025 PR FETAL 2N-STRESS TEST: ICD-10-PCS | Mod: 26,,, | Performed by: OBSTETRICS & GYNECOLOGY

## 2022-10-07 PROCEDURE — 59025 FETAL NON-STRESS TEST: CPT | Mod: 26,,, | Performed by: OBSTETRICS & GYNECOLOGY

## 2022-10-07 PROCEDURE — 99283 PR EMERGENCY DEPT VISIT,LEVEL III: ICD-10-PCS | Mod: 25,,, | Performed by: OBSTETRICS & GYNECOLOGY

## 2022-10-07 PROCEDURE — 99283 EMERGENCY DEPT VISIT LOW MDM: CPT | Mod: 25,,, | Performed by: OBSTETRICS & GYNECOLOGY

## 2022-10-07 NOTE — DISCHARGE INSTRUCTIONS
Keep previously scheduled clinic appointment  Return to L&D if you experience contractions every 2-5 minutes for two consecutive hours  Vaginal Bleeding  Decreased fetal movement  Leaking of fluid  Headache, dizziness or blurred vision  Temperature 100.4 or greater  Call the clinic ([phone]) during the day time or L&D ([phone]) after hours for any questions/concerns.  Drink 8-10 glasses of water a day

## 2022-10-10 ENCOUNTER — HOSPITAL ENCOUNTER (INPATIENT)
Facility: OTHER | Age: 27
LOS: 3 days | Discharge: HOME | End: 2022-10-13
Attending: OBSTETRICS & GYNECOLOGY | Admitting: OBSTETRICS & GYNECOLOGY
Payer: MEDICAID

## 2022-10-10 ENCOUNTER — ANESTHESIA (OUTPATIENT)
Dept: OBSTETRICS AND GYNECOLOGY | Facility: OTHER | Age: 27
End: 2022-10-10
Payer: MEDICAID

## 2022-10-10 ENCOUNTER — ANESTHESIA EVENT (OUTPATIENT)
Dept: OBSTETRICS AND GYNECOLOGY | Facility: OTHER | Age: 27
End: 2022-10-10
Payer: MEDICAID

## 2022-10-10 DIAGNOSIS — O47.9 UTERINE CONTRACTIONS DURING PREGNANCY: Primary | ICD-10-CM

## 2022-10-10 DIAGNOSIS — O36.8390 FETAL TACHYCARDIA AFFECTING MANAGEMENT OF MOTHER: ICD-10-CM

## 2022-10-10 PROBLEM — Z34.90 ENCOUNTER FOR INDUCTION OF LABOR: Status: ACTIVE | Noted: 2022-10-10

## 2022-10-10 LAB
ABO + RH BLD: ABNORMAL
ALBUMIN SERPL BCP-MCNC: 2.5 G/DL (ref 3.5–5.2)
ALP SERPL-CCNC: 221 U/L (ref 55–135)
ALT SERPL W/O P-5'-P-CCNC: 15 U/L (ref 10–44)
AMPHET+METHAMPHET UR QL: NEGATIVE
ANION GAP SERPL CALC-SCNC: 9 MMOL/L (ref 8–16)
AST SERPL-CCNC: 16 U/L (ref 10–40)
BARBITURATES UR QL SCN>200 NG/ML: NEGATIVE
BASOPHILS # BLD AUTO: 0.03 K/UL (ref 0–0.2)
BASOPHILS NFR BLD: 0.4 % (ref 0–1.9)
BENZODIAZ UR QL SCN>200 NG/ML: NEGATIVE
BILIRUB SERPL-MCNC: 0.6 MG/DL (ref 0.1–1)
BILIRUB SERPL-MCNC: NEGATIVE MG/DL
BLD GP AB SCN CELLS X3 SERPL QL: ABNORMAL
BLOOD GROUP ANTIBODIES SERPL: NORMAL
BLOOD URINE, POC: NEGATIVE
BUN SERPL-MCNC: 5 MG/DL (ref 6–20)
BZE UR QL SCN: NEGATIVE
CALCIUM SERPL-MCNC: 8.7 MG/DL (ref 8.7–10.5)
CANNABINOIDS UR QL SCN: ABNORMAL
CHLORIDE SERPL-SCNC: 105 MMOL/L (ref 95–110)
CO2 SERPL-SCNC: 20 MMOL/L (ref 23–29)
COLOR, POC UA: NORMAL
CREAT SERPL-MCNC: 0.5 MG/DL (ref 0.5–1.4)
CREAT UR-MCNC: 12.4 MG/DL (ref 15–325)
CREAT UR-MCNC: 17.2 MG/DL (ref 15–325)
DIFFERENTIAL METHOD: ABNORMAL
EOSINOPHIL # BLD AUTO: 0.1 K/UL (ref 0–0.5)
EOSINOPHIL NFR BLD: 0.7 % (ref 0–8)
ERYTHROCYTE [DISTWIDTH] IN BLOOD BY AUTOMATED COUNT: 13.6 % (ref 11.5–14.5)
EST. GFR  (NO RACE VARIABLE): >60 ML/MIN/1.73 M^2
ETHANOL UR-MCNC: <10 MG/DL
GLUCOSE SERPL-MCNC: 73 MG/DL (ref 70–110)
GLUCOSE UR QL STRIP: NORMAL
HCT VFR BLD AUTO: 29.8 % (ref 37–48.5)
HGB BLD-MCNC: 9.6 G/DL (ref 12–16)
IMM GRANULOCYTES # BLD AUTO: 0.04 K/UL (ref 0–0.04)
IMM GRANULOCYTES NFR BLD AUTO: 0.5 % (ref 0–0.5)
KETONES UR QL STRIP: NEGATIVE
LEUKOCYTE ESTERASE URINE, POC: NEGATIVE
LYMPHOCYTES # BLD AUTO: 1.1 K/UL (ref 1–4.8)
LYMPHOCYTES NFR BLD: 14.4 % (ref 18–48)
MCH RBC QN AUTO: 28.7 PG (ref 27–31)
MCHC RBC AUTO-ENTMCNC: 32.2 G/DL (ref 32–36)
MCV RBC AUTO: 89 FL (ref 82–98)
METHADONE UR QL SCN>300 NG/ML: NEGATIVE
MONOCYTES # BLD AUTO: 0.7 K/UL (ref 0.3–1)
MONOCYTES NFR BLD: 9 % (ref 4–15)
NEUTROPHILS # BLD AUTO: 5.7 K/UL (ref 1.8–7.7)
NEUTROPHILS NFR BLD: 75 % (ref 38–73)
NITRITE, POC UA: NEGATIVE
NRBC BLD-RTO: 0 /100 WBC
OPIATES UR QL SCN: NEGATIVE
PCP UR QL SCN>25 NG/ML: NEGATIVE
PH, POC UA: 7
PLATELET # BLD AUTO: 220 K/UL (ref 150–450)
PMV BLD AUTO: 9.9 FL (ref 9.2–12.9)
POTASSIUM SERPL-SCNC: 3.9 MMOL/L (ref 3.5–5.1)
PROT SERPL-MCNC: 6.1 G/DL (ref 6–8.4)
PROT UR-MCNC: <7 MG/DL (ref 0–15)
PROT/CREAT UR: NORMAL MG/G{CREAT} (ref 0–0.2)
PROTEIN, POC: NEGATIVE
RBC # BLD AUTO: 3.35 M/UL (ref 4–5.4)
SARS-COV-2 RDRP RESP QL NAA+PROBE: NEGATIVE
SODIUM SERPL-SCNC: 134 MMOL/L (ref 136–145)
SPECIFIC GRAVITY, POC UA: 1
TOXICOLOGY INFORMATION: ABNORMAL
UROBILINOGEN, POC UA: NORMAL
WBC # BLD AUTO: 7.58 K/UL (ref 3.9–12.7)

## 2022-10-10 PROCEDURE — 59025 FETAL NON-STRESS TEST: CPT

## 2022-10-10 PROCEDURE — 36415 COLL VENOUS BLD VENIPUNCTURE: CPT

## 2022-10-10 PROCEDURE — 81002 URINALYSIS NONAUTO W/O SCOPE: CPT

## 2022-10-10 PROCEDURE — 25000003 PHARM REV CODE 250: Performed by: STUDENT IN AN ORGANIZED HEALTH CARE EDUCATION/TRAINING PROGRAM

## 2022-10-10 PROCEDURE — 85025 COMPLETE CBC W/AUTO DIFF WBC: CPT | Performed by: STUDENT IN AN ORGANIZED HEALTH CARE EDUCATION/TRAINING PROGRAM

## 2022-10-10 PROCEDURE — 59409 PRA ETRICAL CARE,VAG DELIV ONLY: ICD-10-PCS | Mod: AA,,, | Performed by: ANESTHESIOLOGY

## 2022-10-10 PROCEDURE — U0002 COVID-19 LAB TEST NON-CDC: HCPCS

## 2022-10-10 PROCEDURE — C1751 CATH, INF, PER/CENT/MIDLINE: HCPCS | Performed by: ANESTHESIOLOGY

## 2022-10-10 PROCEDURE — 86850 RBC ANTIBODY SCREEN: CPT | Performed by: STUDENT IN AN ORGANIZED HEALTH CARE EDUCATION/TRAINING PROGRAM

## 2022-10-10 PROCEDURE — 11000001 HC ACUTE MED/SURG PRIVATE ROOM

## 2022-10-10 PROCEDURE — 59409 OBSTETRICAL CARE: CPT | Mod: AA,,, | Performed by: ANESTHESIOLOGY

## 2022-10-10 PROCEDURE — 59025 OBTAIN FETAL NONSTRESS TEST (NST): ICD-10-PCS | Mod: 26,,, | Performed by: OBSTETRICS & GYNECOLOGY

## 2022-10-10 PROCEDURE — 27200710 HC EPIDURAL INFUSION PUMP SET: Performed by: ANESTHESIOLOGY

## 2022-10-10 PROCEDURE — 80053 COMPREHEN METABOLIC PANEL: CPT

## 2022-10-10 PROCEDURE — 59025 FETAL NON-STRESS TEST: CPT | Mod: 26,,, | Performed by: OBSTETRICS & GYNECOLOGY

## 2022-10-10 PROCEDURE — 51702 INSERT TEMP BLADDER CATH: CPT

## 2022-10-10 PROCEDURE — 86870 RBC ANTIBODY IDENTIFICATION: CPT | Performed by: STUDENT IN AN ORGANIZED HEALTH CARE EDUCATION/TRAINING PROGRAM

## 2022-10-10 PROCEDURE — 63600175 PHARM REV CODE 636 W HCPCS

## 2022-10-10 PROCEDURE — 99285 EMERGENCY DEPT VISIT HI MDM: CPT | Mod: 25

## 2022-10-10 PROCEDURE — 80307 DRUG TEST PRSMV CHEM ANLYZR: CPT

## 2022-10-10 PROCEDURE — 82570 ASSAY OF URINE CREATININE: CPT

## 2022-10-10 PROCEDURE — 63600175 PHARM REV CODE 636 W HCPCS: Performed by: STUDENT IN AN ORGANIZED HEALTH CARE EDUCATION/TRAINING PROGRAM

## 2022-10-10 PROCEDURE — 62326 NJX INTERLAMINAR LMBR/SAC: CPT | Performed by: STUDENT IN AN ORGANIZED HEALTH CARE EDUCATION/TRAINING PROGRAM

## 2022-10-10 RX ORDER — DIPHENOXYLATE HYDROCHLORIDE AND ATROPINE SULFATE 2.5; .025 MG/1; MG/1
1 TABLET ORAL 4 TIMES DAILY PRN
Status: DISCONTINUED | OUTPATIENT
Start: 2022-10-10 | End: 2022-10-11

## 2022-10-10 RX ORDER — SODIUM CHLORIDE 9 MG/ML
INJECTION, SOLUTION INTRAVENOUS
Status: DISCONTINUED | OUTPATIENT
Start: 2022-10-10 | End: 2022-10-11

## 2022-10-10 RX ORDER — FENTANYL CITRATE 50 UG/ML
INJECTION, SOLUTION INTRAMUSCULAR; INTRAVENOUS
Status: COMPLETED
Start: 2022-10-10 | End: 2022-10-10

## 2022-10-10 RX ORDER — TRANEXAMIC ACID 100 MG/ML
1000 INJECTION, SOLUTION INTRAVENOUS ONCE AS NEEDED
Status: DISCONTINUED | OUTPATIENT
Start: 2022-10-10 | End: 2022-10-11

## 2022-10-10 RX ORDER — ONDANSETRON 8 MG/1
8 TABLET, ORALLY DISINTEGRATING ORAL EVERY 8 HOURS PRN
Status: DISCONTINUED | OUTPATIENT
Start: 2022-10-10 | End: 2022-10-11

## 2022-10-10 RX ORDER — FENTANYL CITRATE 50 UG/ML
INJECTION, SOLUTION INTRAMUSCULAR; INTRAVENOUS
Status: DISCONTINUED | OUTPATIENT
Start: 2022-10-10 | End: 2022-10-11

## 2022-10-10 RX ORDER — OXYTOCIN/RINGER'S LACTATE 30/500 ML
95 PLASTIC BAG, INJECTION (ML) INTRAVENOUS ONCE
Status: DISCONTINUED | OUTPATIENT
Start: 2022-10-10 | End: 2022-10-11

## 2022-10-10 RX ORDER — FAMOTIDINE 10 MG/ML
20 INJECTION INTRAVENOUS ONCE
Status: DISCONTINUED | OUTPATIENT
Start: 2022-10-10 | End: 2022-10-11

## 2022-10-10 RX ORDER — OXYTOCIN/RINGER'S LACTATE 30/500 ML
0-30 PLASTIC BAG, INJECTION (ML) INTRAVENOUS CONTINUOUS
Status: DISCONTINUED | OUTPATIENT
Start: 2022-10-10 | End: 2022-10-11

## 2022-10-10 RX ORDER — SODIUM CHLORIDE, SODIUM LACTATE, POTASSIUM CHLORIDE, CALCIUM CHLORIDE 600; 310; 30; 20 MG/100ML; MG/100ML; MG/100ML; MG/100ML
INJECTION, SOLUTION INTRAVENOUS CONTINUOUS
Status: DISCONTINUED | OUTPATIENT
Start: 2022-10-10 | End: 2022-10-11

## 2022-10-10 RX ORDER — OXYTOCIN/RINGER'S LACTATE 30/500 ML
334 PLASTIC BAG, INJECTION (ML) INTRAVENOUS ONCE
Status: DISCONTINUED | OUTPATIENT
Start: 2022-10-10 | End: 2022-10-11

## 2022-10-10 RX ORDER — SODIUM CITRATE AND CITRIC ACID MONOHYDRATE 334; 500 MG/5ML; MG/5ML
30 SOLUTION ORAL ONCE
Status: DISCONTINUED | OUTPATIENT
Start: 2022-10-10 | End: 2022-10-11

## 2022-10-10 RX ORDER — FENTANYL/BUPIVACAINE/NS/PF 2MCG/ML-.1
PLASTIC BAG, INJECTION (ML) INJECTION CONTINUOUS
Status: DISCONTINUED | OUTPATIENT
Start: 2022-10-10 | End: 2022-10-11

## 2022-10-10 RX ORDER — CARBOPROST TROMETHAMINE 250 UG/ML
250 INJECTION, SOLUTION INTRAMUSCULAR
Status: DISCONTINUED | OUTPATIENT
Start: 2022-10-10 | End: 2022-10-11

## 2022-10-10 RX ORDER — PROCHLORPERAZINE EDISYLATE 5 MG/ML
5 INJECTION INTRAMUSCULAR; INTRAVENOUS EVERY 6 HOURS PRN
Status: DISCONTINUED | OUTPATIENT
Start: 2022-10-10 | End: 2022-10-11

## 2022-10-10 RX ORDER — SIMETHICONE 80 MG
1 TABLET,CHEWABLE ORAL 4 TIMES DAILY PRN
Status: DISCONTINUED | OUTPATIENT
Start: 2022-10-10 | End: 2022-10-11

## 2022-10-10 RX ORDER — METHYLERGONOVINE MALEATE 0.2 MG/ML
200 INJECTION INTRAVENOUS
Status: DISCONTINUED | OUTPATIENT
Start: 2022-10-10 | End: 2022-10-11

## 2022-10-10 RX ORDER — LIDOCAINE HYDROCHLORIDE 10 MG/ML
10 INJECTION INFILTRATION; PERINEURAL ONCE AS NEEDED
Status: DISCONTINUED | OUTPATIENT
Start: 2022-10-10 | End: 2022-10-11

## 2022-10-10 RX ORDER — CALCIUM CARBONATE 200(500)MG
500 TABLET,CHEWABLE ORAL 3 TIMES DAILY PRN
Status: DISCONTINUED | OUTPATIENT
Start: 2022-10-10 | End: 2022-10-11

## 2022-10-10 RX ORDER — FENTANYL/BUPIVACAINE/NS/PF 2MCG/ML-.1
PLASTIC BAG, INJECTION (ML) INJECTION CONTINUOUS PRN
Status: DISCONTINUED | OUTPATIENT
Start: 2022-10-10 | End: 2022-10-11

## 2022-10-10 RX ORDER — LIDOCAINE HYDROCHLORIDE AND EPINEPHRINE 15; 5 MG/ML; UG/ML
INJECTION, SOLUTION EPIDURAL
Status: DISCONTINUED | OUTPATIENT
Start: 2022-10-10 | End: 2022-10-11

## 2022-10-10 RX ORDER — FENTANYL/BUPIVACAINE/NS/PF 2MCG/ML-.1
PLASTIC BAG, INJECTION (ML) INJECTION
Status: COMPLETED
Start: 2022-10-10 | End: 2022-10-10

## 2022-10-10 RX ORDER — MISOPROSTOL 200 UG/1
800 TABLET ORAL
Status: DISCONTINUED | OUTPATIENT
Start: 2022-10-10 | End: 2022-10-11

## 2022-10-10 RX ADMIN — Medication 4 MILLI-UNITS/MIN: at 05:10

## 2022-10-10 RX ADMIN — BUPIVACAINE HYDROCHLORIDE 5 ML: 2.5 INJECTION, SOLUTION EPIDURAL; INFILTRATION; INTRACAUDAL; PERINEURAL at 07:10

## 2022-10-10 RX ADMIN — SODIUM CHLORIDE, SODIUM LACTATE, POTASSIUM CHLORIDE, AND CALCIUM CHLORIDE: .6; .31; .03; .02 INJECTION, SOLUTION INTRAVENOUS at 05:10

## 2022-10-10 RX ADMIN — SODIUM CHLORIDE, SODIUM LACTATE, POTASSIUM CHLORIDE, AND CALCIUM CHLORIDE 1000 ML: .6; .31; .03; .02 INJECTION, SOLUTION INTRAVENOUS at 03:10

## 2022-10-10 RX ADMIN — Medication 10 ML/HR: at 07:10

## 2022-10-10 RX ADMIN — LIDOCAINE HYDROCHLORIDE,EPINEPHRINE BITARTRATE 3 ML: 15; .005 INJECTION, SOLUTION EPIDURAL; INFILTRATION; INTRACAUDAL; PERINEURAL at 07:10

## 2022-10-10 RX ADMIN — FENTANYL CITRATE 100 MCG: 50 INJECTION, SOLUTION INTRAMUSCULAR; INTRAVENOUS at 07:10

## 2022-10-10 RX ADMIN — ONDANSETRON 8 MG: 8 TABLET, ORALLY DISINTEGRATING ORAL at 09:10

## 2022-10-10 NOTE — ANESTHESIA PREPROCEDURE EVALUATION
[facility]  Anesthesia Pre-Operative Evaluation         Patient Name: Michela No  YOB: 1995  MRN: [id number]    10/10/2022      Michela No is a 26 y.o. female  @ 39w3d who presents c/o contractions. IUP is complicated by late PNC, HSV (denies active lesions, not on meds), PCOS, complicated psych hx (boderline personality disorder, mood disorder, depression/anxiety, ADHD, seizures vs. Pseudoseizures, self-harming behavior, substance abuse), cerebral palsy, headaches, allergies. Denies hx of HTN, asthma, DM, bleeding diathesis, anticoagulation use, spinal disorders, or prior spine surgery.    OB History    Para Term  AB Living   2 1 1     1   SAB IAB Ectopic Multiple Live Births         0 1      # Outcome Date GA Lbr Rivas/2nd Weight Sex Delivery Anes PTL Lv   2 Current            1 Term 21 38w3d 01:35 / 00:45 3.69 kg (8 lb 2.2 oz) M Vag-Spont EPI N KELLY      Obstetric Comments   pcos   H/o genital herpes   Pap 2018 NEG       Review of patient's allergies indicates:   Allergen Reactions    Fish containing products Swelling       Wt Readings from Last 1 Encounters:   22 0949 71.1 kg (156 lb 12 oz)       BP Readings from Last 3 Encounters:   10/10/22 136/60   10/06/22 128/78   22 120/80       Patient Active Problem List   Diagnosis    Personality disorder    Cerebral palsy    Mood disorder    Pseudoseizures    Spondylolysis    Spondylisthesis    Low back pain    Genital herpes    Polysubstance abuse    Borderline personality disorder    Late prenatal care affecting pregnancy, antepartum    Herpes simplex type 2 (HSV-2) infection affecting pregnancy, antepartum    Mental disorder affecting pregnancy, antepartum    Rh negative, antepartum    Fetal tachycardia affecting management of mother       Past Surgical History:   Procedure Laterality Date    FOOT TENDON SURGERY         Social History     Socioeconomic History     Marital status: Single   Tobacco Use    Smoking status: Former     Packs/day: 0.50     Years: 6.00     Pack years: 3.00     Types: Vaping w/o nicotine, Cigarettes    Smokeless tobacco: Never    Tobacco comments:     marijuana and cigarettes   Substance and Sexual Activity    Alcohol use: Not Currently     Comment: heavily    Drug use: Yes     Types: Methamphetamines, Marijuana     Comment: 4 months    Sexual activity: Yes     Partners: Male     Birth control/protection: None     Comment: condom during outbreak   Other Topics Concern    Patient feels they ought to cut down on drinking/drug use No    Patient annoyed by others criticizing their drinking/drug use No    Patient has felt bad or guilty about drinking/drug use No    Patient has had a drink/used drugs as an eye opener in the AM No   Social History Narrative    .She lives with both parents and younger brother. She works part-time over the week ends [place] as a .Michela is now working at [employer] full time.She is living with her miriam and 2 other friends in an apt. In the [facility].         Chemistry        Component Value Date/Time     08/22/2022 1553    K 4.0 08/22/2022 1553     08/22/2022 1553    CO2 23 08/22/2022 1553    BUN 4 (L) 08/22/2022 1553    CREATININE 0.6 08/22/2022 1553    GLU 80 08/22/2022 1553        Component Value Date/Time    CALCIUM 9.8 08/22/2022 1553    ALKPHOS 126 08/22/2022 1553    AST 15 08/22/2022 1553    ALT 13 08/22/2022 1553    BILITOT 0.6 08/22/2022 1553    ESTGFRAFRICA >60 02/23/2021 1520    EGFRNONAA >60 02/23/2021 1520            Lab Results   Component Value Date    WBC 10.85 08/22/2022    HGB 11.0 (L) 08/22/2022    HCT 33.3 (L) 08/22/2022    MCV 95 08/22/2022     08/22/2022       No results for input(s): PT, INR, PROTIME, APTT in the last 72 hours.        Pre-op Assessment    I have reviewed the Patient Summary Reports.     I have reviewed the Nursing Notes. I have  reviewed the NPO Status.   I have reviewed the Medications.     Review of Systems  Anesthesia Hx:  No problems with previous Anesthesia  Denies Family Hx of Anesthesia complications.   Denies Personal Hx of Anesthesia complications.   Social:  Non-Smoker    Hematology/Oncology:  Hematology Normal   Oncology Normal     EENT/Dental:EENT/Dental Normal   Cardiovascular:  Cardiovascular Normal     Pulmonary:  Pulmonary Normal    Renal/:  Renal/ Normal     Hepatic/GI:  Hepatic/GI Normal    Musculoskeletal:  Musculoskeletal Normal    Neurological:   Headaches Seizures    Endocrine:  Endocrine Normal    Psych:   Psychiatric History anxiety depression          Physical Exam  General: Well nourished, Cooperative, Alert and Oriented    Airway:  Mallampati: III / II  Mouth Opening: Normal  TM Distance: Normal  Tongue: Normal  Neck ROM: Normal ROM    Dental:  Intact        Anesthesia Plan  Type of Anesthesia, risks & benefits discussed:    Anesthesia Type: Gen ETT, Epidural, Spinal, CSE  Intra-op Monitoring Plan: Standard ASA Monitors  Post Op Pain Control Plan: multimodal analgesia and IV/PO Opioids PRN  Induction:  IV  Airway Plan: Direct and Video, Post-Induction  Informed Consent: Informed consent signed with the Patient and all parties understand the risks and agree with anesthesia plan.  All questions answered.   ASA Score: 2  Day of Surgery Review of History & Physical: H&P Update referred to the surgeon/provider.    Ready For Surgery From Anesthesia Perspective.     .

## 2022-10-10 NOTE — H&P
HISTORY AND PHYSICAL                                                OBSTETRICS          Subjective:       Michela No is a 26 y.o.  female with IUP at 39w3d weeks gestation who presents with contractions and persistent fetal tachycardia. Patient being admitted for IOL 2/2 persistent fetal tachycardia.     Patient reports contractions, denies vaginal bleeding, denies LOF.   Fetal Movement: normal.    This IUP is complicated by late PNC, HSV (denies active lesions, not on meds), PCOS, complicated psych hx (boderline personality disorder, mood disorder, depression/anxiety, ADHD, seizures vs. Pseudoseizures, self-harming behavior, substance abuse), cerebral palsy, headaches, Rh neg, allergies.    Review of Systems   Constitutional:  Negative for chills and fever.   Respiratory:  Negative for shortness of breath.    Cardiovascular:  Negative for chest pain.   Gastrointestinal:  Positive for abdominal pain (contractions). Negative for nausea and vomiting.   Endocrine: Negative for hot flashes.   Genitourinary:  Negative for vaginal bleeding.   Musculoskeletal:  Positive for back pain (contractions).   Integumentary:  Negative for breast mass, nipple discharge and breast skin changes.   Neurological:  Negative for headaches.   Hematological:  Does not bruise/bleed easily.   Psychiatric/Behavioral:  Negative for depression.    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    Herpes simplex virus (HSV) infection     Mood disorder 2013    PCOS (polycystic ovarian syndrome)     as per pt    Personality disorder 2012    Pseudoseizures 2013    Seizures      Self-harming behavior     Substance abuse        PSHx:   Past Surgical History:   Procedure Laterality Date    FOOT TENDON SURGERY         All:   Review of patient's allergies indicates:   Allergen Reactions    Fish containing products Swelling       Meds: (Not in a hospital admission)      SH:   Social History     Socioeconomic History    Marital status: Single   Tobacco Use    Smoking status: Former     Packs/day: 0.50     Years: 6.00     Pack years: 3.00     Types: Vaping w/o nicotine, Cigarettes    Smokeless tobacco: Never    Tobacco comments:     marijuana and cigarettes   Substance and Sexual Activity    Alcohol use: Not Currently     Comment: heavily    Drug use: Yes     Types: Methamphetamines, Marijuana     Comment: 4 months    Sexual activity: Yes     Partners: Male     Birth control/protection: None     Comment: condom during outbreak   Other Topics Concern    Patient feels they ought to cut down on drinking/drug use No    Patient annoyed by others criticizing their drinking/drug use No    Patient has felt bad or guilty about drinking/drug use No    Patient has had a drink/used drugs as an eye opener in the AM No   Social History Narrative    .She lives with both parents and younger brother. She works part-time over the week ends [place] as a .Michela is now working at [employer] full time.She is living with her fimiko and 2 other friends in an apt. In the [employer].       FH:   Family History   Adopted: Yes   Problem Relation Age of Onset    No Known Problems Mother     No Known Problems Father     Glaucoma Neg Hx        OBHx:   OB History    Para Term  AB Living   2 1 1 0 0 1   SAB IAB Ectopic Multiple Live Births   0 0 0 0 1      # Outcome Date GA Lbr Rivas/2nd Weight Sex Delivery Anes PTL Lv   2 Current            1 Term 21 38w3d 01:35 / 00:45 3.69 kg (8 lb 2.2 oz) M Vag-Spont EPI N KELLY      Name: FLACA CHANCE      Apgar1: 9  Apgar5: 9      Obstetric  Comments   pcos   H/o genital herpes   Pap 12/2018 NEG       Objective:       /60   Pulse 78   LMP  (LMP Unknown)   SpO2 99%     Vitals:    10/10/22 1548 10/10/22 1550 10/10/22 1603 10/10/22 1605   BP: (!) 146/113  136/60    Pulse: 74 85 81 78   SpO2:  100%  99%       General:   alert, appears stated age and cooperative, no apparent distress   HENT:  normocephalic, atraumatic   Eyes:  extraocular movements and conjunctivae normal   Neck:  supple, range of motion normal, no thyromegaly   Lungs:   no respiratory distress   Heart:   regular rate   Abdomen:  soft, non-tender, non-distended but gravid, no rebound or guarding    Extremities negative edema, negative erythema   FHT: 170, moderate BTBV, +accels, -decels;  Cat 2 (reassuring)                 TOCO: Q 10-15 minutes   Presentations: cephalic by ultrasound   Cervix:     Dilation: 3cm    Effacement: 60%    Station:  -3    Consistency: medium    Position: middle   Sterile Speculum Exam: Negative for HSV lesions    EFW by Leopold's: 7lb    Recent Growth Scan: 53%    Lab Review  Blood Type O NEG  GBBS: negative  Rubella: Immune  RPR: NR  HIV: negative  HepB: negative       Assessment:       39w3d weeks gestation with contractions and fetal tachycardia.     Active Hospital Problems    Diagnosis  POA    *Fetal tachycardia affecting management of mother [O36.8390]  Yes      Resolved Hospital Problems   No resolved problems to display.          Plan:   IOL 2/2 persistent fetal tachycardia (Category 2 tracing)   Risks, benefits, alternatives and possible complications have been discussed in detail with the patient.   - Consents signed and to chart  - Admit to Labor and Delivery unit   - Epidural per Anesthesia  - Draw CBC, T&S  - Notify Staff  - Recheck in 4 hrs or PRN  - EFW 53%    2. Psychiatric history  -Borderline personality disorder, depression, anxiety, substance abuse  -Not on any medications   -Will need 2 week post partum visit     3. HSV  -Negative  speculum exam  -Did not start Valtrex at 36 weeks    4. Cerebral palsy    -History of successful vaginal delivery   -No acute needs at this time  -Will continue to monitor     5. Rh negative  -Will need postpartum Rhogam workup      Post-Partum Hemorrhage risk - low      Swapna Oliver MD  [facility]   OBGYN PGY1

## 2022-10-10 NOTE — ED NOTES
Vape in patient hand during IV placement. Pt stated she has been using the vape during pregnancy and here in [facility]. RN educated pt against using vape during pregnancy and not allowed in hospital. Pt voiced understanding.

## 2022-10-10 NOTE — ED PROVIDER NOTES
Encounter Date: 10/10/2022       History     Chief Complaint   Patient presents with    Contractions     HPI  Michela No is a 26 y.o. [id number] at 39w3d presents complaining of contractions [id number] and increased groin/pelvic pain, lost mucous plug earlier today, unsure of LOF, no vaginal bleeding. Reports decreased fetal movement.    This IUP is complicated by late PNC, HSV (denies active lesions, not on meds), PCOS, complicated psych hx (boderline personality disorder, mood disorder, depression/anxiety, ADHD, seizures vs. Pseudoseizures, self-harming behavior, substance abuse), cerebral palsy, headaches, allergies.      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    Herpes simplex virus (HSV) infection     Mood disorder 2013    PCOS (polycystic ovarian syndrome)     as per pt    Personality disorder 2012    Pseudoseizures 2013    Seizures 2010    Self-harming behavior     Substance abuse      Past Surgical History:   Procedure Laterality Date    FOOT TENDON SURGERY       Family History   Adopted: Yes   Problem Relation Age of Onset    No Known Problems Mother     No Known Problems Father     Glaucoma Neg Hx      Social History     Tobacco Use    Smoking status: Former     Packs/day: 0.50     Years: 6.00     Pack years: 3.00     Types: Vaping w/o nicotine, Cigarettes    Smokeless tobacco: Never    Tobacco comments:     marijuana and cigarettes   Substance Use Topics    Alcohol use: Not Currently     Comment: heavily    Drug use: Yes     Types: Methamphetamines, Marijuana     Comment: 4 months     Review of Systems   Constitutional:  Negative  for chills and fever.   HENT:  Negative for sore throat.    Eyes:  Negative for visual disturbance.   Respiratory:  Negative for cough and shortness of breath.    Cardiovascular:  Negative for chest pain.   Gastrointestinal:  Positive for abdominal pain. Negative for diarrhea, nausea and vomiting.   Genitourinary:  Positive for pelvic pain and vaginal discharge. Negative for difficulty urinating and vaginal bleeding.   Musculoskeletal:  Positive for back pain.   Neurological:  Negative for dizziness and headaches.   Psychiatric/Behavioral:  Negative for confusion.      Physical Exam     Initial Vitals   BP Pulse Resp Temp SpO2   10/10/22 1402 10/10/22 1401 10/10/22 1743 10/10/22 1737 10/10/22 1402   130/80 98 18 97.5 °F (36.4 °C) 100 %      MAP       --                Physical Exam    Constitutional: She appears well-developed and well-nourished. She is not diaphoretic. No distress.   HENT:   Head: Normocephalic and atraumatic.   Eyes: EOM are normal.   Neck:   Normal range of motion.  Cardiovascular:  Normal rate.           Pulmonary/Chest: No respiratory distress.   Musculoskeletal:         General: Normal range of motion.      Cervical back: Normal range of motion.     Neurological: She is alert and oriented to person, place, and time.   Skin: Skin is warm and dry.   Psychiatric: She has a normal mood and affect. Her behavior is normal. Judgment and thought content normal.     OB LABOR EXAM:       Method: Sterile vaginal exam per MD and Sterile speculum exam per MD.   Vaginal Bleeding: none present.     Dilatation: 2.   Station: -3.   Effacement: 60%.   Amniotic Fluid Color: no fluid.     Comments: Vaginal discharge noted on speculum exam. Negative nitrizine, ferning, pooling.      ED Course   Obtain Fetal nonstress test (NST)    Date/Time: 10/10/2022 2:04 PM  Performed by: Mony Carlisle MD  Authorized by: Mony Carlisle MD     Nonstress Test:     Variability:  6-25 BPM    Decelerations:  None    Accelerations:   15 bpm    Baseline:  170    Contraction Frequency:  P05-61dcg  Labs Reviewed   TOXICOLOGY SCREEN, URINE, RANDOM (COMPLIANCE) - Abnormal; Notable for the following components:       Result Value    THC Presumptive Positive (*)     Creatinine, Urine 12.4 (*)     All other components within normal limits    Narrative:     Specimen Source->Urine   COMPREHENSIVE METABOLIC PANEL   RPR   HIV 1 / 2 ANTIBODY   HEPATITIS B SURFACE ANTIGEN   POCT URINALYSIS, DIPSTICK OR TABLET REAGENT, AUTOMATED, WITH MICROSCOP   SARS-COV-2 RDRP GENE          Imaging Results    None            Medications   lactated ringers bolus 1,000 mL (has no administration in time range)   lactated ringers bolus 500 mL (has no administration in time range)   lactated ringers infusion ( Intravenous Stopped 10/10/22 1731)   0.9%  NaCl infusion (has no administration in time range)   oxytocin 30 units in 500 mL lactated ringers infusion (non-titrating) (0 evens-units/min Intravenous Hold 10/10/22 1630)   oxytocin 30 units in 500 mL lactated ringers infusion (non-titrating) (0 evens-units/min Intravenous Hold 10/10/22 1700)   carboprost injection 250 mcg (has no administration in time range)   methylergonovine injection 200 mcg (has no administration in time range)   miSOPROStoL tablet 800 mcg (has no administration in time range)   diphenoxylate-atropine 2.5-0.025 mg per tablet 1 tablet (has no administration in time range)   tranexamic acid (CYKLOKAPRON) 1,000 mg in sodium chloride 0.9 % 100 mL (ready to mix system) (has no administration in time range)   ondansetron disintegrating tablet 8 mg (has no administration in time range)   prochlorperazine injection Soln 5 mg (has no administration in time range)   calcium carbonate 200 mg calcium (500 mg) chewable tablet 500 mg (has no administration in time range)   simethicone chewable tablet 80 mg (has no administration in time range)   LIDOcaine HCL 10 mg/ml (1%) injection 10 mL (has no administration in time  range)   oxytocin 30 units in 500 mL lactated ringers infusion (titrating) (8 evens-units/min Intravenous Verify Only 10/10/22 4136)   lactated ringers bolus 1,000 mL (1,000 mLs Intravenous New Bag 10/10/22 1535)     Medical Decision Making:   ED Management:  2h rule out labor  SVE @ 1400: 2/60/-3  Repeat SVE @ 1600: no change  SSE: negative for ROM    VSS, afebrile, normotensive  NST: baseline 170, moderate variability  TOCO with ctx q96-34gop    1L IVF bolus given  Urine dip negative    Patient found vaping in [facility], states it is synthetic nicotine. States she has also used delta-8 for past few days to help her sleep.   Patient verbally consented for Utox: Presumptive positive for THC    In setting of term pregnancy fetal tachycardia (category 2 tracing) unresolved with IVF bolus, patient to be admitted to L&D for IOL  Consents signed and to chart  Vtx on BSUS  Negative speculum exam for HSV lesions  Draw T&S and CBC            Attending Attestation:   Physician Attestation Statement for Resident:  As the supervising MD   Physician Attestation Statement: I have personally seen and examined this patient.   I agree with the above history.  -:   As the supervising MD I agree with the above PE.     As the supervising MD I agree with the above treatment, course, plan, and disposition.   I was personally present during the critical portions of the procedure(s) performed by the resident and was immediately available in the ED to provide services and assistance as needed during the entire procedure.                             Clinical Impression:   Final diagnoses:  [O47.9] Uterine contractions during pregnancy (Primary)  [P03.810] Fetal tachycardia before the onset of labor      ED Disposition Condition    Send to L&D              Swapna Oliver MD  [facility]   OBGYN PGY1       Swapna Oliver MD  Resident  10/10/22 5323

## 2022-10-11 PROBLEM — Z34.90 ENCOUNTER FOR INDUCTION OF LABOR: Status: RESOLVED | Noted: 2022-10-10 | Resolved: 2022-10-11

## 2022-10-11 PROBLEM — O36.8390 FETAL TACHYCARDIA AFFECTING MANAGEMENT OF MOTHER: Status: RESOLVED | Noted: 2022-10-10 | Resolved: 2022-10-11

## 2022-10-11 LAB
ABO + RH BLD: ABNORMAL
BASOPHILS # BLD AUTO: 0.04 K/UL (ref 0–0.2)
BASOPHILS NFR BLD: 0.3 % (ref 0–1.9)
BLD GP AB SCN CELLS X3 SERPL QL: ABNORMAL
DIFFERENTIAL METHOD: ABNORMAL
EOSINOPHIL # BLD AUTO: 0.1 K/UL (ref 0–0.5)
EOSINOPHIL NFR BLD: 0.5 % (ref 0–8)
ERYTHROCYTE [DISTWIDTH] IN BLOOD BY AUTOMATED COUNT: 13.4 % (ref 11.5–14.5)
FETAL CELL SCN BLD QL ROSETTE: NORMAL
HCT VFR BLD AUTO: 32.7 % (ref 37–48.5)
HGB BLD-MCNC: 10.6 G/DL (ref 12–16)
IMM GRANULOCYTES # BLD AUTO: 0.07 K/UL (ref 0–0.04)
IMM GRANULOCYTES NFR BLD AUTO: 0.5 % (ref 0–0.5)
LYMPHOCYTES # BLD AUTO: 1.1 K/UL (ref 1–4.8)
LYMPHOCYTES NFR BLD: 8.8 % (ref 18–48)
MCH RBC QN AUTO: 28.6 PG (ref 27–31)
MCHC RBC AUTO-ENTMCNC: 32.4 G/DL (ref 32–36)
MCV RBC AUTO: 88 FL (ref 82–98)
MONOCYTES # BLD AUTO: 1.1 K/UL (ref 0.3–1)
MONOCYTES NFR BLD: 8.4 % (ref 4–15)
NEUTROPHILS # BLD AUTO: 10.5 K/UL (ref 1.8–7.7)
NEUTROPHILS NFR BLD: 81.5 % (ref 38–73)
NRBC BLD-RTO: 0 /100 WBC
PLATELET # BLD AUTO: 201 K/UL (ref 150–450)
PMV BLD AUTO: 9.6 FL (ref 9.2–12.9)
RBC # BLD AUTO: 3.71 M/UL (ref 4–5.4)
WBC # BLD AUTO: 12.84 K/UL (ref 3.9–12.7)

## 2022-10-11 PROCEDURE — 58300 PR INSERT INTRAUTERINE DEVICE: ICD-10-PCS | Mod: 59,,, | Performed by: STUDENT IN AN ORGANIZED HEALTH CARE EDUCATION/TRAINING PROGRAM

## 2022-10-11 PROCEDURE — 25000003 PHARM REV CODE 250

## 2022-10-11 PROCEDURE — 59409 PR OBSTETRICAL CARE,VAG DELIV ONLY: ICD-10-PCS | Mod: GB,,, | Performed by: STUDENT IN AN ORGANIZED HEALTH CARE EDUCATION/TRAINING PROGRAM

## 2022-10-11 PROCEDURE — 72100002 HC LABOR CARE, 1ST 8 HOURS

## 2022-10-11 PROCEDURE — 59409 OBSTETRICAL CARE: CPT | Mod: GB,,, | Performed by: STUDENT IN AN ORGANIZED HEALTH CARE EDUCATION/TRAINING PROGRAM

## 2022-10-11 PROCEDURE — 72200005 HC VAGINAL DELIVERY LEVEL II

## 2022-10-11 PROCEDURE — 58300 INSERT INTRAUTERINE DEVICE: CPT | Mod: 59,,, | Performed by: STUDENT IN AN ORGANIZED HEALTH CARE EDUCATION/TRAINING PROGRAM

## 2022-10-11 PROCEDURE — 63600175 PHARM REV CODE 636 W HCPCS: Performed by: STUDENT IN AN ORGANIZED HEALTH CARE EDUCATION/TRAINING PROGRAM

## 2022-10-11 PROCEDURE — 36415 COLL VENOUS BLD VENIPUNCTURE: CPT

## 2022-10-11 PROCEDURE — 11000001 HC ACUTE MED/SURG PRIVATE ROOM

## 2022-10-11 PROCEDURE — 85025 COMPLETE CBC W/AUTO DIFF WBC: CPT

## 2022-10-11 PROCEDURE — 85461 HEMOGLOBIN FETAL: CPT

## 2022-10-11 PROCEDURE — 86901 BLOOD TYPING SEROLOGIC RH(D): CPT

## 2022-10-11 PROCEDURE — 58300 INSERT INTRAUTERINE DEVICE: CPT | Performed by: STUDENT IN AN ORGANIZED HEALTH CARE EDUCATION/TRAINING PROGRAM

## 2022-10-11 RX ORDER — DIPHENHYDRAMINE HYDROCHLORIDE 50 MG/ML
25 INJECTION INTRAMUSCULAR; INTRAVENOUS EVERY 4 HOURS PRN
Status: DISCONTINUED | OUTPATIENT
Start: 2022-10-11 | End: 2022-10-13 | Stop reason: HOSPADM

## 2022-10-11 RX ORDER — DIPHENHYDRAMINE HCL 25 MG
25 CAPSULE ORAL EVERY 4 HOURS PRN
Status: DISCONTINUED | OUTPATIENT
Start: 2022-10-11 | End: 2022-10-13 | Stop reason: HOSPADM

## 2022-10-11 RX ORDER — SODIUM CHLORIDE 0.9 % (FLUSH) 0.9 %
10 SYRINGE (ML) INJECTION
Status: DISCONTINUED | OUTPATIENT
Start: 2022-10-11 | End: 2022-10-13 | Stop reason: HOSPADM

## 2022-10-11 RX ORDER — HYDROCODONE BITARTRATE AND ACETAMINOPHEN 5; 325 MG/1; MG/1
1 TABLET ORAL EVERY 4 HOURS PRN
Status: DISCONTINUED | OUTPATIENT
Start: 2022-10-11 | End: 2022-10-13 | Stop reason: HOSPADM

## 2022-10-11 RX ORDER — DOCUSATE SODIUM 100 MG/1
200 CAPSULE, LIQUID FILLED ORAL 2 TIMES DAILY PRN
Status: DISCONTINUED | OUTPATIENT
Start: 2022-10-11 | End: 2022-10-13 | Stop reason: HOSPADM

## 2022-10-11 RX ORDER — IBUPROFEN 600 MG/1
600 TABLET ORAL EVERY 6 HOURS
Status: DISCONTINUED | OUTPATIENT
Start: 2022-10-11 | End: 2022-10-13 | Stop reason: HOSPADM

## 2022-10-11 RX ORDER — ACETAMINOPHEN 325 MG/1
650 TABLET ORAL EVERY 6 HOURS PRN
Status: DISCONTINUED | OUTPATIENT
Start: 2022-10-11 | End: 2022-10-13 | Stop reason: HOSPADM

## 2022-10-11 RX ORDER — PRENATAL WITH FERROUS FUM AND FOLIC ACID 3080; 920; 120; 400; 22; 1.84; 3; 20; 10; 1; 12; 200; 27; 25; 2 [IU]/1; [IU]/1; MG/1; [IU]/1; MG/1; MG/1; MG/1; MG/1; MG/1; MG/1; UG/1; MG/1; MG/1; MG/1; MG/1
1 TABLET ORAL DAILY
Status: DISCONTINUED | OUTPATIENT
Start: 2022-10-11 | End: 2022-10-13 | Stop reason: HOSPADM

## 2022-10-11 RX ORDER — PROCHLORPERAZINE EDISYLATE 5 MG/ML
5 INJECTION INTRAMUSCULAR; INTRAVENOUS EVERY 6 HOURS PRN
Status: DISCONTINUED | OUTPATIENT
Start: 2022-10-11 | End: 2022-10-13 | Stop reason: HOSPADM

## 2022-10-11 RX ORDER — SIMETHICONE 80 MG
1 TABLET,CHEWABLE ORAL EVERY 6 HOURS PRN
Status: DISCONTINUED | OUTPATIENT
Start: 2022-10-11 | End: 2022-10-13 | Stop reason: HOSPADM

## 2022-10-11 RX ORDER — ONDANSETRON 8 MG/1
8 TABLET, ORALLY DISINTEGRATING ORAL EVERY 8 HOURS PRN
Status: DISCONTINUED | OUTPATIENT
Start: 2022-10-11 | End: 2022-10-13 | Stop reason: HOSPADM

## 2022-10-11 RX ORDER — OXYTOCIN/RINGER'S LACTATE 30/500 ML
95 PLASTIC BAG, INJECTION (ML) INTRAVENOUS ONCE
Status: DISCONTINUED | OUTPATIENT
Start: 2022-10-11 | End: 2022-10-13 | Stop reason: HOSPADM

## 2022-10-11 RX ORDER — HYDROCODONE BITARTRATE AND ACETAMINOPHEN 10; 325 MG/1; MG/1
1 TABLET ORAL EVERY 4 HOURS PRN
Status: DISCONTINUED | OUTPATIENT
Start: 2022-10-11 | End: 2022-10-13 | Stop reason: HOSPADM

## 2022-10-11 RX ORDER — HYDROCORTISONE 25 MG/G
CREAM TOPICAL 3 TIMES DAILY PRN
Status: DISCONTINUED | OUTPATIENT
Start: 2022-10-11 | End: 2022-10-13 | Stop reason: HOSPADM

## 2022-10-11 RX ADMIN — HYDROCODONE BITARTRATE AND ACETAMINOPHEN 1 TABLET: 5; 325 TABLET ORAL at 07:10

## 2022-10-11 RX ADMIN — DIPHENHYDRAMINE HYDROCHLORIDE 25 MG: 25 CAPSULE ORAL at 10:10

## 2022-10-11 RX ADMIN — HYDROCODONE BITARTRATE AND ACETAMINOPHEN 1 TABLET: 5; 325 TABLET ORAL at 01:10

## 2022-10-11 RX ADMIN — LEVONORGESTREL 1 INTRA UTERINE DEVICE: 52 INTRAUTERINE DEVICE INTRAUTERINE at 12:10

## 2022-10-11 RX ADMIN — FENTANYL CITRATE 100 MCG: 50 INJECTION, SOLUTION INTRAMUSCULAR; INTRAVENOUS at 12:10

## 2022-10-11 RX ADMIN — DOCUSATE SODIUM 200 MG: 100 CAPSULE, LIQUID FILLED ORAL at 08:10

## 2022-10-11 RX ADMIN — HYDROCODONE BITARTRATE AND ACETAMINOPHEN 1 TABLET: 5; 325 TABLET ORAL at 06:10

## 2022-10-11 RX ADMIN — IBUPROFEN 600 MG: 600 TABLET ORAL at 12:10

## 2022-10-11 RX ADMIN — IBUPROFEN 600 MG: 600 TABLET ORAL at 05:10

## 2022-10-11 RX ADMIN — IBUPROFEN 600 MG: 600 TABLET ORAL at 06:10

## 2022-10-11 RX ADMIN — PRENATAL VIT W/ FE FUMARATE-FA TAB 27-0.8 MG 1 TABLET: 27-0.8 TAB at 08:10

## 2022-10-11 NOTE — ANESTHESIA PROCEDURE NOTES
Epidural    Patient location during procedure: OB   Reason for block: primary anesthetic   Reason for block: labor analgesia requested by patient and obstetrician  Diagnosis: IUP   Start time: 10/10/2022 7:14 PM  Timeout: 10/10/2022 7:14 PM  End time: 10/10/2022 7:27 PM  Surgery related to: Vaginal Delivery    Staffing  Performing Provider: Zay Kim MD  Authorizing Provider: Raymundo Sheehan MD        Preanesthetic Checklist  Completed: patient identified, IV checked, site marked, risks and benefits discussed, surgical consent, monitors and equipment checked, pre-op evaluation, timeout performed, anesthesia consent given, hand hygiene performed and patient being monitored  Preparation  Patient position: sitting  Prep: ChloraPrep  Patient monitoring: ECG, Pulse Ox and Blood Pressure  Reason for block: primary anesthetic   Epidural  Skin Anesthetic: lidocaine 1%  Skin Wheal: 3 mL  Administration type: continuous  Approach: midline  Interspace: L4-5    Injection technique: SUSAN air  Needle and Epidural Catheter  Needle type: Tuohy   Needle gauge: 17  Needle length: 3.5 inches  Needle insertion depth: 4 cm  Catheter type: Scoreoid  Catheter size: 19 G  Catheter at skin depth: 8 cm  Insertion Attempts: 1  Test dose: 3 mL of lidocaine 1.5% with Epi 1-to-200,000  Additional Documentation: incremental injection, negative aspiration for heme and CSF, no paresthesia on injection, no signs/symptoms of IV or SA injection, no significant pain on injection and no significant complaints from patient  Needle localization: anatomical landmarks  Medications:  Volume per aspiration: 5 mL  Time between aspirations: 5 minutes   Assessment  Ease of block: easy  Patient's tolerance of the procedure: comfortable throughout block and no complaints No inadvertent dural puncture with Tuohy.  Dural puncture performed with spinal needle.

## 2022-10-11 NOTE — PLAN OF CARE
Problem: Breastfeeding  Goal: Effective Breastfeeding  Outcome: Ongoing, Progressing     Problem: Adjustment to Role Transition (Postpartum Vaginal Delivery)  Goal: Successful Maternal Role Transition  Outcome: Ongoing, Progressing     Problem: Bleeding (Postpartum Vaginal Delivery)  Goal: Hemostasis  Outcome: Ongoing, Progressing     Problem: Pain (Postpartum Vaginal Delivery)  Goal: Acceptable Pain Control  Outcome: Ongoing, Progressing     Patient in no apparent distress. VSS. Ambulating with minimal assist and voiding without difficulty. Breastfeeding independently. Mild cramping, medicated. No acute events this shift. No additional needs at this time.

## 2022-10-11 NOTE — PROGRESS NOTES
LABOR PROGRESS NOTE    MD to bedside for cervical check. Complaints: None.  Temp:  [97.5 °F (36.4 °C)] 97.5 °F (36.4 °C)  Pulse:  [] 75  Resp:  [18] 18  SpO2:  [97 %-100 %] 99 %  BP: (120-148)/() 128/80    FHT: Category 1. Decel immediately after rupture for 1 min; spontaneously resolved. Mod/+acc/intermittent late  CTX: q 2 minutes     CVX at 12:23 AM   Dilation (cm): 3  Effacement (%): 70  Station: -3  Dose(units) Oxytocin: *12 evens-units/min     ASSESSMENT   27 y.o.  at 39w4d, active    TIMELINE  1400: 3/60/-3  2015: 3/60/-3, pit @ 8  1215: /-2, thick mec    PLAN  1. Labor management  -Continue continuous maternal/fetal monitoring.   -Recheck 2-4 hours or PRN  -Pitocin per orders    ANITA Duffy MD  OBGYN PGY-4

## 2022-10-11 NOTE — L&D DELIVERY NOTE
[facility] - Labor & Delivery  Vaginal Delivery   Operative Note    SUMMARY   With excellent maternal effort and after about 15 minutes of pushing, patient had a Normal spontaneous vaginal delivery of live infant. Delayed cord clamping was unable to be performed 2/2 meconium stained rupture of membranes. Cord was cut and clamped and infant was handed to NICU team. Cord blood and gases were obtained before delivery of placenta.   Infant delivered position ARAMIS over perineum.  Nuchal cord: Yes, cord reduced following delivery.    Spontaneous delivery of placenta and IV pitocin given noting good uterine tone. At this time, post-placenta liletta IUD was placed and was noted to be at the fundus. Lot and expiration date below. 1st degree laceration noted. It was repaired with 3-0 vicryl in normal fashion.     Staff and upper level resident present for entirety of delivery and repair. Patient tolerated delivery well. Sponge needle and lap counted correctly x2.    EBL 150cc  Lot [id number] Exp 10/25    Priscilla Woody MD  OB-GYN, PGY-1      Indications:  (spontaneous vaginal delivery)  Pregnancy complicated by:   Patient Active Problem List   Diagnosis    Personality disorder    Cerebral palsy    Mood disorder    Pseudoseizures    Spondylolysis    Spondylisthesis    Low back pain    Genital herpes    Polysubstance abuse    Borderline personality disorder    Late prenatal care affecting pregnancy, antepartum    Herpes simplex type 2 (HSV-2) infection affecting pregnancy, antepartum    Mental disorder affecting pregnancy, antepartum    Rh negative, antepartum     (spontaneous vaginal delivery)     Admitting GA: 39w4d    Delivery Information for Mao No    Birth information:  YOB: 2023   Time of birth: 12:39 AM   Sex: male   Head Delivery Date/Time: 10/11/2022 12:38 AM   Delivery type: Vaginal, Spontaneous   Gestational Age: 39w4d    Delivery Providers    Delivering clinician: Danielle Helms,  "MD   Provider Role    Albert Duffy MD Resident    Priscilla Woody MD Resident    Maria Luisa Gomez, RN Delivery Nurse    Violeta Connell, RN Registered Nurse    David Bach, RN Charge Nurse    Talita Mcleod, RN Charge Nurse    Shashi Gill, ST Surgical Tech              Measurements    Weight: 3360 g  Weight (lbs): 7 lb 6.5 oz  Length: 50.2 cm  Length (in): 19.75"  Head circumference: 34.3 cm  Chest circumference: 34.3 cm         Apgars    Living status: Living  Apgars:  1 min.:  5 min.:  10 min.:  15 min.:  20 min.:    Skin color:  1  1       Heart rate:  2  2       Reflex irritability:  2  2       Muscle tone:  2  2       Respiratory effort:  2  2       Total:  9  9       Apgars assigned by: NICU         Operative Delivery    Forceps attempted?: No  Vacuum extractor attempted?: No         Shoulder Dystocia    Shoulder dystocia present?: No           Presentation    Presentation: Vertex  Position: Left Occiput Anterior           Interventions/Resuscitation    Method: NICU Attended       Cord    Vessels: 3 vessels  Complications: Nuchal  Nuchal Intervention: reduced  Nuchal Cord Description: loose nuchal cord  Number of Loops: 2  Delayed Cord Clamping?: Yes  Cord Clamped Date/Time: 10/11/2022 12:40 AM  Cord Blood Disposition: Sent with Baby  Gases Sent?: No  Stem Cell Collection (by MD): No       Placenta    Placenta delivery date/time: 10/11/2022 0043  Placenta removal: Spontaneous  Placenta appearance: Intact  Placenta disposition: discarded           Labor Events:       labor: No     Labor Onset Date/Time:         Dilation Complete Date/Time:         Start Pushing Date/Time:         Start Pushing Date/Time:       Rupture Date/Time: 10/11/22  0015         Rupture type:            Fluid Amount:         Fluid Color: Meconium Thin        Fluid Odor:         Membrane Status: ARM (Artificial Rupture)                 steroids: None     Antibiotics given for GBS: No     Induction: oxytocin   "   Indications for induction:        Augmentation: amniotomy     Indications for augmentation:       Labor complications: None     Additional complications:          Cervical ripening:                     Delivery:      Episiotomy: None     Indication for Episiotomy:       Perineal Lacerations: 1st Repaired:  Yes   Periurethral Laceration: right Repaired:     Labial Laceration:   Repaired:     Sulcus Laceration:   Repaired:     Vaginal Laceration:   Repaired:     Cervical Laceration:   Repaired:     Repair suture:       Repair # of packets: 1     Last Value - EBL - Nursing (mL):       Sum - EBL - Nursing (mL): 0     Last Value - EBL - Anesthesia (mL):        Calculated QBL (mL): 150        Vaginal Sweep Performed: Yes     Surgicount Correct: Yes       Other providers:       Anesthesia    Method: Epidural          Details (if applicable):  Trial of Labor      Categorization:      Priority:     Indications for :     Incision Type:       Additional  information:  Forceps:    Vacuum:    Breech:    Observed anomalies    Other (Comments):       I was present for the entire procedure, and agree with the above resident's assessment of findings and description of procedure.  Danielle Helms M.D.

## 2022-10-11 NOTE — LACTATION NOTE
Breastfeeding basics reviewed, questions answered. Pt encouraged to feed the baby 8 or more times in 24hrs on cue until content.

## 2022-10-11 NOTE — PLAN OF CARE
VSS. Patient ambulating and voiding independently and without difficulty. Fundus firm without massage, midline, with light rubra noted. Pain controlled with PRN pain medications. Safety maintained, bed low and in a locked position. Significant other at bedside. Breastfeeding every 2-3 hours with full assistance and in response to infant cues. No further concerns at this time, will continue to monitor.

## 2022-10-11 NOTE — LACTATION NOTE
Latch assistance given. Baby placed in cross cradle to latch on the left breast. Pt helped to achieve a deep latch. Baby taking good pulls and tugs, few swallows noted. Baby self detached and pt switched the baby to the right breast, assistance given for the latch. Pt experiencing lots of uterine cramping, altho pt medicated per her nurse. Coached pt thru slow paced breathing and a warm pack placed on her lower back to help with discomfort. Pt encouraged to call for latch assistance thru the evening and night as needed.    10/11/22 1330   Maternal Assessment   Breast Shape Bilateral:;round   Breast Density Bilateral:;soft   Areola Bilateral:;elastic   Nipples Bilateral:;everted   Maternal Infant Feeding   Maternal Emotional State anxious;assist needed   Infant Positioning cross-cradle   Signs of Milk Transfer infant jaw motion present   Nipple Shape After Feeding, Right everted round   Latch Assistance yes   Community Referrals   Community Referrals outpatient lactation program;support group

## 2022-10-11 NOTE — PLAN OF CARE
SW met with patient at bedside. Patient was alert and oriented. Patient was asked if she had all essential baby items. Patient stated that she had all baby items. Patient was informed of Positive THC in urine. Patient  was also informed that DCFS would be called because of the positive results.   10/11/22 1221   Discharge Assessment   Assessment Type Discharge Planning Assessment   Confirmed/corrected address, phone number and insurance Yes   Confirmed Demographics Correct on Facesheet   Source of Information patient   Lives With child(valerie), dependent;significant other   Do you expect to return to your current living situation? Yes   Prior to hospitilization cognitive status: Alert/Oriented   Current cognitive status: Alert/Oriented   Walking or Climbing Stairs Difficulty none   Dressing/Bathing Difficulty none   Equipment Currently Used at Home none   Readmission within 30 days? No   Do you have prescription coverage? Yes   Who is going to help you get home at discharge? Kitty No   Are you on dialysis? No   Do you take coumadin? No   Discharge Plan A Home   Discharge Plan B Home   DME Needed Upon Discharge  none   Relationship/Environment   Name(s) of Who Lives With Patient Mike Chávez

## 2022-10-11 NOTE — PROGRESS NOTES
LABOR NOTE    Resident to bedside for cervical check   S:  Complaints: No.  Epidural working:  yes    O: /79   Pulse 69   Temp 97.5 °F (36.4 °C) (Oral)   Resp 18   LMP  (LMP Unknown)   SpO2 100%   Breastfeeding No     FHT: Baseline 125; -decels, - accels Cat 1 (reassuring)  CTX: q 3-4 minutes, pit @ 8  SVE: 3/60/-3    TIMELINE:   1400: 3/60/-3  2015: 3/60/-3, pit @ 8    PLAN:  Continue Close Maternal/Fetal Monitoring  Pitocin Augmentation per protocol  Recheck 2 hours or PRN  Consider AROM at next check   Pp IUD consents signed and Liletta IUD placed in patient's room     Priscilla Woody MD  OB-GYN, PGY-1

## 2022-10-12 PROCEDURE — 99231 SBSQ HOSP IP/OBS SF/LOW 25: CPT | Mod: TH,,, | Performed by: OBSTETRICS & GYNECOLOGY

## 2022-10-12 PROCEDURE — 99231 PR SUBSEQUENT HOSPITAL CARE,LEVL I: ICD-10-PCS | Mod: TH,,, | Performed by: OBSTETRICS & GYNECOLOGY

## 2022-10-12 PROCEDURE — 25000003 PHARM REV CODE 250

## 2022-10-12 PROCEDURE — 11000001 HC ACUTE MED/SURG PRIVATE ROOM

## 2022-10-12 RX ORDER — DOCUSATE SODIUM 100 MG/1
200 CAPSULE, LIQUID FILLED ORAL 2 TIMES DAILY PRN
Qty: 60 CAPSULE | Refills: 0 | Status: SHIPPED | OUTPATIENT
Start: 2022-10-12 | End: 2024-01-17

## 2022-10-12 RX ORDER — IBUPROFEN 600 MG/1
600 TABLET ORAL EVERY 6 HOURS PRN
Qty: 30 TABLET | Refills: 1 | Status: SHIPPED | OUTPATIENT
Start: 2022-10-12

## 2022-10-12 RX ORDER — ACETAMINOPHEN 325 MG/1
650 TABLET ORAL EVERY 6 HOURS PRN
Qty: 30 TABLET | Refills: 1 | Status: SHIPPED | OUTPATIENT
Start: 2022-10-12

## 2022-10-12 RX ADMIN — IBUPROFEN 600 MG: 600 TABLET ORAL at 05:10

## 2022-10-12 RX ADMIN — IBUPROFEN 600 MG: 600 TABLET ORAL at 12:10

## 2022-10-12 RX ADMIN — PRENATAL VIT W/ FE FUMARATE-FA TAB 27-0.8 MG 1 TABLET: 27-0.8 TAB at 08:10

## 2022-10-12 RX ADMIN — IBUPROFEN 600 MG: 600 TABLET ORAL at 06:10

## 2022-10-12 RX ADMIN — DOCUSATE SODIUM 200 MG: 100 CAPSULE, LIQUID FILLED ORAL at 08:10

## 2022-10-12 RX ADMIN — HYDROCODONE BITARTRATE AND ACETAMINOPHEN 1 TABLET: 5; 325 TABLET ORAL at 06:10

## 2022-10-12 RX ADMIN — HYDROCODONE BITARTRATE AND ACETAMINOPHEN 1 TABLET: 5; 325 TABLET ORAL at 08:10

## 2022-10-12 RX ADMIN — HYDROCODONE BITARTRATE AND ACETAMINOPHEN 1 TABLET: 5; 325 TABLET ORAL at 01:10

## 2022-10-12 NOTE — LACTATION NOTE
10/12/22 1613   Maternal Assessment   Breast Shape Bilateral:;round   Breast Density Bilateral:;soft;filling   Areola Bilateral:;elastic   Nipples Bilateral:;everted   Maternal Infant Feeding   Maternal Emotional State assist needed;anxious   Infant Positioning cross-cradle;clutch/football   Signs of Milk Transfer audible swallow;infant jaw motion present   Pain with Feeding no   Nipple Shape After Feeding, Left round   Nipple Shape After Feeding, Right round   Latch Assistance yes   Pt shown multiple position by request. Breast compression with stimulation utilized to keep baby actively feeding. Pt shared that she may be more comfortable pumping and bottle feeding. LC stressed the importance of stimulating breast at least eight times in twenty-four hours and encouraged Pt to obtain a hands free pumping bra.

## 2022-10-12 NOTE — ANESTHESIA POSTPROCEDURE EVALUATION
Anesthesia Post Evaluation    Patient: Michela No    Procedure(s) Performed: * No procedures listed *    Final Anesthesia Type: epidural      Patient location during evaluation: labor & delivery  Patient participation: Yes- Able to Participate  Level of consciousness: awake and alert, awake and oriented  Post-procedure vital signs: reviewed and stable  Pain management: adequate  Airway patency: patent  PRO mitigation strategies: Multimodal analgesia and Use of major conduction anesthesia (spinal/epidural) or peripheral nerve block  PONV status at discharge: No PONV  Anesthetic complications: no      Cardiovascular status: blood pressure returned to baseline and hemodynamically stable  Respiratory status: unassisted, spontaneous ventilation and room air  Hydration status: euvolemic  Follow-up not needed.          Vitals Value Taken Time   /57 10/11/22 2320   Temp 36.6 °C (97.8 °F) 10/11/22 2320   Pulse 76 10/11/22 2320   Resp 16 10/11/22 2320   SpO2 97 % 10/11/22 2320         No case tracking events are documented in the log.      Pain/Gentry Score: Pain Rating Prior to Med Admin: 4 (10/12/2022 12:08 AM)  Pain Rating Post Med Admin: 3 (10/11/2022  8:54 PM)

## 2022-10-12 NOTE — PROGRESS NOTES
POSTPARTUM PROGRESS NOTE    Subjective:     PPD/POD#: 1   Procedure:    EGA: 39w4d   N/V: No   F/C: No   Abd Pain: Mild, well-controlled with oral pain medication   Lochia: Mild   Voiding: Yes   Ambulating: Yes   Bowel fnc: Yes   Breastfeeding: Yes   Contraception: Immediate post-placental Liletta IUD placed   Circumcision: Needs to be consented.  Needs to be examined by OB attending.     Objective:      Temp:  [97.3 °F (36.3 °C)-98.5 °F (36.9 °C)] 97.8 °F (36.6 °C)  Pulse:  [63-87] 76  Resp:  [16-18] 16  SpO2:  [96 %-99 %] 97 %  BP: (115-150)/(57-76) 115/57    Lung: Normal respiratory effort   Abdomen: Soft, appropriately tender   Uterus: Firm, no fundal tenderness   Incision: N/A   : Deferred   Extremities: Bilateral trace edema     Lab Review    Recent Labs   Lab 10/10/22  2223   *   K 3.9      CO2 20*   BUN 5*   CREATININE 0.5   GLU 73   PROT 6.1   BILITOT 0.6   ALKPHOS 221*   ALT 15   AST 16       Recent Labs   Lab 10/10/22  1706 10/11/22  0601   WBC 7.58 12.84*   HGB 9.6* 10.6*   HCT 29.8* 32.7*   MCV 89 88    201         I/O    Intake/Output Summary (Last 24 hours) at 10/12/2022 0714  Last data filed at 10/11/2022 1200  Gross per 24 hour   Intake --   Output 2000 ml   Net -2000 ml        Assessment and Plan:   Postpartum care:  - Patient doing well.  - Continue routine management and advances.    gHTN  - BP as above  - asymptomatic  - preE labs as above  - UOP: adequate  - Mag: not indicated  - Hypertensive agent not indicated    Borderline personality disorder/ Hx of self harm   - Not on medications  - 2 week post partum visit    HSV2  - Not on medication  - Negative spec    Cerebral palsy  - No acute needs at this time  - Will continue to monitor     Rh neg  - Will need postpartum rhogam work up  - Baby: O+  - Rhogam prior to discharge       Swapna Oliver MD  [facility]   OBGYN PGY1

## 2022-10-13 VITALS
OXYGEN SATURATION: 100 % | TEMPERATURE: 99 F | HEART RATE: 77 BPM | SYSTOLIC BLOOD PRESSURE: 138 MMHG | DIASTOLIC BLOOD PRESSURE: 75 MMHG | RESPIRATION RATE: 18 BRPM

## 2022-10-13 LAB — INJECT RH IG VOL PATIENT: NORMAL ML

## 2022-10-13 PROCEDURE — 63600519 RHOGAM PHARM REV CODE 636 ALT 250 W HCPCS

## 2022-10-13 PROCEDURE — 99238 HOSP IP/OBS DSCHRG MGMT 30/<: CPT | Mod: TH,,, | Performed by: OBSTETRICS & GYNECOLOGY

## 2022-10-13 PROCEDURE — 25000003 PHARM REV CODE 250

## 2022-10-13 PROCEDURE — 99238 PR HOSPITAL DISCHARGE DAY,<30 MIN: ICD-10-PCS | Mod: TH,,, | Performed by: OBSTETRICS & GYNECOLOGY

## 2022-10-13 RX ADMIN — IBUPROFEN 600 MG: 600 TABLET ORAL at 01:10

## 2022-10-13 RX ADMIN — HUMAN RHO(D) IMMUNE GLOBULIN 300 MCG: 300 INJECTION, SOLUTION INTRAMUSCULAR at 11:10

## 2022-10-13 RX ADMIN — PRENATAL VIT W/ FE FUMARATE-FA TAB 27-0.8 MG 1 TABLET: 27-0.8 TAB at 08:10

## 2022-10-13 RX ADMIN — HYDROCODONE BITARTRATE AND ACETAMINOPHEN 1 TABLET: 5; 325 TABLET ORAL at 11:10

## 2022-10-13 RX ADMIN — IBUPROFEN 600 MG: 600 TABLET ORAL at 11:10

## 2022-10-13 RX ADMIN — HYDROCODONE BITARTRATE AND ACETAMINOPHEN 1 TABLET: 5; 325 TABLET ORAL at 01:10

## 2022-10-13 NOTE — PROGRESS NOTES
POSTPARTUM PROGRESS NOTE    Subjective:     PPD/POD#: 2   Procedure:    EGA: 39w4d   N/V: No   F/C: No   Abd Pain: Mild, well-controlled with oral pain medication   Lochia: Mild   Voiding: Yes   Ambulating: Yes   Bowel fnc: Yes   Breastfeeding: Yes   Contraception: Immediate post-placental Liletta IUD placed   Circumcision: Done     Objective:      Temp:  [97.9 °F (36.6 °C)-99.3 °F (37.4 °C)] 99.3 °F (37.4 °C)  Pulse:  [74-77] 77  Resp:  [16-18] 18  SpO2:  [96 %-100 %] 100 %  BP: (129-143)/(67-75) 138/75    Lung: Normal respiratory effort   Abdomen: Soft, appropriately tender   Uterus: Firm, no fundal tenderness   Incision: N/A   : Deferred   Extremities: Bilateral trace edema     Lab Review    Recent Labs   Lab 10/10/22  2223   *   K 3.9      CO2 20*   BUN 5*   CREATININE 0.5   GLU 73   PROT 6.1   BILITOT 0.6   ALKPHOS 221*   ALT 15   AST 16         Recent Labs   Lab 10/10/22  1706 10/11/22  0601   WBC 7.58 12.84*   HGB 9.6* 10.6*   HCT 29.8* 32.7*   MCV 89 88    201           I/O  No intake or output data in the 24 hours ending 10/13/22 0848       Assessment and Plan:   Postpartum care:  - Patient doing well.  - Continue routine management and advances.    gHTN  - BP as above  - asymptomatic  - preE labs as above  - UOP: adequate  - Mag: not indicated  - Hypertensive agent not indicated    Borderline personality disorder/ Hx of self harm   - Not on medications  - 2 week post partum visit    HSV2  - Not on medication  - Negative spec    Cerebral palsy  - No acute needs at this time  - Will continue to monitor     Rh neg  - Will need postpartum rhogam work up  - Baby: O+  - Rhogam prior to discharge       Swapna Oliver MD  [facility]   OBGYN PGY1

## 2022-10-13 NOTE — DISCHARGE SUMMARY
Delivery Discharge Summary  Obstetrics      Primary OB Clinician: ANITA Werner MD      Admission date: 10/10/2022  Discharge date: 10/13/2022    Disposition: To home, self care    Discharge Diagnosis List:      Patient Active Problem List   Diagnosis    Personality disorder    Cerebral palsy    Mood disorder    Pseudoseizures    Spondylolysis    Spondylisthesis    Low back pain    Genital herpes    Polysubstance abuse    Borderline personality disorder    Late prenatal care affecting pregnancy, antepartum    Herpes simplex type 2 (HSV-2) infection affecting pregnancy, antepartum    Mental disorder affecting pregnancy, antepartum    Rh negative, antepartum     (spontaneous vaginal delivery)       Procedure:     Hospital Course:  Michela No is a 27 y.o. now , PPD #2 who was admitted on 10/10/2022 at 39w3d for IOL secondary to persistent fetal tachycardia at term. Patient was subsequently admitted to labor and delivery unit with signed consents.     Labor course was complicated by the development of gestational hypertension, however no qualifications for severe features. Delivery itself was uncomplicated.       Please see delivery note for further details. Her postpartum course was uncomplicated & she did not require initiation of anti-hypertensive medications. On discharge day, patient's pain is controlled with oral pain medications. Pt is tolerating ambulation without SOB or CP, and regular diet without N/V. Reports lochia is mild. Denies any HA, vision changes, F/C, LE swelling. Denies any breast pain/soreness.    Pt in stable condition and ready for discharge. She has been instructed to start and/or continue medications and follow up with her obstetrics provider as listed below.    Pertinent studies:  CBC  Recent Labs   Lab 10/10/22  1706 10/11/22  0601   WBC 7.58 12.84*   HGB 9.6* 10.6*   HCT 29.8* 32.7*   MCV 89 88    201          Immunization History   Administered Date(s)  Administered    DTaP 1994, 02/11/1995, 04/04/1995, 11/03/1995, 10/13/1999    HIB 1994, 02/11/1995, 04/04/1995, 11/03/1995    HPV 9-Valent 08/23/2018    Hepatitis B, Pediatric/Adolescent 1994, 1994, 04/04/1995    IPV 1994, 02/11/1995, 04/04/1995, 10/13/1999    Influenza 11/20/2001, 11/01/2002, 01/19/2012    Influenza - Quadrivalent - PF *Preferred* (6 months and older) 12/30/2015    Influenza Split 01/19/2012    MMR 11/03/1995, 10/13/1999    Meningococcal Conjugate (MCV4P) 02/04/2010    PPD Test 11/03/1995    Rho (D) Immune Globulin - IM 01/29/2021, 02/20/2021, 08/22/2022    Tdap 04/13/2006, 01/29/2021, 01/29/2021, 09/09/2022    Varicella 05/13/1997, 02/04/2010        Delivery:    Episiotomy: None   Lacerations: 1st   Repair suture:     Repair # of packets: 1   Blood loss (ml):       Birth information:  YOB: 2023   Time of birth: 12:39 AM   Sex: male   Delivery type: Vaginal, Spontaneous   Gestational Age: 39w4d    Delivery Clinician:      Other providers:       Additional  information:  Forceps:    Vacuum:    Breech:    Observed anomalies      Living?:           APGARS  One minute Five minutes Ten minutes   Skin color:         Heart rate:         Grimace:         Muscle tone:         Breathing:         Totals: 9  9        Placenta: Delivered:       appearance    Patient Instructions:   Current Discharge Medication List        START taking these medications    Details   acetaminophen (TYLENOL) 325 MG tablet Take 2 tablets (650 mg total) by mouth every 6 (six) hours as needed for Pain.  Qty: 30 tablet, Refills: 1      docusate sodium (COLACE) 100 MG capsule Take 2 capsules (200 mg total) by mouth 2 (two) times daily as needed for Constipation.  Qty: 60 capsule, Refills: 0      ibuprofen (ADVIL,MOTRIN) 600 MG tablet Take 1 tablet (600 mg total) by mouth every 6 (six) hours as needed for Pain.  Qty: 30 tablet, Refills: 1           CONTINUE these medications which have NOT  CHANGED    Details   PNV,calcium 72-iron-folic acid (PRENATAL VITAMIN PLUS LOW IRON) 27 mg iron- 1 mg Tab Take one tablet daily.  Prescribe prenatal covered by insurance  Qty: 90 tablet, Refills: 11    Associated Diagnoses: Supervision of normal first pregnancy in first trimester      valACYclovir (VALTREX) 500 MG tablet Take 1 tablet (500 mg total) by mouth 2 (two) times daily.  Qty: 60 tablet, Refills: 0    Associated Diagnoses: Herpes simplex virus type 2 (HSV-2) infection affecting pregnancy in third trimester             Discharge Procedure Orders   BREAST PUMP FOR HOME USE     Order Specific Question Answer Comments   Type of pump: Electric    Weight: 156    Length of need (1-99 months): 99      Diet Adult Regular     Lifting restrictions   Order Comments: No lifting more than infant weight for 6 weeks.     No driving until:   Order Comments: Not taking narcotic medicine and feel comfortable stepping on the brakes.     Pelvic Rest   Order Comments: Nothing in vagina including intercourse for 6 weeks.     Notify your health care provider if you experience any of the following:  temperature >100.4     Notify your health care provider if you experience any of the following:  persistent nausea and vomiting or diarrhea     Notify your health care provider if you experience any of the following:  severe uncontrolled pain     Notify your health care provider if you experience any of the following:  redness, tenderness, or signs of infection (pain, swelling, redness, odor or green/yellow discharge around incision site)     Notify your health care provider if you experience any of the following:  severe persistent headache     Notify your health care provider if you experience any of the following:  persistent dizziness, light-headedness, or visual disturbances     Notify your health care provider if you experience any of the following:  increased confusion or weakness     Notify your health care provider if you  experience any of the following:   Order Comments: Heavy vaginal bleeding, saturating more than 2 pads in 1 hour.     Activity as tolerated        Follow-up Information       J Gregorio Werner MD Follow up in 1 week(s).    Specialties: Obstetrics and Gynecology, Obstetrics, Gynecology  Why: 1 week BP & mood check  Contact information:  [address] [id number] [phone]                              Unique Bernardo M.D.  OB/GYN PGY-4

## 2022-10-13 NOTE — PROGRESS NOTES
POSTPARTUM PROGRESS NOTE    Subjective:     PPD/POD#: 2   Procedure:    EGA: 39w4d   N/V: No   F/C: No   Abd Pain: Mild, well-controlled with oral pain medication   Lochia: Mild   Voiding: Yes   Ambulating: Yes   Bowel fnc: Yes   Breastfeeding: Yes   Contraception: Immediate post-placental Liletta IUD placed   Circumcision: Done     Objective:      Temp:  [97.9 °F (36.6 °C)-98.5 °F (36.9 °C)] 97.9 °F (36.6 °C)  Pulse:  [74-78] 77  Resp:  [16-18] 18  SpO2:  [96 %-98 %] 96 %  BP: (129-143)/(67-77) 129/67    Lung: Normal respiratory effort   Abdomen: Soft, appropriately tender   Uterus: Firm, no fundal tenderness   Incision: N/A   : Deferred   Extremities: Bilateral trace edema     Lab Review    Recent Labs   Lab 10/10/22  2223   *   K 3.9      CO2 20*   BUN 5*   CREATININE 0.5   GLU 73   PROT 6.1   BILITOT 0.6   ALKPHOS 221*   ALT 15   AST 16         Recent Labs   Lab 10/10/22  1706 10/11/22  0601   WBC 7.58 12.84*   HGB 9.6* 10.6*   HCT 29.8* 32.7*   MCV 89 88    201           I/O  No intake or output data in the 24 hours ending 10/13/22 0709       Assessment and Plan:   Postpartum care:  - Patient doing well.  - Continue routine management and advances.    gHTN  - BP as above  - asymptomatic  - preE labs as above  - UOP: adequate  - Mag: not indicated  - Hypertensive agent not indicated    Borderline personality disorder/ Hx of self harm   - Not on medications  - 2 week post partum visit    HSV2  - Not on medication  - Negative spec    Cerebral palsy  - No acute needs at this time  - Will continue to monitor     Rh neg  - Will need postpartum rhogam work up  - Baby: O+  - Rhogam prior to discharge       Swapna Oliver MD  [facility]   OBGYN PGY1

## 2022-10-13 NOTE — PROGRESS NOTES
Depression education given with handout, pt states understanding and will follow up with OB in clinic for a mood check in 2 weeks

## 2022-10-13 NOTE — DISCHARGE INSTRUCTIONS
Discharge Instructions    The AAP recommends exclusive breastfeeding for about the first 6 months of age and as solid foods are introduced, continued breastfeeding  for at least 1 year or longer.    Feed the baby at the earliest sign of hunger or comfort (see signs on the back cover of the Mother's Breastfeeding Guide)  Hands to mouth, sucking motions  Rooting or searching for something to suck on  Dont wait for crying - it is a sign of distress    The feedings may be 8-12 times per 24hrs and will not follow a schedule  Avoid pacifiers and bottles for the first 4 weeks  Alternate the breast you start the feeding with, or start with the breast that feels the fullest  Switch breasts when the baby takes himself off the breast or falls asleep  Keep offering breasts until the baby looks full, no longer gives hunger signs, and stays asleep when placed on his back in the crib  If the baby is sleepy and wont wake for a feeding, put the baby skin-to-skin dressed in a diaper against the mothers bare chest  Sleep with your baby in your room near you  The baby should be positioned and latched on to the breast correctly  Chest-to-chest, chin in the breast  Babys lips are flipped outward  Babys mouth is stretched open wide like a shout  Babys sucking should feel like tugging to the mother  The baby should be drinking at the breast:  You should hear swallowing or gulping throughout the feeding  You should see milk on the babys lips when he comes off the breast  Your breasts should be softer when the baby is finished feeding  The baby should look relaxed at the end of feedings  After the 4th day and your milk is in:  The babys poop should turn bright yellow and be loose, watery, and seedy  The baby should have at least 3-4 poops the size of the palm of your hand per day  The baby should have at least 5-6 wet diapers per day  The urine should be light yellow in color  You should drink when you are thirsty and eat a  healthy diet when you are    hungry.     Take naps to get the rest you need.   Take medications and/or drink alcohol only with permission of your obstetrician    or the babys pediatrician.  You can also call the Infant Risk Center,   ([phone]), Monday-Friday, 8am-5pm Central time, to get the most   up-to-date evidence-based information on the use of medications during   pregnancy and breastfeeding.      Complete the First Alert form in the Mother's Breastfeeding Guide 3-5 days after the baby's birth.  Please call the Breastfeeding Warmline ([phone]) or the baby's pediatrician if you have any concerns.    The baby should be examined by a pediatrician at 3-5 days of age and again at 2 weeks of age.  Once your milk production increases, the baby should be gaining at least ½ - 1oz each day and should be back to birthweight no later than 10-14 days of age.  If this is not the case, please call the Breastfeeding Warmline (042-701-8119) for assistance and support.    Community Resources    [facility] Breastfeeding Warmline: [phone]   Local [facility] clinics: provide incentives and breastpumps to eligible mothers 0-198-264-BABY  La Leche League International (LLLI):  mother-to-mother support group website        www.lll.[employer]  [facility] La Leche League mother-to-mother support groups:        www.lllYunait.[employer]        La Leche League [facility]   Dr. James Cifuentes website for latch videos and general information:        www.breastfeedinginc.ca  Infant Risk Center is a call center that provides information about the safety of taking medications while breastfeeding.  Call 3-185-969-1181, M-F, 8am-5pm, [place].  International Lactation Consultant Association provides resources for assistance:        www.ilca.org  [place] Breastfeeding Coalition provides informationand resources for parents  and the community    www.LaBreastfeedingSupport.org  Kimmy Alba is a mom-to-mom support group:                              www.nolanesting.com//breastfeedng-support/  [employer] Healthy Babies:  3-351-819-BABY(8194)  [employer]: a breastfeeding support group for women of color, 623.261.3193

## 2022-10-13 NOTE — PLAN OF CARE
VSS throughout shift. Patient denies headache, dizziness, vision changes, and RUQ pain. Pt ambulating and voiding without difficulty. Patient safety maintained, side rails up, bed low and locked position. Pain well controlled with scheduled and PRN pain medication. Fundus midline and firm with light lochia. No additional needs at this time.

## 2022-10-14 ENCOUNTER — PATIENT MESSAGE (OUTPATIENT)
Dept: OBSTETRICS AND GYNECOLOGY | Facility: OTHER | Age: 27
End: 2022-10-14
Payer: MEDICAID

## 2022-10-20 ENCOUNTER — PATIENT MESSAGE (OUTPATIENT)
Dept: OBSTETRICS AND GYNECOLOGY | Facility: CLINIC | Age: 27
End: 2022-10-20
Payer: MEDICAID

## 2022-11-01 ENCOUNTER — PATIENT MESSAGE (OUTPATIENT)
Dept: OBSTETRICS AND GYNECOLOGY | Facility: CLINIC | Age: 27
End: 2022-11-01
Payer: MEDICAID

## 2023-04-07 ENCOUNTER — HOSPITAL ENCOUNTER (EMERGENCY)
Facility: OTHER | Age: 28
Discharge: HOME | End: 2023-04-07
Attending: EMERGENCY MEDICINE
Payer: MEDICAID

## 2023-04-07 VITALS
SYSTOLIC BLOOD PRESSURE: 109 MMHG | OXYGEN SATURATION: 99 % | RESPIRATION RATE: 16 BRPM | DIASTOLIC BLOOD PRESSURE: 69 MMHG | TEMPERATURE: 98 F | HEART RATE: 67 BPM

## 2023-04-07 DIAGNOSIS — B34.9 ACUTE VIRAL SYNDROME: Primary | ICD-10-CM

## 2023-04-07 DIAGNOSIS — N39.0 URINARY TRACT INFECTION WITHOUT HEMATURIA, SITE UNSPECIFIED: ICD-10-CM

## 2023-04-07 LAB
AMPHET+METHAMPHET UR QL: ABNORMAL
ANION GAP SERPL CALC-SCNC: 7 MMOL/L (ref 8–16)
B-HCG UR QL: NEGATIVE
BACTERIA #/AREA URNS HPF: ABNORMAL /HPF
BARBITURATES UR QL SCN>200 NG/ML: NEGATIVE
BASOPHILS # BLD AUTO: 0.03 K/UL (ref 0–0.2)
BASOPHILS NFR BLD: 0.5 % (ref 0–1.9)
BENZODIAZ UR QL SCN>200 NG/ML: NEGATIVE
BILIRUB UR QL STRIP: NEGATIVE
BUN SERPL-MCNC: 5 MG/DL (ref 6–20)
BZE UR QL SCN: NEGATIVE
CALCIUM SERPL-MCNC: 9.1 MG/DL (ref 8.7–10.5)
CANNABINOIDS UR QL SCN: NEGATIVE
CHLORIDE SERPL-SCNC: 107 MMOL/L (ref 95–110)
CLARITY UR: CLEAR
CO2 SERPL-SCNC: 25 MMOL/L (ref 23–29)
COLOR UR: COLORLESS
CREAT SERPL-MCNC: 0.6 MG/DL (ref 0.5–1.4)
CREAT UR-MCNC: 36 MG/DL (ref 15–325)
CTP QC/QA: YES
CTP QC/QA: YES
DIFFERENTIAL METHOD: ABNORMAL
EOSINOPHIL # BLD AUTO: 0 K/UL (ref 0–0.5)
EOSINOPHIL NFR BLD: 0.5 % (ref 0–8)
ERYTHROCYTE [DISTWIDTH] IN BLOOD BY AUTOMATED COUNT: 11.9 % (ref 11.5–14.5)
EST. GFR  (NO RACE VARIABLE): >60 ML/MIN/1.73 M^2
GLUCOSE SERPL-MCNC: 109 MG/DL (ref 70–110)
GLUCOSE UR QL STRIP: NEGATIVE
GROUP A STREP, MOLECULAR: NEGATIVE
HCT VFR BLD AUTO: 41.8 % (ref 37–48.5)
HCV AB SERPL QL IA: NEGATIVE
HGB BLD-MCNC: 13.8 G/DL (ref 12–16)
HGB UR QL STRIP: ABNORMAL
HIV 1+2 AB+HIV1 P24 AG SERPL QL IA: NEGATIVE
IMM GRANULOCYTES # BLD AUTO: 0.03 K/UL (ref 0–0.04)
IMM GRANULOCYTES NFR BLD AUTO: 0.5 % (ref 0–0.5)
KETONES UR QL STRIP: NEGATIVE
LEUKOCYTE ESTERASE UR QL STRIP: ABNORMAL
LYMPHOCYTES # BLD AUTO: 1.2 K/UL (ref 1–4.8)
LYMPHOCYTES NFR BLD: 19.4 % (ref 18–48)
MCH RBC QN AUTO: 31 PG (ref 27–31)
MCHC RBC AUTO-ENTMCNC: 33 G/DL (ref 32–36)
MCV RBC AUTO: 94 FL (ref 82–98)
METHADONE UR QL SCN>300 NG/ML: NEGATIVE
MICROSCOPIC COMMENT: ABNORMAL
MONOCYTES # BLD AUTO: 0.8 K/UL (ref 0.3–1)
MONOCYTES NFR BLD: 13.7 % (ref 4–15)
NEUTROPHILS # BLD AUTO: 3.9 K/UL (ref 1.8–7.7)
NEUTROPHILS NFR BLD: 65.4 % (ref 38–73)
NITRITE UR QL STRIP: NEGATIVE
NRBC BLD-RTO: 0 /100 WBC
OPIATES UR QL SCN: NEGATIVE
PCP UR QL SCN>25 NG/ML: NEGATIVE
PH UR STRIP: 7 [PH] (ref 5–8)
PLATELET # BLD AUTO: 277 K/UL (ref 150–450)
PMV BLD AUTO: 8.2 FL (ref 9.2–12.9)
POTASSIUM SERPL-SCNC: 3.7 MMOL/L (ref 3.5–5.1)
PROT UR QL STRIP: NEGATIVE
RBC # BLD AUTO: 4.45 M/UL (ref 4–5.4)
RBC #/AREA URNS HPF: 1 /HPF (ref 0–4)
SARS-COV-2 RDRP RESP QL NAA+PROBE: NEGATIVE
SODIUM SERPL-SCNC: 139 MMOL/L (ref 136–145)
SP GR UR STRIP: 1 (ref 1–1.03)
SQUAMOUS #/AREA URNS HPF: 14 /HPF
TOXICOLOGY INFORMATION: ABNORMAL
URN SPEC COLLECT METH UR: ABNORMAL
UROBILINOGEN UR STRIP-ACNC: NEGATIVE EU/DL
WBC # BLD AUTO: 5.97 K/UL (ref 3.9–12.7)
WBC #/AREA URNS HPF: 17 /HPF (ref 0–5)

## 2023-04-07 PROCEDURE — 81025 URINE PREGNANCY TEST: CPT | Performed by: EMERGENCY MEDICINE

## 2023-04-07 PROCEDURE — 80048 BASIC METABOLIC PNL TOTAL CA: CPT | Performed by: EMERGENCY MEDICINE

## 2023-04-07 PROCEDURE — 87651 STREP A DNA AMP PROBE: CPT | Performed by: EMERGENCY MEDICINE

## 2023-04-07 PROCEDURE — 99284 EMERGENCY DEPT VISIT MOD MDM: CPT | Mod: 25

## 2023-04-07 PROCEDURE — 85025 COMPLETE CBC W/AUTO DIFF WBC: CPT | Performed by: EMERGENCY MEDICINE

## 2023-04-07 PROCEDURE — 87389 HIV-1 AG W/HIV-1&-2 AB AG IA: CPT | Performed by: EMERGENCY MEDICINE

## 2023-04-07 PROCEDURE — 86803 HEPATITIS C AB TEST: CPT | Performed by: EMERGENCY MEDICINE

## 2023-04-07 PROCEDURE — 87086 URINE CULTURE/COLONY COUNT: CPT | Performed by: EMERGENCY MEDICINE

## 2023-04-07 PROCEDURE — 81000 URINALYSIS NONAUTO W/SCOPE: CPT | Mod: 59 | Performed by: EMERGENCY MEDICINE

## 2023-04-07 PROCEDURE — 80307 DRUG TEST PRSMV CHEM ANLYZR: CPT | Performed by: EMERGENCY MEDICINE

## 2023-04-07 RX ORDER — TIZANIDINE 4 MG/1
4 TABLET ORAL 2 TIMES DAILY
COMMUNITY
Start: 2023-04-06 | End: 2024-01-17

## 2023-04-07 RX ORDER — BENZONATATE 100 MG/1
100 CAPSULE ORAL 3 TIMES DAILY PRN
Qty: 30 CAPSULE | Refills: 0 | Status: SHIPPED | OUTPATIENT
Start: 2023-04-07 | End: 2023-04-17

## 2023-04-07 RX ORDER — ONDANSETRON 4 MG/1
4 TABLET, ORALLY DISINTEGRATING ORAL EVERY 6 HOURS PRN
Qty: 20 TABLET | Refills: 0 | Status: SHIPPED | OUTPATIENT
Start: 2023-04-07 | End: 2024-01-17

## 2023-04-07 RX ORDER — CEPHALEXIN 500 MG/1
500 CAPSULE ORAL 4 TIMES DAILY
Qty: 28 CAPSULE | Refills: 0 | Status: SHIPPED | OUTPATIENT
Start: 2023-04-07 | End: 2023-04-14

## 2023-04-07 RX ORDER — NAPROXEN 500 MG/1
500 TABLET ORAL 2 TIMES DAILY WITH MEALS
Qty: 20 TABLET | Refills: 0 | Status: SHIPPED | OUTPATIENT
Start: 2023-04-07 | End: 2024-01-17

## 2023-04-07 RX ORDER — ONABOTULINUMTOXINA 200 [USP'U]/1
INJECTION, POWDER, LYOPHILIZED, FOR SOLUTION INTRADERMAL; INTRAMUSCULAR
COMMUNITY
Start: 2022-11-22 | End: 2024-01-17

## 2023-04-07 NOTE — ED PROVIDER NOTES
Encounter Date: 4/7/2023    SCRIBE #1 NOTE: I, Roxanerm Galvan, am scribing for, and in the presence of,  Get Garcia MD.     History     Chief Complaint   Patient presents with    Neck Pain     Headache, neck pain and stiff neck x 3 days worsening today; pt has hx of cerebral palsy     This is a 27 y.o. female with a PMHx of cerebral palsy who presents with complaint of generalized ill-being. The patient states she is congested, experiencing intermittent headaches, fever and chills, sore throat, and a stiff neck. She also reports a non productive cough. The patient reports that she has been in the hospital for the past week visiting her boyfriend who was in an accident.  The patients reports receiving Botox in her legs, the last dosage being administered a few months ago. She reports rhinorrhea and ear pain. She reports having an IUD and is unsure of her LMP. This is the extent of the patient's complaints at this time.          The history is provided by the patient.   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    Herpes simplex virus (HSV) infection     Mood disorder 7/25/2013    PCOS (polycystic ovarian syndrome)     as per pt    Personality disorder 9/30/2012    Pseudoseizures 7/29/2013    Seizures 2010    Self-harming behavior     Substance abuse      Past Surgical History:   Procedure Laterality Date    FOOT TENDON SURGERY       Family History   Adopted: Yes   Problem Relation Age of Onset    No Known Problems Mother     No Known Problems Father     Glaucoma Neg Hx      Social History     Tobacco Use    Smoking status: Former     Packs/day: 0.50      Years: 6.00     Pack years: 3.00     Types: Vaping w/o nicotine, Cigarettes    Smokeless tobacco: Never    Tobacco comments:     marijuana and cigarettes   Substance Use Topics    Alcohol use: Not Currently     Comment: heavily    Drug use: Yes     Types: Methamphetamines, Marijuana     Comment: 4 months     Review of Systems   Constitutional:  Positive for chills and fever.   HENT:  Positive for congestion and sore throat. Negative for ear pain, rhinorrhea and trouble swallowing.    Respiratory:  Positive for cough. Negative for chest tightness, shortness of breath and wheezing.    Cardiovascular:  Negative for chest pain, palpitations and leg swelling.   Gastrointestinal:  Negative for abdominal pain, diarrhea, nausea and vomiting.   Genitourinary:  Negative for dysuria and vaginal bleeding.   Musculoskeletal:  Positive for neck stiffness.   Neurological:  Positive for headaches. Negative for speech difficulty and light-headedness.   All other systems reviewed and are negative.    Physical Exam     Initial Vitals [04/07/23 0934]   BP Pulse Resp Temp SpO2   110/72 81 16 97.7 °F (36.5 °C) 100 %      MAP       --         Physical Exam    Nursing note and vitals reviewed.  Constitutional: She appears well-developed and well-nourished. She is not diaphoretic. No distress.   HENT:   Head: Normocephalic and atraumatic.   Right Ear: External ear normal.   Left Ear: External ear normal.   Eyes: Conjunctivae and EOM are normal. Pupils are equal, round, and reactive to light.   Neck: Neck supple. No tracheal deviation present. No Brudzinski's sign and no Kernig's sign noted.   Patient able to quickly and repeatedly touch her chin to her chest without pain   Normal range of motion.   Full passive range of motion without pain.     Cardiovascular:  Normal rate, regular rhythm, normal heart sounds and intact distal pulses.     Exam reveals no gallop and no friction rub.       No murmur heard.  Pulmonary/Chest: Breath sounds  normal. No respiratory distress. She has no wheezes. She has no rhonchi. She has no rales. She exhibits no tenderness.   Abdominal: Abdomen is soft. Bowel sounds are normal. She exhibits no distension and no mass. There is no abdominal tenderness.   No CVA tenderness to palpation or percussion There is no rebound and no guarding.   Musculoskeletal:         General: No tenderness or edema.      Cervical back: Full passive range of motion without pain, normal range of motion and neck supple. No erythema or rigidity. No spinous process tenderness or muscular tenderness. Normal range of motion.      Comments: Spasticity noted of lower extremities     Neurological: She is alert and oriented to person, place, and time. No cranial nerve deficit or sensory deficit. GCS score is 15. GCS eye subscore is 4. GCS verbal subscore is 5. GCS motor subscore is 6.   Skin: Skin is warm and dry. Capillary refill takes less than 2 seconds.   Psychiatric: She has a normal mood and affect. Thought content normal.       ED Course   Procedures  Labs Reviewed   CBC W/ AUTO DIFFERENTIAL - Abnormal; Notable for the following components:       Result Value    MPV 8.2 (*)     All other components within normal limits    Narrative:     Release to patient->Immediate   BASIC METABOLIC PANEL - Abnormal; Notable for the following components:    BUN 5 (*)     Anion Gap 7 (*)     All other components within normal limits    Narrative:     Release to patient->Immediate   URINALYSIS, REFLEX TO URINE CULTURE - Abnormal; Notable for the following components:    Color, UA Colorless (*)     Occult Blood UA 1+ (*)     Leukocytes, UA 2+ (*)     All other components within normal limits    Narrative:     Specimen Source->Urine   DRUG SCREEN PANEL, URINE EMERGENCY - Abnormal; Notable for the following components:    Amphetamine Screen, Ur Presumptive Positive (*)     All other components within normal limits    Narrative:     Specimen Source->Urine   URINALYSIS  MICROSCOPIC - Abnormal; Notable for the following components:    WBC, UA 17 (*)     Bacteria Few (*)     All other components within normal limits    Narrative:     Specimen Source->Urine   GROUP A STREP, MOLECULAR   CULTURE, URINE   HIV 1 / 2 ANTIBODY    Narrative:     Release to patient->Immediate   HEPATITIS C ANTIBODY    Narrative:     Release to patient->Immediate   POCT URINE PREGNANCY   SARS-COV-2 RDRP GENE     EKG Readings: (Independently Interpreted)   Rhythm: Normal Sinus Rhythm.   Normal cardiac silhouette. No cardiac effusion.      Imaging Results              X-Ray Chest AP Portable (Final result)  Result time 04/07/23 10:45:42      Final result by Tiarra Crowell MD (04/07/23 10:45:42)                   Impression:      No abnormality identified.      Electronically signed by: Tiarra Crowell MD  Date:    04/07/2023  Time:    10:45               Narrative:    EXAMINATION:  XR CHEST AP PORTABLE    CLINICAL HISTORY:  Chest Pain;    TECHNIQUE:  Single frontal view of the chest was performed.    COMPARISON:  Prior dated 12/20/2013    FINDINGS:  The mediastinal structures are midline.  The cardiac silhouette is not enlarged. The lungs appear grossly clear.                                       Medications - No data to display  Medical Decision Making:   History:   Old Medical Records: I decided to obtain old medical records.  Differential Diagnosis:   Viral illness, COVID, urinary tract infection, strep throat, acute pyelonephritis  Clinical Tests:   Lab Tests: Ordered and Reviewed  Radiological Study: Ordered and Reviewed  Medical Tests: Ordered and Reviewed  ED Management:  19-year-old female evaluated for fevers chills, headache, dry cough sore throat, headaches and neck stiffness.  Patient had full range of motion of her neck, no fever here, no elevated white blood cell count, or shift, and have a low suspicion of meningitis at this time.  Patient has spasticity of her lower extremities secondary  to cerebral palsy for which she gets Botox and she reports that this is at her current baseline.  Discussed with patient that I do believe she has a viral illness that is the cause of the majority of her symptoms, she does happen have a concomitant UTI for which we will give her antibiotics.  Patient discharged home in stable condition. Diagnosis and treatment plan explained to patient. No further workup indicated based on their complaints or examination today. Discussed results with the patient. I educated the patient/guardian on the warning signs and symptoms for which they must seek immediate medical attention. All questions addressed and patient/guardian were given discharge instructions and followup information.         Scribe Attestation:   Scribe #1: I performed the above scribed service and the documentation accurately describes the services I performed. I attest to the accuracy of the note.            Physician Attestation for Scribe: I, MAA, reviewed documentation as scribed in my presence, which is both accurate and complete.       Clinical Impression:   Final diagnoses:  [B34.9] Acute viral syndrome (Primary)  [N39.0] Urinary tract infection without hematuria, site unspecified        ED Disposition Condition    Discharge Stable          ED Prescriptions       Medication Sig Dispense Start Date End Date Auth. Provider    cephALEXin (KEFLEX) 500 MG capsule Take 1 capsule (500 mg total) by mouth 4 (four) times daily. for 7 days 28 capsule 4/7/2023 4/14/2023 Get Garcia MD    naproxen (NAPROSYN) 500 MG tablet Take 1 tablet (500 mg total) by mouth 2 (two) times daily with meals. For pain 20 tablet 4/7/2023 -- Get Garcia MD    ondansetron (ZOFRAN-ODT) 4 MG TbDL Take 1 tablet (4 mg total) by mouth every 6 (six) hours as needed (Nausea and vomiting). 20 tablet 4/7/2023 -- Get Garcia MD    benzonatate (TESSALON) 100 MG capsule Take 1 capsule (100 mg total) by mouth 3 (three) times daily as needed  for Cough. 30 capsule 4/7/2023 4/17/2023 Get Garcia MD          Follow-up Information       Follow up With Specialties Details Why Contact Info    Elijah Beck MD Family Medicine Schedule an appointment as soon as possible for a visit in 3 days For follow-up and re-evaluation [address] [facility]  [place] [id number]  [phone]      [facility] - Emergency Dept Emergency Medicine  As needed, for any new or worsening symptoms [address]  [facility] [id number] [phone]             Get Garcia MD  04/07/23 0520

## 2023-04-07 NOTE — ED TRIAGE NOTES
Arrived via EMS c/o HA, cough, intermittent chills, neck pain and stiff neck x 3 days worsening today. Denies SOB and chest pain.  PMH cerebral palsy.

## 2023-04-07 NOTE — ED NOTES
LOC: The patient is awake, alert, and oriented to self, place, time, and situation. Pt is calm and cooperative. Affect is appropriate.  Speech is appropriate and clear.     APPEARANCE: Patient resting comfortably in no acute distress.  Patient is clean and well groomed.    SKIN: The skin is warm and dry; color consistent with ethnicity.  Patient has normal skin turgor and moist mucus membranes.  Skin intact; no breakdown or bruising noted.     MUSCULOSKELETAL: Patient moving upper and lower extremities without difficulty; denies pain in the extremities or back. Pt ambulates independently with uneven gait secondary to cerebral palsy. Denies weakness; no loss of balance observed or reported. Reports pain and stiffness in neck.     RESPIRATORY: Airway is open and patent. Respirations spontaneous, even, easy, and non-labored.  Patient has a normal effort and rate.  No accessory muscle use noted. Reports non productive cough.     CARDIAC:  Normal rate noted.  No peripheral edema noted. No complaints of chest pain.     ABDOMEN: Soft and non tender to palpation.  No distention noted. Pt denies abdominal pain; denies nausea, vomiting, diarrhea, or constipation.    NEUROLOGIC: Eyes open spontaneously.  Behavior appropriate to situation.  Follows commands; facial expression symmetrical.  Purposeful motor response noted; normal sensation in all extremities. Pt reports headache and lightheadedness; denies visual disturbances; denies loss of balance; denies unilateral weakness.       18

## 2023-04-08 LAB
BACTERIA UR CULT: NORMAL
BACTERIA UR CULT: NORMAL

## 2024-01-14 ENCOUNTER — ON-DEMAND VIRTUAL (OUTPATIENT)
Dept: URGENT CARE | Facility: CLINIC | Age: 29
End: 2024-01-14
Payer: MEDICAID

## 2024-01-14 DIAGNOSIS — H10.9 CONJUNCTIVITIS OF LEFT EYE, UNSPECIFIED CONJUNCTIVITIS TYPE: Primary | ICD-10-CM

## 2024-01-14 PROCEDURE — 99213 OFFICE O/P EST LOW 20 MIN: CPT | Mod: 95,,, | Performed by: PHYSICIAN ASSISTANT

## 2024-01-14 RX ORDER — MOXIFLOXACIN 5 MG/ML
1 SOLUTION/ DROPS OPHTHALMIC 3 TIMES DAILY
Qty: 3 ML | Refills: 0 | Status: SHIPPED | OUTPATIENT
Start: 2024-01-14 | End: 2024-01-17

## 2024-01-15 NOTE — PROGRESS NOTES
Subjective:      Patient ID: Michela No is a 28 y.o. female.    Vitals:  vitals were not taken for this visit.     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    Herpes simplex virus (HSV) infection     Mood disorder 2013    PCOS (polycystic ovarian syndrome)     as per pt    Personality disorder 2012    Pseudoseizures 2013    Seizures 2010    Self-harming behavior     Substance abuse      Past Surgical History:   Procedure Laterality Date    FOOT TENDON SURGERY       Review of patient's allergies indicates:   Allergen Reactions    Fish containing products Swelling     Current Outpatient Medications on File Prior to Visit   Medication Sig Dispense Refill    acetaminophen (TYLENOL) 325 MG tablet Take 2 tablets (650 mg total) by mouth every 6 (six) hours as needed for Pain. 30 tablet 1    BOTOX 200 unit SolR SMARTSI Unit(s) SUB-Q Every 12 Weeks      docusate sodium (COLACE) 100 MG capsule Take 2 capsules (200 mg total) by mouth 2 (two) times daily as needed for Constipation. 60 capsule 0    ibuprofen (ADVIL,MOTRIN) 600 MG tablet Take 1 tablet (600 mg total) by mouth every 6 (six) hours as needed for Pain. 30 tablet 1    naproxen (NAPROSYN) 500 MG tablet Take 1 tablet (500 mg total) by mouth 2 (two) times daily with meals. For pain 20 tablet 0    ondansetron (ZOFRAN-ODT) 4 MG TbDL Take 1 tablet (4 mg total) by mouth every 6 (six) hours as needed (Nausea and vomiting). 20 tablet 0    PNV,calcium 72-iron-folic acid (PRENATAL VITAMIN PLUS LOW IRON) 27 mg iron- 1 mg Tab Take one tablet  daily.  Prescribe prenatal covered by insurance (Patient taking differently: Take one tablet daily.  Prescribe prenatal covered by insurance) 90 tablet 11    tiZANidine (ZANAFLEX) 4 MG tablet Take 4 mg by mouth 2 (two) times daily.      valACYclovir (VALTREX) 500 MG tablet Take 1 tablet (500 mg total) by mouth 2 (two) times daily. 60 tablet 0     No current facility-administered medications on file prior to visit.     Family History   Adopted: Yes   Problem Relation Age of Onset    No Known Problems Mother     No Known Problems Father     Glaucoma Neg Hx        Medications Ordered                [id number] - [place] - [address] AT [facility] [address]   [address] [address]    Telephone: [phone]   Fax: [phone]   Hours: Not open 24 hours                         E-Prescribed (1 of 1)              moxifloxacin (VIGAMOX) 0.5 % ophthalmic solution    Sig: Place 1 drop into the left eye 3 (three) times daily. for 7 days       Start: 1/14/24     Quantity: 3 mL Refills: 0                           Ohs Peq Odvv Intake    1/14/2024 10:56 PM CST - Filed by Patient   What is your current physical address in the event of a medical emergency? [address] [facility], [id number]   Are you able to take your vital signs? Yes   Systolic Blood Pressure:    Diastolic Blood Pressure:    Weight: 124   Height: 66   Pulse: 96   Temperature: 98.6   Respiration rate: 28   Pulse Oxygen:    Please attach any relevant images or files          Patient is a 27 y/o female that calls in to telehealth for drainage and redness to left eye that began today. She does not wear contacts. No eye pain or blurry vision. She has 2 young children. No known sick contacts. She was recently sick with ear infections that she has been treated with on 2 antibiotics recently. She denies chance of pregnancy.         Eyes:  Positive for eye discharge and eye redness.        Objective:   The  physical exam was conducted virtually.  Physical Exam   Constitutional: She is oriented to person, place, and time. No distress.   HENT:   Head: Normocephalic and atraumatic.   Mouth/Throat: Oropharynx is clear and moist and mucous membranes are normal.   Eyes: Conjunctivae are normal. Left eye exhibits discharge.      Comments: (+) left medial conjunctival redness. She is wearing glasses.    Pulmonary/Chest: Effort normal. No respiratory distress.   Musculoskeletal: Normal range of motion.         General: Normal range of motion.   Neurological: She is alert and oriented to person, place, and time.   Skin: Skin is not diaphoretic.   Psychiatric: Her behavior is normal. Judgment and thought content normal.   Vitals reviewed.      Assessment:     1. Conjunctivitis of left eye, unspecified conjunctivitis type        Plan:     Advised patient use warm compresses a few times a day, especially in the morning when the eye is crusted over. Practice good handwashing as this is contagious. Avoid touching your eye. Use prescription eye drops as prescribed. If you develop pain to eye or blurry vision follow up for further evaluation.   Conjunctivitis of left eye, unspecified conjunctivitis type    Other orders  -     moxifloxacin (VIGAMOX) 0.5 % ophthalmic solution; Place 1 drop into the left eye 3 (three) times daily. for 7 days  Dispense: 3 mL; Refill: 0

## 2024-01-15 NOTE — PATIENT INSTRUCTIONS
Advised patient use warm compresses a few times a day, especially in the morning when the eye is crusted over. Practice good handwashing as this is contagious. Avoid touching your eye. Use prescription eye drops as prescribed. If you develop pain to eye or blurry vision follow up for further evaluation.

## 2024-01-17 ENCOUNTER — ON-DEMAND VIRTUAL (OUTPATIENT)
Dept: URGENT CARE | Facility: CLINIC | Age: 29
End: 2024-01-17
Payer: MEDICAID

## 2024-01-17 DIAGNOSIS — H10.9 CONJUNCTIVITIS, UNSPECIFIED CONJUNCTIVITIS TYPE, UNSPECIFIED LATERALITY: Primary | ICD-10-CM

## 2024-01-17 PROCEDURE — 99213 OFFICE O/P EST LOW 20 MIN: CPT | Mod: 95,,, | Performed by: NURSE PRACTITIONER

## 2024-01-17 RX ORDER — ERYTHROMYCIN 5 MG/G
OINTMENT OPHTHALMIC
Qty: 1 G | Refills: 0 | Status: SHIPPED | OUTPATIENT
Start: 2024-01-17

## 2024-01-17 NOTE — PATIENT INSTRUCTIONS
Recommendations:     . Do not rub eyes. Wash hands frequently and disinfect common surfaces to avoid spread.     . Avoid eye make-up as this will likely contaminate your products.     . Warm or cool compresses may be soothing.     . Artificial tears to help lubricate and rinse the eye. Refrigerated drops may be soothing as well.     For allergic conjunctivitis: use antihistamines(Claritin, Zyrtec, Allegra), daily as directed, and allergy eye drops such as Pataday or Zaditor as directed.

## 2024-01-17 NOTE — PROGRESS NOTES
Subjective:      Patient ID: Michela No is a 28 y.o. female.    Vitals:  vitals were not taken for this visit.     Chief Complaint: Eye Problem      Visit Type: TELE AUDIOVISUAL    Present with the patient at the time of consultation: TELEMED PRESENT WITH PATIENT: None    Past Medical History:   Diagnosis Date    ADHD (attention deficit hyperactivity disorder)     Allergy     Anxiety     Borderline personality     Cerebral palsy with spastic/ataxic diplegia     Depression     Genital herpes 2/15/2015    Headache(784.0)     Hemiparesis     since infancy due to shaking.    Herpes simplex virus (HSV) infection     Mood disorder 2013    PCOS (polycystic ovarian syndrome)     as per pt    Personality disorder 2012    Pseudoseizures 2013    Seizures 2010    Self-harming behavior     Substance abuse      Past Surgical History:   Procedure Laterality Date    FOOT TENDON SURGERY       Review of patient's allergies indicates:   Allergen Reactions    Fish containing products Swelling     Current Outpatient Medications on File Prior to Visit   Medication Sig Dispense Refill    acetaminophen (TYLENOL) 325 MG tablet Take 2 tablets (650 mg total) by mouth every 6 (six) hours as needed for Pain. 30 tablet 1    BOTOX 200 unit SolR SMARTSI Unit(s) SUB-Q Every 12 Weeks      docusate sodium (COLACE) 100 MG capsule Take 2 capsules (200 mg total) by mouth 2 (two) times daily as needed for Constipation. 60 capsule 0    ibuprofen (ADVIL,MOTRIN) 600 MG tablet Take 1 tablet (600 mg total) by mouth every 6 (six) hours as needed for Pain. 30 tablet 1    naproxen (NAPROSYN) 500 MG tablet Take 1 tablet (500 mg total) by mouth 2 (two) times daily with meals. For pain 20 tablet 0    ondansetron (ZOFRAN-ODT) 4 MG TbDL Take 1 tablet (4 mg total) by mouth every 6 (six) hours as needed (Nausea and vomiting). 20 tablet 0    PNV,calcium 72-iron-folic acid (PRENATAL VITAMIN PLUS LOW IRON) 27 mg iron- 1 mg Tab Take one tablet  daily.  Prescribe prenatal covered by insurance (Patient taking differently: Take one tablet daily.  Prescribe prenatal covered by insurance) 90 tablet 11    tiZANidine (ZANAFLEX) 4 MG tablet Take 4 mg by mouth 2 (two) times daily.      valACYclovir (VALTREX) 500 MG tablet Take 1 tablet (500 mg total) by mouth 2 (two) times daily. 60 tablet 0    [DISCONTINUED] moxifloxacin (VIGAMOX) 0.5 % ophthalmic solution Place 1 drop into the left eye 3 (three) times daily. for 7 days 3 mL 0     No current facility-administered medications on file prior to visit.     Family History   Adopted: Yes   Problem Relation Age of Onset    No Known Problems Mother     No Known Problems Father     Glaucoma Neg Hx        Medications Ordered                [id number] - [place] -  [address] AT [facility] [address]    [place] [id number]    Telephone: [phone]   Fax: [phone]   Hours: Not open 24 hours                         E-Prescribed (1 of 1)              erythromycin (ROMYCIN) ophthalmic ointment    Si cm ribbon to eye(s) 2-3 times per day, may use up to 6 times per day for 7-10 days       Start: 24     Quantity: 1 g Refills: 0                           Ohs Peq Odvv Intake    2024 12:55 PM CST - Filed by Patient   What is your current physical address in the event of a medical emergency? [address] [place] [address]   Are you able to take your vital signs? No   Please attach any relevant images or files          Recently treated for conjunctivitis. Unable to place eye gtts, requesting ointment instead. No contact use. No new issues or concerns.    Eye Problem   Associated symptoms include an eye discharge and eye redness.       Eyes:  Positive for eye discharge and eye redness.        Objective:   The physical exam was conducted virtually.  Physical Exam   Constitutional: She is oriented to person, place, and time. She does not appear ill. No  distress.   HENT:   Head: Normocephalic and atraumatic.   Nose: Nose normal.   Eyes: Extraocular movement intact   Pulmonary/Chest: Effort normal.   Abdominal: Normal appearance.   Musculoskeletal: Normal range of motion.         General: Normal range of motion.   Neurological: no focal deficit. She is alert and oriented to person, place, and time.   Psychiatric: Her behavior is normal. Mood normal.   Vitals reviewed.      Assessment:     1. Conjunctivitis, unspecified conjunctivitis type, unspecified laterality        Plan:   Patient encouraged to monitor symptoms closely and instructed to follow-up for new or worsening symptoms. Further, in-person, evaluation may be necessary for continued treatment. Please follow up with your primary care doctor or specialist as needed. Verbally discussed plan. Patient confirms understanding and is in agreement with treatment and plan.     You must understand that you've received a Virtual Care evaluation only and that you may be released before all your medical problems are known or treated. You, the patient, will arrange for follow up care as instructed.      Conjunctivitis, unspecified conjunctivitis type, unspecified laterality  -     erythromycin (ROMYCIN) ophthalmic ointment; 1 cm ribbon to eye(s) 2-3 times per day, may use up to 6 times per day for 7-10 days  Dispense: 1 g; Refill: 0      Patient Instructions   Recommendations:     . Do not rub eyes. Wash hands frequently and disinfect common surfaces to avoid spread.     . Avoid eye make-up as this will likely contaminate your products.     . Warm or cool compresses may be soothing.     . Artificial tears to help lubricate and rinse the eye. Refrigerated drops may be soothing as well.     For allergic conjunctivitis: use antihistamines(Claritin, Zyrtec, Allegra), daily as directed, and allergy eye drops such as Pataday or Zaditor as directed.

## 2024-10-15 NOTE — PROGRESS NOTES
Obstetrical ultrasound performed today. Please see imaging tab in EPIC for full ultrasound report and recommendations.       
None

## 2024-10-21 ENCOUNTER — HOSPITAL ENCOUNTER (INPATIENT)
Facility: HOSPITAL | Age: 29
LOS: 3 days | Discharge: PSYCHIATRIC OR SUBSTANCE USE FACILITY | DRG: 917 | End: 2024-10-25
Attending: STUDENT IN AN ORGANIZED HEALTH CARE EDUCATION/TRAINING PROGRAM | Admitting: STUDENT IN AN ORGANIZED HEALTH CARE EDUCATION/TRAINING PROGRAM
Payer: MEDICAID

## 2024-10-21 DIAGNOSIS — T50.901A OVERDOSE: ICD-10-CM

## 2024-10-21 DIAGNOSIS — T44.7X2A: ICD-10-CM

## 2024-10-21 DIAGNOSIS — R07.9 CHEST PAIN: ICD-10-CM

## 2024-10-21 DIAGNOSIS — T50.901A INGESTION OF UNKNOWN DRUG: ICD-10-CM

## 2024-10-21 DIAGNOSIS — T14.91XA SUICIDE ATTEMPT: Primary | ICD-10-CM

## 2024-10-21 DIAGNOSIS — R00.1 BRADYCARDIA: ICD-10-CM

## 2024-10-21 LAB
ALBUMIN SERPL BCP-MCNC: 4.9 G/DL (ref 3.5–5.2)
ALP SERPL-CCNC: 63 U/L (ref 40–150)
ALT SERPL W/O P-5'-P-CCNC: 43 U/L (ref 10–44)
AMPHET+METHAMPHET UR QL: ABNORMAL
ANION GAP SERPL CALC-SCNC: 10 MMOL/L (ref 8–16)
APAP SERPL-MCNC: <3 UG/ML (ref 10–20)
AST SERPL-CCNC: 46 U/L (ref 10–40)
B-HCG UR QL: NEGATIVE
BARBITURATES UR QL SCN>200 NG/ML: NEGATIVE
BASOPHILS # BLD AUTO: 0.08 K/UL (ref 0–0.2)
BASOPHILS NFR BLD: 0.7 % (ref 0–1.9)
BENZODIAZ UR QL SCN>200 NG/ML: NEGATIVE
BILIRUB SERPL-MCNC: 0.8 MG/DL (ref 0.1–1)
BILIRUB UR QL STRIP: NEGATIVE
BUN SERPL-MCNC: 15 MG/DL (ref 6–20)
BZE UR QL SCN: NEGATIVE
CALCIUM SERPL-MCNC: 10.2 MG/DL (ref 8.7–10.5)
CANNABINOIDS UR QL SCN: NEGATIVE
CHLORIDE SERPL-SCNC: 105 MMOL/L (ref 95–110)
CLARITY UR: ABNORMAL
CO2 SERPL-SCNC: 24 MMOL/L (ref 23–29)
COLOR UR: YELLOW
CREAT SERPL-MCNC: 0.9 MG/DL (ref 0.5–1.4)
CREAT UR-MCNC: 40.2 MG/DL (ref 15–325)
CTP QC/QA: YES
CTP QC/QA: YES
DIFFERENTIAL METHOD BLD: ABNORMAL
EOSINOPHIL # BLD AUTO: 0.1 K/UL (ref 0–0.5)
EOSINOPHIL NFR BLD: 0.4 % (ref 0–8)
ERYTHROCYTE [DISTWIDTH] IN BLOOD BY AUTOMATED COUNT: 11.8 % (ref 11.5–14.5)
EST. GFR  (NO RACE VARIABLE): >60 ML/MIN/1.73 M^2
ETHANOL SERPL-MCNC: <10 MG/DL
GLUCOSE SERPL-MCNC: 106 MG/DL (ref 70–110)
GLUCOSE UR QL STRIP: NEGATIVE
HCG INTACT+B SERPL-ACNC: <1.2 MIU/ML
HCT VFR BLD AUTO: 44.5 % (ref 37–48.5)
HGB BLD-MCNC: 15.3 G/DL (ref 12–16)
HGB UR QL STRIP: NEGATIVE
IMM GRANULOCYTES # BLD AUTO: 0.02 K/UL (ref 0–0.04)
IMM GRANULOCYTES NFR BLD AUTO: 0.2 % (ref 0–0.5)
KETONES UR QL STRIP: NEGATIVE
LEUKOCYTE ESTERASE UR QL STRIP: ABNORMAL
LYMPHOCYTES # BLD AUTO: 2.4 K/UL (ref 1–4.8)
LYMPHOCYTES NFR BLD: 21 % (ref 18–48)
MCH RBC QN AUTO: 31.6 PG (ref 27–31)
MCHC RBC AUTO-ENTMCNC: 34.4 G/DL (ref 32–36)
MCV RBC AUTO: 92 FL (ref 82–98)
METHADONE UR QL SCN>300 NG/ML: NEGATIVE
MICROSCOPIC COMMENT: NORMAL
MONOCYTES # BLD AUTO: 0.7 K/UL (ref 0.3–1)
MONOCYTES NFR BLD: 6 % (ref 4–15)
NEUTROPHILS # BLD AUTO: 8.2 K/UL (ref 1.8–7.7)
NEUTROPHILS NFR BLD: 71.7 % (ref 38–73)
NITRITE UR QL STRIP: NEGATIVE
NRBC BLD-RTO: 0 /100 WBC
OPIATES UR QL SCN: NEGATIVE
PCP UR QL SCN>25 NG/ML: NEGATIVE
PH UR STRIP: 7 [PH] (ref 5–8)
PLATELET # BLD AUTO: 393 K/UL (ref 150–450)
PMV BLD AUTO: 8.4 FL (ref 9.2–12.9)
POCT GLUCOSE: 129 MG/DL (ref 70–110)
POTASSIUM SERPL-SCNC: 4.2 MMOL/L (ref 3.5–5.1)
PROT SERPL-MCNC: 8.5 G/DL (ref 6–8.4)
PROT UR QL STRIP: NEGATIVE
RBC # BLD AUTO: 4.84 M/UL (ref 4–5.4)
SALICYLATES SERPL-MCNC: <5 MG/DL (ref 15–30)
SARS-COV-2 RDRP RESP QL NAA+PROBE: NEGATIVE
SODIUM SERPL-SCNC: 139 MMOL/L (ref 136–145)
SP GR UR STRIP: 1.01 (ref 1–1.03)
SQUAMOUS #/AREA URNS HPF: 14 /HPF
TOXICOLOGY INFORMATION: ABNORMAL
TSH SERPL DL<=0.005 MIU/L-ACNC: 3.17 UIU/ML (ref 0.4–4)
URN SPEC COLLECT METH UR: ABNORMAL
UROBILINOGEN UR STRIP-ACNC: NEGATIVE EU/DL
WBC # BLD AUTO: 11.39 K/UL (ref 3.9–12.7)
WBC #/AREA URNS HPF: 3 /HPF (ref 0–5)

## 2024-10-21 PROCEDURE — 80053 COMPREHEN METABOLIC PANEL: CPT | Performed by: NURSE PRACTITIONER

## 2024-10-21 PROCEDURE — 80179 DRUG ASSAY SALICYLATE: CPT | Performed by: NURSE PRACTITIONER

## 2024-10-21 PROCEDURE — 81025 URINE PREGNANCY TEST: CPT | Performed by: NURSE PRACTITIONER

## 2024-10-21 PROCEDURE — 84443 ASSAY THYROID STIM HORMONE: CPT | Performed by: NURSE PRACTITIONER

## 2024-10-21 PROCEDURE — 87635 SARS-COV-2 COVID-19 AMP PRB: CPT | Performed by: STUDENT IN AN ORGANIZED HEALTH CARE EDUCATION/TRAINING PROGRAM

## 2024-10-21 PROCEDURE — 80143 DRUG ASSAY ACETAMINOPHEN: CPT | Performed by: NURSE PRACTITIONER

## 2024-10-21 PROCEDURE — 85025 COMPLETE CBC W/AUTO DIFF WBC: CPT | Performed by: NURSE PRACTITIONER

## 2024-10-21 PROCEDURE — 82077 ASSAY SPEC XCP UR&BREATH IA: CPT | Performed by: NURSE PRACTITIONER

## 2024-10-21 PROCEDURE — 80307 DRUG TEST PRSMV CHEM ANLYZR: CPT | Performed by: NURSE PRACTITIONER

## 2024-10-21 PROCEDURE — 84702 CHORIONIC GONADOTROPIN TEST: CPT | Performed by: NURSE PRACTITIONER

## 2024-10-21 PROCEDURE — 81000 URINALYSIS NONAUTO W/SCOPE: CPT | Mod: 59 | Performed by: NURSE PRACTITIONER

## 2024-10-21 NOTE — ED TRIAGE NOTES
"Pt to ED for evaluation of possible drug overdose s/p "taking a whole bottle of propanolol pills around 4 pm today". Pt states that she made herself vomit immediately after, and is denying any symptoms at this time. Pt states that she "was arguing with her boyfriend and just was trying to get his attention but has no intention on killing herself". Pt denies SI/HI, AH/VH. Pt also endorses that she is under the care of outpatient psychiatry. Pt endorses use of methamphetamine today as well. Pt tearful and anxious upon assessment. Pmhx of Anxiety, cerebral palsy, HSV, mood disorder, substance abuse and pseudoseizures.   "

## 2024-10-21 NOTE — ED PROVIDER NOTES
"Encounter Date: 10/21/2024       History     Chief Complaint   Patient presents with    Psychiatric Evaluation     28 yo fem to triage for suicide attempt. Pt took a full bottle of propranolol 20 mg around 4pm states it was a full bottle of 80-90 pills. Denies HI, AH/VH. AAOx4    Suicide Attempt     29-year-old female with history of borderline personality disorder presents for evaluation of propranolol ingestion.  She reports taking approximately 80 tablets of 20 mg propranolol at around 4:00 p.m. in front of her boyfriend.  She is not sure why it was prescribed but she takes it intermittently for tachycardia, and she takes it intermittently to help with impending migraines.  It was last filled about a month ago and she was estimates there were 80 pills left in the bottle.  She states that she frequently feels overwhelmed, can be impulsive, and her boyfriend seemed to want to leave, so she ingested those pills has " a desperate attempt to keep him from leaving".  She denies current suicidal ideation or homicidal ideation.      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    Herpes simplex virus (HSV) infection     Mood disorder 7/25/2013    PCOS (polycystic ovarian syndrome)     as per pt    Personality disorder 9/30/2012    Pseudoseizures 7/29/2013    Seizures 2010    Self-harming behavior     Substance abuse      Past Surgical History:   Procedure Laterality Date    FOOT TENDON SURGERY       Family History   Adopted: Yes   Problem Relation Name Age of Onset    No Known Problems Mother      No Known Problems Father      Glaucoma Neg Hx       Social History "     Tobacco Use    Smoking status: Former     Current packs/day: 0.50     Average packs/day: 0.5 packs/day for 6.0 years (3.0 ttl pk-yrs)     Types: Vaping w/o nicotine, Cigarettes    Smokeless tobacco: Never    Tobacco comments:     marijuana and cigarettes   Substance Use Topics    Alcohol use: Not Currently     Comment: heavily    Drug use: Yes     Types: Methamphetamines, Marijuana     Comment: 4 months     Review of Systems   Psychiatric/Behavioral:  Positive for self-injury.        Physical Exam     Initial Vitals [10/21/24 1810]   BP Pulse Resp Temp SpO2   (!) 130/94 75 17 98.4 °F (36.9 °C) 100 %      MAP       --         Physical Exam    Nursing note and vitals reviewed.  Constitutional: She appears well-developed and well-nourished. She is not diaphoretic.   HENT:   Head: Normocephalic and atraumatic. Mouth/Throat: Oropharynx is clear and moist.   Eyes: EOM are normal. Pupils are equal, round, and reactive to light. Right eye exhibits no discharge. Left eye exhibits no discharge.   Neck: No tracheal deviation present.   Normal range of motion.  Cardiovascular:  Normal rate, regular rhythm and intact distal pulses.           Pulmonary/Chest: No respiratory distress. She has no wheezes. She exhibits no tenderness.   Abdominal: Abdomen is soft. She exhibits no distension. There is no abdominal tenderness.   Musculoskeletal:         General: No tenderness or edema. Normal range of motion.      Cervical back: Normal range of motion.     Neurological: She is alert and oriented to person, place, and time. She has normal strength. No cranial nerve deficit or sensory deficit. GCS eye subscore is 4. GCS verbal subscore is 5. GCS motor subscore is 6.   Skin: Skin is warm and dry. No rash noted.   Psychiatric:   Linear, coherent, somewhat labile mood.  Impulsive behavior.         ED Course   Procedures  Labs Reviewed   CBC W/ AUTO DIFFERENTIAL - Abnormal       Result Value    WBC 11.39      RBC 4.84      Hemoglobin  15.3      Hematocrit 44.5      MCV 92      MCH 31.6 (*)     MCHC 34.4      RDW 11.8      Platelets 393      MPV 8.4 (*)     Immature Granulocytes 0.2      Gran # (ANC) 8.2 (*)     Immature Grans (Abs) 0.02      Lymph # 2.4      Mono # 0.7      Eos # 0.1      Baso # 0.08      nRBC 0      Gran % 71.7      Lymph % 21.0      Mono % 6.0      Eosinophil % 0.4      Basophil % 0.7      Differential Method Automated     COMPREHENSIVE METABOLIC PANEL - Abnormal    Sodium 139      Potassium 4.2      Chloride 105      CO2 24      Glucose 106      BUN 15      Creatinine 0.9      Calcium 10.2      Total Protein 8.5 (*)     Albumin 4.9      Total Bilirubin 0.8      Alkaline Phosphatase 63      AST 46 (*)     ALT 43      eGFR >60      Anion Gap 10     URINALYSIS, REFLEX TO URINE CULTURE - Abnormal    Specimen UA Urine, Clean Catch      Color, UA Yellow      Appearance, UA Hazy (*)     pH, UA 7.0      Specific Gravity, UA 1.010      Protein, UA Negative      Glucose, UA Negative      Ketones, UA Negative      Bilirubin (UA) Negative      Occult Blood UA Negative      Nitrite, UA Negative      Urobilinogen, UA Negative      Leukocytes, UA Trace (*)     Narrative:     Specimen Source->Urine   DRUG SCREEN PANEL, URINE EMERGENCY - Abnormal    Benzodiazepines Negative      Methadone metabolites Negative      Cocaine (Metab.) Negative      Opiate Scrn, Ur Negative      Barbiturate Screen, Ur Negative      Amphetamine Screen, Ur Presumptive Positive (*)     THC Negative      Phencyclidine Negative      Creatinine, Urine 40.2      Toxicology Information SEE COMMENT      Narrative:     Specimen Source->Urine   ACETAMINOPHEN LEVEL - Abnormal    Acetaminophen (Tylenol), Serum <3.0 (*)    SALICYLATE LEVEL - Abnormal    Salicylate Lvl <5.0 (*)    POCT URINE PREGNANCY - Abnormal    POC Preg Test, Ur Positive (*)      Acceptable Yes     POCT GLUCOSE - Abnormal    POCT Glucose 129 (*)    TSH    TSH 3.173     ALCOHOL,MEDICAL (ETHANOL)     Alcohol, Serum <10     HCG, QUANTITATIVE   HCG, QUANTITATIVE    HCG Quant <1.2     URINALYSIS MICROSCOPIC    WBC, UA 3      Squam Epithel, UA 14      Microscopic Comment SEE COMMENT      Narrative:     Specimen Source->Urine   SARS-COV-2 RNA AMPLIFICATION, QUAL   SARS-COV-2 RDRP GENE    POC Rapid COVID Negative       Acceptable Yes     POCT GLUCOSE MONITORING CONTINUOUS     EKG Readings: (Independently Interpreted)   EKG with regular rate, regular rhythm, normal axis, no acute ST elevations or depressions, normal KS, QRS and QT interval. Interpreted by me.         Imaging Results              X-Ray Chest AP Portable (Final result)  Result time 10/21/24 18:37:52      Final result by Teodora Sears MD (10/21/24 18:37:52)                   Impression:      No acute cardiopulmonary process identified.      Electronically signed by: Teodora Sears MD  Date:    10/21/2024  Time:    18:37               Narrative:    EXAMINATION:  XR CHEST AP PORTABLE    CLINICAL HISTORY:  Poisoning by unspecified drugs, medicaments and biological substances, accidental (unintentional), initial encounter    TECHNIQUE:  Single frontal view of the chest was performed.    COMPARISON:  April 2023.    FINDINGS:  Cardiac silhouette is normal in size.  Lungs are symmetrically expanded.  No evidence of focal consolidative process, pneumothorax, or significant pleural effusion.  No acute osseous abnormality identified.                                       Medications - No data to display  Medical Decision Making             ED Course as of 10/21/24 2235   Mon Oct 21, 2024   1854 Attempted to call boyfriend, Dean Chávez, for collateral, at [phone], but there was no answer. [BS]   1854 POCT glucose(!)  Patient was not hypoglycemic, not altered, no respiratory depression.  EKG shows sinus rhythm, regular rate, normal KS, QRS, QT intervals, no evidence of arrhythmia, no evidence of QT prolongation, no evidence of bradycardia  to suggest beta-blocker overdose [BS]   2027 HCG, Quantitative  Patient had positive UPT but beta quant is negative, patient is not pregnant. [BS]   2234 Patient continues to show no signs of beta-blocker overdose.  Heart rate is in the 60s to 70s.  Blood pressure is within normal limits.  EKG without evidence of arrhythmia, QT prolongation.  Fingerstick glucose within normal limits.  Patient ambulates with steady gait, she has no signs of respiratory depression or altered mental status to suggest underlying intoxication.  Case discussed with Psychiatry, who recommended pec for suicidal behavior and pill ingestion.  Pec placed.  Patient is medically cleared for psychiatric placement. [BS]      ED Course User Index  [BS] Bo Caputo MD         Medically cleared for psychiatry placement: 10/21/2024 10:33 PM                   Clinical Impression:  Final diagnoses:  [T50.901A] Ingestion of unknown drug  [T14.91XA] Suicide attempt (Primary)          ED Disposition Condition    Transfer to Psych Facility Stable          ED Prescriptions    None       Follow-up Information    None          Bo Caputo MD  10/21/24 2238

## 2024-10-22 PROBLEM — T14.91XA SUICIDE ATTEMPT: Status: ACTIVE | Noted: 2024-10-22

## 2024-10-22 PROBLEM — R57.0 CARDIOGENIC SHOCK: Status: ACTIVE | Noted: 2024-10-22

## 2024-10-22 PROBLEM — T44.7X2A: Status: ACTIVE | Noted: 2024-10-22

## 2024-10-22 LAB
ALBUMIN SERPL BCP-MCNC: 4.3 G/DL (ref 3.5–5.2)
ALLENS TEST: ABNORMAL
ALP SERPL-CCNC: 55 U/L (ref 40–150)
ALT SERPL W/O P-5'-P-CCNC: 43 U/L (ref 10–44)
ANION GAP SERPL CALC-SCNC: 6 MMOL/L (ref 8–16)
AST SERPL-CCNC: 37 U/L (ref 10–40)
BASOPHILS # BLD AUTO: 0.07 K/UL (ref 0–0.2)
BASOPHILS NFR BLD: 1 % (ref 0–1.9)
BILIRUB SERPL-MCNC: 1.2 MG/DL (ref 0.1–1)
BNP SERPL-MCNC: 42 PG/ML (ref 0–99)
BUN SERPL-MCNC: 10 MG/DL (ref 6–20)
CALCIUM SERPL-MCNC: 10.4 MG/DL (ref 8.7–10.5)
CHLORIDE SERPL-SCNC: 111 MMOL/L (ref 95–110)
CO2 SERPL-SCNC: 24 MMOL/L (ref 23–29)
CREAT SERPL-MCNC: 0.8 MG/DL (ref 0.5–1.4)
DIFFERENTIAL METHOD BLD: ABNORMAL
EOSINOPHIL # BLD AUTO: 0.2 K/UL (ref 0–0.5)
EOSINOPHIL NFR BLD: 2.6 % (ref 0–8)
ERYTHROCYTE [DISTWIDTH] IN BLOOD BY AUTOMATED COUNT: 12 % (ref 11.5–14.5)
EST. GFR  (NO RACE VARIABLE): >60 ML/MIN/1.73 M^2
GLUCOSE SERPL-MCNC: 130 MG/DL (ref 70–110)
HCO3 UR-SCNC: 24.7 MMOL/L (ref 24–28)
HCT VFR BLD AUTO: 44.4 % (ref 37–48.5)
HGB BLD-MCNC: 14.7 G/DL (ref 12–16)
IMM GRANULOCYTES # BLD AUTO: 0.04 K/UL (ref 0–0.04)
IMM GRANULOCYTES NFR BLD AUTO: 0.6 % (ref 0–0.5)
LACTATE SERPL-SCNC: 0.7 MMOL/L (ref 0.5–2.2)
LACTATE SERPL-SCNC: 0.8 MMOL/L (ref 0.5–2.2)
LYMPHOCYTES # BLD AUTO: 2.4 K/UL (ref 1–4.8)
LYMPHOCYTES NFR BLD: 33.1 % (ref 18–48)
MAGNESIUM SERPL-MCNC: 2 MG/DL (ref 1.6–2.6)
MCH RBC QN AUTO: 31.3 PG (ref 27–31)
MCHC RBC AUTO-ENTMCNC: 33.1 G/DL (ref 32–36)
MCV RBC AUTO: 95 FL (ref 82–98)
MONOCYTES # BLD AUTO: 0.5 K/UL (ref 0.3–1)
MONOCYTES NFR BLD: 7.5 % (ref 4–15)
NEUTROPHILS # BLD AUTO: 4 K/UL (ref 1.8–7.7)
NEUTROPHILS NFR BLD: 55.2 % (ref 38–73)
NRBC BLD-RTO: 0 /100 WBC
OHS QRS DURATION: 68 MS
OHS QRS DURATION: 80 MS
OHS QTC CALCULATION: 415 MS
OHS QTC CALCULATION: 435 MS
PCO2 BLDA: 52.2 MMHG (ref 35–45)
PH SMN: 7.28 [PH] (ref 7.35–7.45)
PHOSPHATE SERPL-MCNC: 2.7 MG/DL (ref 2.7–4.5)
PLATELET # BLD AUTO: 301 K/UL (ref 150–450)
PMV BLD AUTO: 8.4 FL (ref 9.2–12.9)
PO2 BLDA: 42 MMHG (ref 40–60)
POC BE: -2 MMOL/L
POC SATURATED O2: 71 % (ref 95–100)
POC TCO2: 26 MMOL/L (ref 24–29)
POCT GLUCOSE: 86 MG/DL (ref 70–110)
POTASSIUM SERPL-SCNC: 4.7 MMOL/L (ref 3.5–5.1)
PROT SERPL-MCNC: 7.6 G/DL (ref 6–8.4)
RBC # BLD AUTO: 4.69 M/UL (ref 4–5.4)
SAMPLE: ABNORMAL
SITE: ABNORMAL
SODIUM SERPL-SCNC: 141 MMOL/L (ref 136–145)
TROPONIN I SERPL DL<=0.01 NG/ML-MCNC: <0.006 NG/ML (ref 0–0.03)
WBC # BLD AUTO: 7.23 K/UL (ref 3.9–12.7)

## 2024-10-22 PROCEDURE — 94761 N-INVAS EAR/PLS OXIMETRY MLT: CPT

## 2024-10-22 PROCEDURE — 02HV33Z INSERTION OF INFUSION DEVICE INTO SUPERIOR VENA CAVA, PERCUTANEOUS APPROACH: ICD-10-PCS | Performed by: STUDENT IN AN ORGANIZED HEALTH CARE EDUCATION/TRAINING PROGRAM

## 2024-10-22 PROCEDURE — 85025 COMPLETE CBC W/AUTO DIFF WBC: CPT | Performed by: EMERGENCY MEDICINE

## 2024-10-22 PROCEDURE — 25000003 PHARM REV CODE 250: Performed by: EMERGENCY MEDICINE

## 2024-10-22 PROCEDURE — 83605 ASSAY OF LACTIC ACID: CPT | Mod: 91 | Performed by: INTERNAL MEDICINE

## 2024-10-22 PROCEDURE — 25000003 PHARM REV CODE 250: Performed by: INTERNAL MEDICINE

## 2024-10-22 PROCEDURE — 84484 ASSAY OF TROPONIN QUANT: CPT | Performed by: EMERGENCY MEDICINE

## 2024-10-22 PROCEDURE — 36569 INSJ PICC 5 YR+ W/O IMAGING: CPT

## 2024-10-22 PROCEDURE — 99900035 HC TECH TIME PER 15 MIN (STAT)

## 2024-10-22 PROCEDURE — 93005 ELECTROCARDIOGRAM TRACING: CPT

## 2024-10-22 PROCEDURE — 25000003 PHARM REV CODE 250: Performed by: STUDENT IN AN ORGANIZED HEALTH CARE EDUCATION/TRAINING PROGRAM

## 2024-10-22 PROCEDURE — 93010 ELECTROCARDIOGRAM REPORT: CPT | Mod: ,,, | Performed by: INTERNAL MEDICINE

## 2024-10-22 PROCEDURE — 80053 COMPREHEN METABOLIC PANEL: CPT | Performed by: EMERGENCY MEDICINE

## 2024-10-22 PROCEDURE — 99449 NTRPROF PH1/NTRNET/EHR 31/>: CPT | Mod: ,,, | Performed by: STUDENT IN AN ORGANIZED HEALTH CARE EDUCATION/TRAINING PROGRAM

## 2024-10-22 PROCEDURE — 83735 ASSAY OF MAGNESIUM: CPT | Performed by: STUDENT IN AN ORGANIZED HEALTH CARE EDUCATION/TRAINING PROGRAM

## 2024-10-22 PROCEDURE — 84100 ASSAY OF PHOSPHORUS: CPT | Performed by: STUDENT IN AN ORGANIZED HEALTH CARE EDUCATION/TRAINING PROGRAM

## 2024-10-22 PROCEDURE — 63600175 PHARM REV CODE 636 W HCPCS: Performed by: INTERNAL MEDICINE

## 2024-10-22 PROCEDURE — 93010 ELECTROCARDIOGRAM REPORT: CPT | Mod: 59,,, | Performed by: INTERNAL MEDICINE

## 2024-10-22 PROCEDURE — 96372 THER/PROPH/DIAG INJ SC/IM: CPT

## 2024-10-22 PROCEDURE — 20000000 HC ICU ROOM

## 2024-10-22 PROCEDURE — 96372 THER/PROPH/DIAG INJ SC/IM: CPT | Performed by: PHYSICIAN ASSISTANT

## 2024-10-22 PROCEDURE — 83605 ASSAY OF LACTIC ACID: CPT | Performed by: EMERGENCY MEDICINE

## 2024-10-22 PROCEDURE — 63600175 PHARM REV CODE 636 W HCPCS: Mod: JZ,JG | Performed by: PHYSICIAN ASSISTANT

## 2024-10-22 PROCEDURE — 83880 ASSAY OF NATRIURETIC PEPTIDE: CPT | Performed by: EMERGENCY MEDICINE

## 2024-10-22 PROCEDURE — 63600175 PHARM REV CODE 636 W HCPCS: Performed by: EMERGENCY MEDICINE

## 2024-10-22 PROCEDURE — 99291 CRITICAL CARE FIRST HOUR: CPT | Mod: ,,, | Performed by: INTERNAL MEDICINE

## 2024-10-22 PROCEDURE — 63600175 PHARM REV CODE 636 W HCPCS: Performed by: STUDENT IN AN ORGANIZED HEALTH CARE EDUCATION/TRAINING PROGRAM

## 2024-10-22 PROCEDURE — 63600175 PHARM REV CODE 636 W HCPCS

## 2024-10-22 PROCEDURE — C1751 CATH, INF, PER/CENT/MIDLINE: HCPCS

## 2024-10-22 RX ORDER — PROCHLORPERAZINE EDISYLATE 5 MG/ML
5 INJECTION INTRAMUSCULAR; INTRAVENOUS EVERY 6 HOURS PRN
Status: DISCONTINUED | OUTPATIENT
Start: 2024-10-22 | End: 2024-10-25 | Stop reason: HOSPADM

## 2024-10-22 RX ORDER — SODIUM CHLORIDE 9 MG/ML
1000 INJECTION, SOLUTION INTRAVENOUS
Status: COMPLETED | OUTPATIENT
Start: 2024-10-22 | End: 2024-10-22

## 2024-10-22 RX ORDER — MIDAZOLAM HYDROCHLORIDE 2 MG/2ML
1 INJECTION, SOLUTION INTRAMUSCULAR; INTRAVENOUS
Status: DISCONTINUED | OUTPATIENT
Start: 2024-10-22 | End: 2024-10-25 | Stop reason: HOSPADM

## 2024-10-22 RX ORDER — PROPRANOLOL HYDROCHLORIDE 20 MG/1
20 TABLET ORAL 3 TIMES DAILY
COMMUNITY
End: 2024-10-22

## 2024-10-22 RX ORDER — LANOLIN ALCOHOL/MO/W.PET/CERES
800 CREAM (GRAM) TOPICAL
Status: DISCONTINUED | OUTPATIENT
Start: 2024-10-22 | End: 2024-10-25 | Stop reason: HOSPADM

## 2024-10-22 RX ORDER — LORAZEPAM 0.5 MG/1
1 TABLET ORAL EVERY 4 HOURS PRN
Status: DISCONTINUED | OUTPATIENT
Start: 2024-10-22 | End: 2024-10-24

## 2024-10-22 RX ORDER — TALC
6 POWDER (GRAM) TOPICAL NIGHTLY
Status: DISCONTINUED | OUTPATIENT
Start: 2024-10-22 | End: 2024-10-25 | Stop reason: HOSPADM

## 2024-10-22 RX ORDER — IBUPROFEN 200 MG
24 TABLET ORAL
Status: DISCONTINUED | OUTPATIENT
Start: 2024-10-22 | End: 2024-10-25 | Stop reason: HOSPADM

## 2024-10-22 RX ORDER — ONDANSETRON 8 MG/1
8 TABLET, ORALLY DISINTEGRATING ORAL EVERY 8 HOURS PRN
Status: DISCONTINUED | OUTPATIENT
Start: 2024-10-22 | End: 2024-10-25 | Stop reason: HOSPADM

## 2024-10-22 RX ORDER — ATROPINE SULFATE 0.1 MG/ML
1 INJECTION INTRAVENOUS
Status: DISCONTINUED | OUTPATIENT
Start: 2024-10-22 | End: 2024-10-25 | Stop reason: HOSPADM

## 2024-10-22 RX ORDER — SODIUM CHLORIDE 0.9 % (FLUSH) 0.9 %
10 SYRINGE (ML) INJECTION EVERY 12 HOURS PRN
Status: DISCONTINUED | OUTPATIENT
Start: 2024-10-22 | End: 2024-10-25 | Stop reason: HOSPADM

## 2024-10-22 RX ORDER — OLANZAPINE 10 MG/2ML
10 INJECTION, POWDER, FOR SOLUTION INTRAMUSCULAR ONCE AS NEEDED
Status: COMPLETED | OUTPATIENT
Start: 2024-10-22 | End: 2024-10-22

## 2024-10-22 RX ORDER — SIMETHICONE 80 MG
1 TABLET,CHEWABLE ORAL 4 TIMES DAILY PRN
Status: DISCONTINUED | OUTPATIENT
Start: 2024-10-22 | End: 2024-10-25 | Stop reason: HOSPADM

## 2024-10-22 RX ORDER — SODIUM,POTASSIUM PHOSPHATES 280-250MG
2 POWDER IN PACKET (EA) ORAL
Status: DISCONTINUED | OUTPATIENT
Start: 2024-10-22 | End: 2024-10-25 | Stop reason: HOSPADM

## 2024-10-22 RX ORDER — POLYETHYLENE GLYCOL 3350 17 G/17G
17 POWDER, FOR SOLUTION ORAL 2 TIMES DAILY PRN
Status: DISCONTINUED | OUTPATIENT
Start: 2024-10-22 | End: 2024-10-25 | Stop reason: HOSPADM

## 2024-10-22 RX ORDER — AMOXICILLIN 250 MG
1 CAPSULE ORAL DAILY PRN
Status: DISCONTINUED | OUTPATIENT
Start: 2024-10-22 | End: 2024-10-25 | Stop reason: HOSPADM

## 2024-10-22 RX ORDER — MUPIROCIN 20 MG/G
OINTMENT TOPICAL 2 TIMES DAILY
Status: DISCONTINUED | OUTPATIENT
Start: 2024-10-22 | End: 2024-10-25 | Stop reason: HOSPADM

## 2024-10-22 RX ORDER — NALOXONE HCL 0.4 MG/ML
0.02 VIAL (ML) INJECTION
Status: DISCONTINUED | OUTPATIENT
Start: 2024-10-22 | End: 2024-10-25 | Stop reason: HOSPADM

## 2024-10-22 RX ORDER — INSULIN ASPART 100 [IU]/ML
0-5 INJECTION, SOLUTION INTRAVENOUS; SUBCUTANEOUS
Status: DISCONTINUED | OUTPATIENT
Start: 2024-10-22 | End: 2024-10-25 | Stop reason: HOSPADM

## 2024-10-22 RX ORDER — IBUPROFEN 200 MG
16 TABLET ORAL
Status: DISCONTINUED | OUTPATIENT
Start: 2024-10-22 | End: 2024-10-25 | Stop reason: HOSPADM

## 2024-10-22 RX ORDER — LORAZEPAM 2 MG/ML
1 INJECTION INTRAMUSCULAR
Status: COMPLETED | OUTPATIENT
Start: 2024-10-22 | End: 2024-10-22

## 2024-10-22 RX ORDER — NOREPINEPHRINE BITARTRATE/D5W 4MG/250ML
0-3 PLASTIC BAG, INJECTION (ML) INTRAVENOUS CONTINUOUS
Status: DISCONTINUED | OUTPATIENT
Start: 2024-10-22 | End: 2024-10-22

## 2024-10-22 RX ORDER — IPRATROPIUM BROMIDE AND ALBUTEROL SULFATE 2.5; .5 MG/3ML; MG/3ML
3 SOLUTION RESPIRATORY (INHALATION) EVERY 6 HOURS PRN
Status: DISCONTINUED | OUTPATIENT
Start: 2024-10-22 | End: 2024-10-25 | Stop reason: HOSPADM

## 2024-10-22 RX ORDER — CALCIUM GLUCONATE 20 MG/ML
1 INJECTION, SOLUTION INTRAVENOUS
Status: DISCONTINUED | OUTPATIENT
Start: 2024-10-22 | End: 2024-10-22

## 2024-10-22 RX ORDER — ACETAMINOPHEN 325 MG/1
650 TABLET ORAL EVERY 4 HOURS PRN
Status: DISCONTINUED | OUTPATIENT
Start: 2024-10-22 | End: 2024-10-25 | Stop reason: HOSPADM

## 2024-10-22 RX ORDER — ATROPINE SULFATE 0.1 MG/ML
1 INJECTION INTRAVENOUS ONCE
Status: COMPLETED | OUTPATIENT
Start: 2024-10-22 | End: 2024-10-22

## 2024-10-22 RX ORDER — GLUCAGON 1 MG
1 KIT INJECTION
Status: DISCONTINUED | OUTPATIENT
Start: 2024-10-22 | End: 2024-10-25 | Stop reason: HOSPADM

## 2024-10-22 RX ORDER — CALCIUM GLUCONATE 20 MG/ML
3 INJECTION, SOLUTION INTRAVENOUS
Status: COMPLETED | OUTPATIENT
Start: 2024-10-22 | End: 2024-10-22

## 2024-10-22 RX ADMIN — ATROPINE SULFATE 1 MG: 0.1 INJECTION INTRAVENOUS at 02:10

## 2024-10-22 RX ADMIN — CALCIUM GLUCONATE 3 G: 20 INJECTION, SOLUTION INTRAVENOUS at 11:10

## 2024-10-22 RX ADMIN — SODIUM CHLORIDE 1000 ML: 9 INJECTION, SOLUTION INTRAVENOUS at 10:10

## 2024-10-22 RX ADMIN — OLANZAPINE 10 MG: 10 INJECTION, POWDER, FOR SOLUTION INTRAMUSCULAR at 08:10

## 2024-10-22 RX ADMIN — ATROPINE SULFATE 1 MG: 0.1 INJECTION INTRAVENOUS at 11:10

## 2024-10-22 RX ADMIN — LORAZEPAM 1 MG: 2 INJECTION INTRAMUSCULAR; INTRAVENOUS at 08:10

## 2024-10-22 RX ADMIN — EPINEPHRINE 0.2 MCG/KG/MIN: 1 INJECTION INTRAMUSCULAR; INTRAVENOUS; SUBCUTANEOUS at 09:10

## 2024-10-22 RX ADMIN — SODIUM CHLORIDE 1000 ML: 9 INJECTION, SOLUTION INTRAVENOUS at 09:10

## 2024-10-22 RX ADMIN — EPINEPHRINE 0.02 MCG/KG/MIN: 1 INJECTION INTRAMUSCULAR; INTRAVENOUS; SUBCUTANEOUS at 11:10

## 2024-10-22 RX ADMIN — MUPIROCIN: 20 OINTMENT TOPICAL at 09:10

## 2024-10-22 RX ADMIN — NOREPINEPHRINE BITARTRATE 0.03 MCG/KG/MIN: 4 INJECTION, SOLUTION INTRAVENOUS at 11:10

## 2024-10-22 NOTE — ED NOTES
Nurse notified patient that she got placement. Patient became upset. MARLA BENITEZ went and talk ewith patient. Patient was very upset. Called for security and the charge nurse at this bedside just in case patient tried to leave. PA was able to deescalate that issue.

## 2024-10-22 NOTE — ASSESSMENT & PLAN NOTE
Toxicology notes reviewed. Currently compensating well with epi alone at low dose. Will hold off on high dose insulin therapy for now especially with normal EF, BP and HR on low dose epi. Will place PICC now to facilitate if needed.

## 2024-10-22 NOTE — ED NOTES
At 0810 EMS arrived to transfer pt to another facility.  Pt became tearful and screaming she did not want to go and leave her babies.  She refused to be restrained to the stretcher for her protection.  PRN meds given for safe transfer.

## 2024-10-22 NOTE — SUBJECTIVE & OBJECTIVE
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    Herpes simplex virus (HSV) infection     Mood disorder 7/25/2013    PCOS (polycystic ovarian syndrome)     as per pt    Personality disorder 9/30/2012    Pseudoseizures 7/29/2013    Seizures 2010    Self-harming behavior     Substance abuse        Past Surgical History:   Procedure Laterality Date    FOOT TENDON SURGERY         Review of patient's allergies indicates:   Allergen Reactions    Fish containing products Swelling       No current facility-administered medications on file prior to encounter.     Current Outpatient Medications on File Prior to Encounter   Medication Sig    acetaminophen (TYLENOL) 325 MG tablet Take 2 tablets (650 mg total) by mouth every 6 (six) hours as needed for Pain.    erythromycin (ROMYCIN) ophthalmic ointment 1 cm ribbon to eye(s) 2-3 times per day, may use up to 6 times per day for 7-10 days    PNV,calcium 72-iron-folic acid (PRENATAL VITAMIN PLUS LOW IRON) 27 mg iron- 1 mg Tab Take one tablet daily.  Prescribe prenatal covered by insurance (Patient taking differently: Take one tablet daily.  Prescribe prenatal covered by insurance)    valACYclovir (VALTREX) 500 MG tablet Take 1 tablet (500 mg total) by mouth 2 (two) times daily.    [DISCONTINUED] ibuprofen (ADVIL,MOTRIN) 600 MG tablet Take 1 tablet (600 mg total) by mouth every 6 (six) hours as needed for Pain.    [DISCONTINUED] propranoloL (INDERAL) 20 MG tablet Take 20 mg by mouth 3 (three) times daily.     Family History       Problem Relation (Age of Onset)    No Known Problems Mother, Father          Tobacco Use    Smoking status: Former     Current packs/day: 0.50     Average packs/day: 0.5 packs/day for 6.0 years (3.0 ttl  pk-yrs)     Types: Vaping w/o nicotine, Cigarettes    Smokeless tobacco: Never    Tobacco comments:     marijuana and cigarettes   Substance and Sexual Activity    Alcohol use: Not Currently     Comment: heavily    Drug use: Yes     Types: Methamphetamines, Marijuana     Comment: 4 months    Sexual activity: Yes     Partners: Male     Birth control/protection: None     Comment: condom during outbreak     Review of Systems   Unable to perform ROS: Acuity of condition     Objective:     Vital Signs (Most Recent):  Temp: (P) 97.6 °F (36.4 °C) (10/22/24 1327)  Pulse: 63 (10/22/24 1327)  Resp: 15 (10/22/24 1327)  BP: 124/79 (10/22/24 1327)  SpO2: 100 % (10/22/24 1327) Vital Signs (24h Range):  Temp:  [97.6 °F (36.4 °C)-98.5 °F (36.9 °C)] (P) 97.6 °F (36.4 °C)  Pulse:  [38-76] 63  Resp:  [10-49] 15  SpO2:  [98 %-100 %] 100 %  BP: ()/(37-95) 124/79     Weight: 72.6 kg (160 lb)  Body mass index is 25.82 kg/m².     Physical Exam  Constitutional:       Appearance: She is ill-appearing and toxic-appearing.      Comments: Drowsy   Eyes:      General: No scleral icterus.     Pupils: Pupils are equal, round, and reactive to light.   Cardiovascular:      Rate and Rhythm: Bradycardia present.      Heart sounds: No murmur heard.     No gallop.   Pulmonary:      Effort: No respiratory distress.      Breath sounds: No stridor. No wheezing.   Abdominal:      Tenderness: There is no abdominal tenderness. There is no guarding.   Musculoskeletal:         General: No swelling or deformity.   Skin:     Coloration: Skin is not jaundiced.      Findings: No bruising.   Neurological:      Mental Status: She is disoriented.      Cranial Nerves: No cranial nerve deficit.      Motor: No weakness.              CRANIAL NERVES     CN III, IV, VI   Pupils are equal, round, and reactive to light.           Recent Results (from the past 24 hours)   POCT glucose    Collection Time: 10/21/24  6:22 PM   Result Value Ref Range    POCT Glucose 129 (H)  70 - 110 mg/dL   CBC auto differential    Collection Time: 10/21/24  6:30 PM   Result Value Ref Range    WBC 11.39 3.90 - 12.70 K/uL    RBC 4.84 4.00 - 5.40 M/uL    Hemoglobin 15.3 12.0 - 16.0 g/dL    Hematocrit 44.5 37.0 - 48.5 %    MCV 92 82 - 98 fL    MCH 31.6 (H) 27.0 - 31.0 pg    MCHC 34.4 32.0 - 36.0 g/dL    RDW 11.8 11.5 - 14.5 %    Platelets 393 150 - 450 K/uL    MPV 8.4 (L) 9.2 - 12.9 fL    Immature Granulocytes 0.2 0.0 - 0.5 %    Gran # (ANC) 8.2 (H) 1.8 - 7.7 K/uL    Immature Grans (Abs) 0.02 0.00 - 0.04 K/uL    Lymph # 2.4 1.0 - 4.8 K/uL    Mono # 0.7 0.3 - 1.0 K/uL    Eos # 0.1 0.0 - 0.5 K/uL    Baso # 0.08 0.00 - 0.20 K/uL    nRBC 0 0 /100 WBC    Gran % 71.7 38.0 - 73.0 %    Lymph % 21.0 18.0 - 48.0 %    Mono % 6.0 4.0 - 15.0 %    Eosinophil % 0.4 0.0 - 8.0 %    Basophil % 0.7 0.0 - 1.9 %    Differential Method Automated    Comprehensive metabolic panel    Collection Time: 10/21/24  6:30 PM   Result Value Ref Range    Sodium 139 136 - 145 mmol/L    Potassium 4.2 3.5 - 5.1 mmol/L    Chloride 105 95 - 110 mmol/L    CO2 24 23 - 29 mmol/L    Glucose 106 70 - 110 mg/dL    BUN 15 6 - 20 mg/dL    Creatinine 0.9 0.5 - 1.4 mg/dL    Calcium 10.2 8.7 - 10.5 mg/dL    Total Protein 8.5 (H) 6.0 - 8.4 g/dL    Albumin 4.9 3.5 - 5.2 g/dL    Total Bilirubin 0.8 0.1 - 1.0 mg/dL    Alkaline Phosphatase 63 40 - 150 U/L    AST 46 (H) 10 - 40 U/L    ALT 43 10 - 44 U/L    eGFR >60 >60 mL/min/1.73 m^2    Anion Gap 10 8 - 16 mmol/L   TSH    Collection Time: 10/21/24  6:30 PM   Result Value Ref Range    TSH 3.173 0.400 - 4.000 uIU/mL   Ethanol    Collection Time: 10/21/24  6:30 PM   Result Value Ref Range    Alcohol, Serum <10 <10 mg/dL   Acetaminophen level    Collection Time: 10/21/24  6:30 PM   Result Value Ref Range    Acetaminophen (Tylenol), Serum <3.0 (L) 10.0 - 20.0 ug/mL   Salicylate level    Collection Time: 10/21/24  6:30 PM   Result Value Ref Range    Salicylate Lvl <5.0 (L) 15.0 - 30.0 mg/dL   HCG, Quantitative     Collection Time: 10/21/24  6:30 PM   Result Value Ref Range    HCG Quant <1.2 See Text mIU/mL   POCT COVID-19 Rapid Screening    Collection Time: 10/21/24  7:13 PM   Result Value Ref Range    POC Rapid COVID Negative Negative     Acceptable Yes    Urinalysis, Reflex to Urine Culture Urine, Clean Catch    Collection Time: 10/21/24  7:25 PM    Specimen: Urine   Result Value Ref Range    Specimen UA Urine, Clean Catch     Color, UA Yellow Yellow, Straw, Maia    Appearance, UA Hazy (A) Clear    pH, UA 7.0 5.0 - 8.0    Specific Gravity, UA 1.010 1.005 - 1.030    Protein, UA Negative Negative    Glucose, UA Negative Negative    Ketones, UA Negative Negative    Bilirubin (UA) Negative Negative    Occult Blood UA Negative Negative    Nitrite, UA Negative Negative    Urobilinogen, UA Negative <2.0 EU/dL    Leukocytes, UA Trace (A) Negative   Drug screen panel, emergency    Collection Time: 10/21/24  7:25 PM   Result Value Ref Range    Benzodiazepines Negative Negative    Methadone metabolites Negative Negative    Cocaine (Metab.) Negative Negative    Opiate Scrn, Ur Negative Negative    Barbiturate Screen, Ur Negative Negative    Amphetamine Screen, Ur Presumptive Positive (A) Negative    THC Negative Negative    Phencyclidine Negative Negative    Creatinine, Urine 40.2 15.0 - 325.0 mg/dL    Toxicology Information SEE COMMENT    Urinalysis Microscopic    Collection Time: 10/21/24  7:25 PM   Result Value Ref Range    WBC, UA 3 0 - 5 /hpf    Squam Epithel, UA 14 /hpf    Microscopic Comment SEE COMMENT    POCT urine pregnancy    Collection Time: 10/21/24  7:26 PM   Result Value Ref Range    POC Preg Test, Ur Negative Negative     Acceptable Yes    POCT glucose    Collection Time: 10/22/24 10:39 AM   Result Value Ref Range    POCT Glucose 86 70 - 110 mg/dL   Troponin I    Collection Time: 10/22/24 11:27 AM   Result Value Ref Range    Troponin I <0.006 0.000 - 0.026 ng/mL   CBC auto differential     Collection Time: 10/22/24  1:36 PM   Result Value Ref Range    WBC 7.23 3.90 - 12.70 K/uL    RBC 4.69 4.00 - 5.40 M/uL    Hemoglobin 14.7 12.0 - 16.0 g/dL    Hematocrit 44.4 37.0 - 48.5 %    MCV 95 82 - 98 fL    MCH 31.3 (H) 27.0 - 31.0 pg    MCHC 33.1 32.0 - 36.0 g/dL    RDW 12.0 11.5 - 14.5 %    Platelets 301 150 - 450 K/uL    MPV 8.4 (L) 9.2 - 12.9 fL    Immature Granulocytes 0.6 (H) 0.0 - 0.5 %    Gran # (ANC) 4.0 1.8 - 7.7 K/uL    Immature Grans (Abs) 0.04 0.00 - 0.04 K/uL    Lymph # 2.4 1.0 - 4.8 K/uL    Mono # 0.5 0.3 - 1.0 K/uL    Eos # 0.2 0.0 - 0.5 K/uL    Baso # 0.07 0.00 - 0.20 K/uL    nRBC 0 0 /100 WBC    Gran % 55.2 38.0 - 73.0 %    Lymph % 33.1 18.0 - 48.0 %    Mono % 7.5 4.0 - 15.0 %    Eosinophil % 2.6 0.0 - 8.0 %    Basophil % 1.0 0.0 - 1.9 %    Differential Method Automated        Microbiology Results (last 7 days)       ** No results found for the last 168 hours. **             Imaging Results              X-Ray Chest AP Portable (Final result)  Result time 10/21/24 18:37:52      Final result by Teodora Sears MD (10/21/24 18:37:52)                   Impression:      No acute cardiopulmonary process identified.      Electronically signed by: Teodora Sears MD  Date:    10/21/2024  Time:    18:37               Narrative:    EXAMINATION:  XR CHEST AP PORTABLE    CLINICAL HISTORY:  Poisoning by unspecified drugs, medicaments and biological substances, accidental (unintentional), initial encounter    TECHNIQUE:  Single frontal view of the chest was performed.    COMPARISON:  April 2023.    FINDINGS:  Cardiac silhouette is normal in size.  Lungs are symmetrically expanded.  No evidence of focal consolidative process, pneumothorax, or significant pleural effusion.  No acute osseous abnormality identified.

## 2024-10-22 NOTE — HPI
Michela No is a 29 y.o. female who has a past medical history of ADHD (attention deficit hyperactivity disorder), Allergy, Anxiety, Borderline personality, Cerebral palsy with spastic/ataxic diplegia, Depression, Genital herpes, Herpes simplex virus (HSV) infection, Mood disorder, PCOS (polycystic ovarian syndrome), Personality disorder, Pseudoseizures, Seizures, Self-harming behavior, and Substance abuse, presented to the ED after inentional overdose. Patient is drowsy and HPI largely from ED: She reported she took 80-90 tablets [propanolol] around 4pm yesterday, reports vomiting shortly thereafter, and presented suicidal. She was evaluated and cleared around midnight. Transferred to the psychiatric facility this morning after becoming agitated with zyprexa/ativan given. She was hypotensive and returned to us here, and has been bradycardic. I have been on the phone with poison center who stated peak action would be 6-8 hrs, however after discussion with tox, seems more consistent with bb overdose and could have delayed effect 24 hours out. Complicating this is reported meth abuse with last use yesterday. Repeat labwork is ordered, she is trialed on atropine/calcium and some slight improvement in her HR now to the low 70s, still mildly hypotensive on levophed peripherally at this point. Tox recommended change to epi gtt. If she shows signs of cardiac dysfunction, high dose insulin would be the next step.     Patient bradycardic to 30's and hypotensive to 70/41, received atropine 1 mg and Calcium gluconate and started on epinephrine gtt. Poison control consulted and recs made.

## 2024-10-22 NOTE — ED PROVIDER NOTES
Encounter Date: 10/21/2024       History     Chief Complaint   Patient presents with    Psychiatric Evaluation     28 yo fem to triage for suicide attempt. Pt took a full bottle of propranolol 20 mg around 4pm states it was a full bottle of 80-90 pills. Denies HI, LUANNE/DEMETRA. AAOx4    Suicide Attempt     HPI    29-year-old female with past medical history of ADHD, hemiparesis, mood disorder, pseudoseizures, cerebral palsy, history of substance abuse presents back from psychiatric facility with concerns for low blood pressure.  Patient reportedly had ingested 80-90 propranolol 20 mg tablets around 4:00 p.m. yesterday in a suicide attempt.  Patient was observed in ultimately cleared later yesterday night.  Patient became quite agitated this morning around 6 - 7:00 a.m. receiving Zyprexa and Ativan.  She subsequently had improvement and was resting comfortably upon transfer to the psychiatric facility.  Upon arrival blood pressure was noted to be low and she was returned to the emergency department.  Mental status has continued to be drowsy with some bradycardia now noted and hypotension.    Review of patient's allergies indicates:   Allergen Reactions    Fish containing products Swelling     Past Medical History:   Diagnosis Date    ADHD (attention deficit hyperactivity disorder)     Allergy     Anxiety     Borderline personality     Cerebral palsy with spastic/ataxic diplegia     Depression     Genital herpes 02/15/2015    Headache(784.0)     Hemiparesis     since infancy due to shaking.    Herpes simplex virus (HSV) infection     Intentional overdose of beta-adrenergic blocking drug 10/22/2024    Mood disorder 07/25/2013    PCOS (polycystic ovarian syndrome)     as per pt    Personality disorder 09/30/2012    Pseudoseizures 07/29/2013    Seizures 2010    Self-harming behavior     Substance abuse     Suicide attempt 10/22/2024     Past Surgical History:   Procedure Laterality Date    FOOT TENDON SURGERY       Family  History   Adopted: Yes   Problem Relation Name Age of Onset    No Known Problems Mother      No Known Problems Father      Glaucoma Neg Hx       Social History     Tobacco Use    Smoking status: Former     Current packs/day: 0.50     Average packs/day: 0.5 packs/day for 6.0 years (3.0 ttl pk-yrs)     Types: Vaping w/o nicotine, Cigarettes    Smokeless tobacco: Never    Tobacco comments:     marijuana and cigarettes   Substance Use Topics    Alcohol use: Not Currently     Comment: heavily    Drug use: Yes     Types: Methamphetamines, Marijuana     Comment: 4 months     Review of Systems   Unable to perform ROS: Acuity of condition       Physical Exam     Initial Vitals [10/21/24 1810]   BP Pulse Resp Temp SpO2   (!) 130/94 75 17 98.4 °F (36.9 °C) 100 %      MAP       --         Physical Exam    Nursing note and vitals reviewed.  Constitutional: She is not diaphoretic. No distress.   Drowsy, lethargic appearing   HENT:   Head: Normocephalic and atraumatic.   Nose: Nose normal.   Eyes: EOM are normal. Pupils are equal, round, and reactive to light.   Neck: Neck supple. No JVD present.   Normal range of motion.  Cardiovascular:  Regular rhythm, normal heart sounds and intact distal pulses.           Bradycardic, regular   Pulmonary/Chest: Breath sounds normal. No stridor. No respiratory distress (Even and unlabored, clear to auscultation bilaterally). She has no wheezes. She has no rhonchi. She has no rales.   Abdominal: Abdomen is soft. Bowel sounds are normal. She exhibits no distension. There is no abdominal tenderness.   Musculoskeletal:         General: No tenderness or edema. Normal range of motion.      Cervical back: Normal range of motion and neck supple.     Neurological: She is oriented to person, place, and time. She has normal strength.   Drowsy, oriented, moving all extremities   Skin: Skin is warm and dry. Capillary refill takes less than 2 seconds. No rash noted. No erythema.         ED Course   Critical  Care    Date/Time: 10/24/2024 8:58 AM    Performed by: Frank Figueroa MD  Authorized by: Frank Figueroa MD  Direct patient critical care time: 45 minutes  Additional history critical care time: 10 minutes  Ordering / reviewing critical care time: 5 minutes  Documentation critical care time: 5 minutes  Consulting other physicians critical care time: 10 minutes  Total critical care time (exclusive of procedural time) : 75 minutes  Critical care time was exclusive of separately billable procedures and treating other patients and teaching time.  Critical care was necessary to treat or prevent imminent or life-threatening deterioration of the following conditions: circulatory failure, toxidrome and shock.  Critical care was time spent personally by me on the following activities: development of treatment plan with patient or surrogate, evaluation of patient's response to treatment, examination of patient, obtaining history from patient or surrogate, ordering and performing treatments and interventions, ordering and review of laboratory studies, ordering and review of radiographic studies, re-evaluation of patient's condition, review of old charts and pulse oximetry.        Labs Reviewed   CBC W/ AUTO DIFFERENTIAL - Abnormal       Result Value    WBC 11.39      RBC 4.84      Hemoglobin 15.3      Hematocrit 44.5      MCV 92      MCH 31.6 (*)     MCHC 34.4      RDW 11.8      Platelets 393      MPV 8.4 (*)     Immature Granulocytes 0.2      Gran # (ANC) 8.2 (*)     Immature Grans (Abs) 0.02      Lymph # 2.4      Mono # 0.7      Eos # 0.1      Baso # 0.08      nRBC 0      Gran % 71.7      Lymph % 21.0      Mono % 6.0      Eosinophil % 0.4      Basophil % 0.7      Differential Method Automated     COMPREHENSIVE METABOLIC PANEL - Abnormal    Sodium 139      Potassium 4.2      Chloride 105      CO2 24      Glucose 106      BUN 15      Creatinine 0.9      Calcium 10.2      Total Protein 8.5 (*)     Albumin 4.9       Total Bilirubin 0.8      Alkaline Phosphatase 63      AST 46 (*)     ALT 43      eGFR >60      Anion Gap 10     URINALYSIS, REFLEX TO URINE CULTURE - Abnormal    Specimen UA Urine, Clean Catch      Color, UA Yellow      Appearance, UA Hazy (*)     pH, UA 7.0      Specific Gravity, UA 1.010      Protein, UA Negative      Glucose, UA Negative      Ketones, UA Negative      Bilirubin (UA) Negative      Occult Blood UA Negative      Nitrite, UA Negative      Urobilinogen, UA Negative      Leukocytes, UA Trace (*)     Narrative:     Specimen Source->Urine   DRUG SCREEN PANEL, URINE EMERGENCY - Abnormal    Benzodiazepines Negative      Methadone metabolites Negative      Cocaine (Metab.) Negative      Opiate Scrn, Ur Negative      Barbiturate Screen, Ur Negative      Amphetamine Screen, Ur Presumptive Positive (*)     THC Negative      Phencyclidine Negative      Creatinine, Urine 40.2      Toxicology Information SEE COMMENT      Narrative:     Specimen Source->Urine   ACETAMINOPHEN LEVEL - Abnormal    Acetaminophen (Tylenol), Serum <3.0 (*)    SALICYLATE LEVEL - Abnormal    Salicylate Lvl <5.0 (*)    CBC W/ AUTO DIFFERENTIAL - Abnormal    WBC 7.23      RBC 4.69      Hemoglobin 14.7      Hematocrit 44.4      MCV 95      MCH 31.3 (*)     MCHC 33.1      RDW 12.0      Platelets 301      MPV 8.4 (*)     Immature Granulocytes 0.6 (*)     Gran # (ANC) 4.0      Immature Grans (Abs) 0.04      Lymph # 2.4      Mono # 0.5      Eos # 0.2      Baso # 0.07      nRBC 0      Gran % 55.2      Lymph % 33.1      Mono % 7.5      Eosinophil % 2.6      Basophil % 1.0      Differential Method Automated      Narrative:     Collection has been rescheduled by DVD at 10/22/2024 13:00 Reason:   Unable to collect nurse beverley pelaez  Collection has been rescheduled by [email or web address] at 10/22/2024 13:18 Reason:   Unable to collect very hard stick  Nurse was notified.   POCT GLUCOSE - Abnormal    POCT Glucose 129 (*)    TSH    TSH 3.173     ALCOHOL,MEDICAL  (ETHANOL)    Alcohol, Serum <10     HCG, QUANTITATIVE   HCG, QUANTITATIVE    HCG Quant <1.2     URINALYSIS MICROSCOPIC    WBC, UA 3      Squam Epithel, UA 14      Microscopic Comment SEE COMMENT      Narrative:     Specimen Source->Urine   TROPONIN I    Troponin I <0.006     POCT URINE PREGNANCY    POC Preg Test, Ur Negative       Acceptable Yes     SARS-COV-2 RDRP GENE    POC Rapid COVID Negative       Acceptable Yes     POCT GLUCOSE    POCT Glucose 86     POCT GLUCOSE MONITORING CONTINUOUS        ECG Results              EKG 12-lead (Final result)        Collection Time Result Time QRS Duration OHS QTC Calculation    10/22/24 11:09:33 10/22/24 20:44:49 80 435                     Final result by Interface, Lab In [facility] (10/22/24 20:44:55)                   Narrative:    Test Reason : R00.1,    Vent. Rate : 046 BPM     Atrial Rate : 046 BPM     P-R Int : 140 ms          QRS Dur : 080 ms      QT Int : 498 ms       P-R-T Axes : 071 082 081 degrees     QTc Int : 435 ms    Sinus bradycardia  Otherwise normal ECG  When compared with ECG of 21-OCT-2024 18:22,  Significant changes have occurred  Confirmed by Lisandro Segundo MD ([id number]) on 10/22/2024 8:44:44 PM    Referred By: [email or web address]   SELF           Confirmed By:Lisandro Segundo MD                                     EKG 12-lead (Final result)  Result time 10/22/24 10:49:21      Final result by Unknown User (10/22/24 10:49:21)                                      Imaging Results              X-Ray Chest AP Portable (Final result)  Result time 10/21/24 18:37:52      Final result by Teodora Sears MD (10/21/24 18:37:52)                   Impression:      No acute cardiopulmonary process identified.      Electronically signed by: Teodora Sears MD  Date:    10/21/2024  Time:    18:37               Narrative:    EXAMINATION:  XR CHEST AP PORTABLE    CLINICAL HISTORY:  Poisoning by unspecified drugs, medicaments and biological substances,  accidental (unintentional), initial encounter    TECHNIQUE:  Single frontal view of the chest was performed.    COMPARISON:  April 2023.    FINDINGS:  Cardiac silhouette is normal in size.  Lungs are symmetrically expanded.  No evidence of focal consolidative process, pneumothorax, or significant pleural effusion.  No acute osseous abnormality identified.                                       Medications   LORazepam tablet 1 mg (1 mg Oral Not Given 10/22/24 0813)   EPINEPHrine (ADRENALIN) 5 mg in D5W 250 mL infusion (0.04 mcg/kg/min × 72.6 kg Intravenous Verify Only 10/24/24 0700)   insulin aspart U-100 pen 0-5 Units (has no administration in time range)   albuterol-ipratropium 2.5 mg-0.5 mg/3 mL nebulizer solution 3 mL (has no administration in time range)   melatonin tablet 6 mg (6 mg Oral Not Given 10/23/24 2116)   ondansetron disintegrating tablet 8 mg (has no administration in time range)   prochlorperazine injection Soln 5 mg (has no administration in time range)   polyethylene glycol packet 17 g (has no administration in time range)   senna-docusate 8.6-50 mg per tablet 1 tablet (has no administration in time range)   simethicone chewable tablet 80 mg (has no administration in time range)   acetaminophen tablet 650 mg (650 mg Oral Given 10/23/24 1940)   naloxone 0.4 mg/mL injection 0.02 mg (has no administration in time range)   potassium bicarbonate disintegrating tablet 50 mEq (50 mEq Oral Given 10/24/24 0544)   potassium bicarbonate disintegrating tablet 35 mEq (has no administration in time range)   potassium bicarbonate disintegrating tablet 60 mEq (has no administration in time range)   magnesium oxide tablet 800 mg (has no administration in time range)   magnesium oxide tablet 800 mg (has no administration in time range)   potassium, sodium phosphates 280-160-250 mg packet 2 packet (has no administration in time range)   potassium, sodium phosphates 280-160-250 mg packet 2 packet (has no administration  in time range)   potassium, sodium phosphates 280-160-250 mg packet 2 packet (has no administration in time range)   glucose chewable tablet 16 g (has no administration in time range)   glucose chewable tablet 24 g (has no administration in time range)   glucagon (human recombinant) injection 1 mg (has no administration in time range)   atropine injection 1 mg (1 mg Intravenous Not Given 10/22/24 2213)   sodium chloride 0.9% flush 10 mL (has no administration in time range)   mupirocin 2 % ointment ( Nasal Given 10/23/24 2116)   midazolam (PF) (VERSED) 1 mg/mL injection 1 mg (1 mg Intravenous Not Given 10/22/24 1712)   OLANZapine injection 10 mg (10 mg Intramuscular Given 10/22/24 0820)   LORazepam injection 1 mg (1 mg Intramuscular Given 10/22/24 0820)   0.9%  NaCl infusion (0 mLs Intravenous Stopped 10/22/24 1042)   0.9%  NaCl infusion (1,000 mLs Intravenous New Bag 10/22/24 1047)   atropine injection 1 mg (1 mg Intravenous Given 10/22/24 1130)   calcium gluconate 1 g in NS IVPB (premixed) (0 g Intravenous Stopped 10/22/24 1233)   ibuprofen tablet 600 mg (600 mg Oral Given 10/23/24 0609)     Medical Decision Making             ED Course as of 10/24/24 0900   Mon Oct 21, 2024   1854 Attempted to call boyfriend, Dean Chávez, for collateral, at [phone], but there was no answer. [BS]   1854 POCT glucose(!)  Patient was not hypoglycemic, not altered, no respiratory depression.  EKG shows sinus rhythm, regular rate, normal WA, QRS, QT intervals, no evidence of arrhythmia, no evidence of QT prolongation, no evidence of bradycardia to suggest beta-blocker overdose [BS]   2027 HCG, Quantitative  Patient had positive UPT but beta quant is negative, patient is not pregnant. [BS]   2234 Patient continues to show no signs of beta-blocker overdose.  Heart rate is in the 60s to 70s.  Blood pressure is within normal limits.  EKG without evidence of arrhythmia, QT prolongation.  Fingerstick glucose within normal limits.   Patient ambulates with steady gait, she has no signs of respiratory depression or altered mental status to suggest underlying intoxication.  Case discussed with Psychiatry, who recommended pec for suicidal behavior and pill ingestion.  Pec placed.  Patient is medically cleared for psychiatric placement. [BS]      ED Course User Index  [BS] Bo Caputo MD       Medically cleared for psychiatry placement: 10/21/2024 10:33 PM         MDM:    29-year-old female with past medical history of ADHD, hemiparesis, mood disorder, pseudoseizures, cerebral palsy, history of substance abuse presents back from psychiatric facility with concerns for low blood pressure.  Differential Diagnosis includes:  Toxidrome, beta-blocker overdose, symptomatic bradycardia, acute mi/ACS, arrhythmia, electrolyte abnormality.  Physical exam as noted above.  ED workup notable for repeat lab work CBC white blood cell count 7.23, hemoglobin 14.7, CMP with T bili 1.2, creatinine 0.8, glucose 130, magnesium 2, troponin negative, urinalysis unremarkable, UDS positive for amphetamines, lactate 0.8.  Patient presentation concerning for bradycardia and hypotension of unclear etiology, discussed with poison Center who reports that peaked reaction she had been 6-8 hours after ingestion at which point time patient's showed no evidence of bradycardia or hypotension.  This is likely complicated by recent methamphetamine use earlier that day.  Discussed further with Toxicology as well as despite additional IV fluid boluses patient's hypotension continued bradycardia continued.  Levophed started peripherally at a low dose, with bedside ultrasound showing what visually appears to be adequate EF.  Toxicology recommending atropine and calcium, avoiding glucagon at this point with diminished mental status.  She is arousable to physical stimuli at this point, appears to be protecting her airway with O2 sat on room air %.  Levophed switched for  epinephrine with some slight improvement, blood pressure normalizing, heart rate increasing slightly.  She will be admitted to Hospital Medicine team in the ICU for continued evaluation and management, consideration for high-dose insulin therapy.  Pec continued, transferred to the ICU in guarded condition.      Note was created using voice recognition software. Note may have occasional typographical or grammatical errors, garbled syntax, and other bizarre constructions that may not have been identified and edited despite good francisco initial review prior to signing.                     Clinical Impression:  Final diagnoses:  [T50.901A] Ingestion of unknown drug  [T14.91XA] Suicide attempt (Primary)  [R00.1] Bradycardia  [T50.901A] Overdose          ED Disposition Condition    Admit Stable                Frank Figueroa MD  10/24/24 0900

## 2024-10-22 NOTE — PATIENT INSTRUCTIONS
Thank you for allowing me to participate as part of your health care team, and thank you for choosing [facility].    MEGAN BOLDEN MD  Board Certified in Psychiatry & Addiction Medicine      IN CASE OF SUICIDAL THINKING, call the National Suicide Hotline Number: 988    988 Suicide & Crisis Lifeline: 988 , 0-615-870-TALK (8885)  [email or web address]           RECOMMENDATIONS & INSTRUCTIONS:     [x] Take all medication, from all providers, as prescribed.  [x] Follow with primary care provider for routine health maintenance and management of medical co-morbidities, as well as any indicated/needed specialists.  [x] If questions or concerns arise, or if experiencing side effects, adverse reactions or worsening symptoms, contact your provider through the [facility] portal at [email or web address], or call [phone] to reach the [facility] main line.  [x] In cases of emergencies, call [phone] or [phone], or present directly to the emergency department for immediate assistance.  [x] Avoid alcohol intake; findings now indicate that when it comes to alcohol consumption, there is no safe amount that does not affect health.  [x] Abstain from illicit drug use.  [x] Upon discharge from an emergency department or inpatient setting, it is recommended to follow up with an outpatient provider within 1-2 weeks of discharge, but ideally as soon as possible; additionally, if you currently follow with an outpatient provider, expeditiously contact them upon discharge to apprise them of the situation and receive further instructions.    ADDITIONAL RECOMMENDATIONS:  - successfully complete a licensed addiction rehabilitation program, and follow all aftercare instructions and recommendations  - routinely attend 12 step (or equivalent) mutual self-help meetings  - work with a sponsor  - establish sobriety and maintain a recovery lifestyle    INFORMATION ON MENTAL HEALTH MEDICATIONS:     National [place] of Mental Health:    https://www.nim.nih.gov/health/topics/mental-health-medications     Web MD:   https://www.webmd.NOZA       RESOURCES:     IN CASE OF SUICIDAL THINKING, call the National Suicide Hotline Number: 988    988 Suicide & Crisis Lifeline: 988 , 5-667-164-TALK (8255)  Provides 24/7, free and confidential support for people in distress, prevention and crisis resources for you or your loved ones, and best practices for professionals.    Call, text or chat.  https://988[employer].line.org     National Action [place] for Suicide Prevention: the National Action [place] for Suicide Prevention (Action Riga) is the nations public-private partnership for suicide prevention, working with more than 250 national partners.   https://theSave On Medicaliance.org     National Strategy for Suicide Prevention & Risk Mitigation:  https://theactionalliance.org/our-strategy/national-strategy-suicide-prevention     [x] Fact Sheet:   https://www.[facility].gov/sites/default/files/national-strategy-for-suicide-prevention-factsheet.pdf     [x] Report:   https://www.ncbi.nlm.nih.gov/books/OXJ381663/pdf/Bookshelf_NBK109917.pdf     Suicide Prevention Resource Center: The Suicide Prevention Resource Center (SPR) is the only federally supported resource center devoted to advancing the implementation of the National Strategy for Suicide Prevention. [facility] is funded by the U.S. Department of Health and Human Services' Substance Abuse and Mental Health Services Administration (SAMHSA).  https://www.[facility].org     [x] Safety Plan:   https://MyFuelUp.Nearpod.[employer].NOZA/wp-content/uploads/2021/08/[place]-Safety-Plan-8-6-21.pdf     [x] Suicide Risk Curve:  https://MyFuelUp.Ringly.NOZA/wp-content/uploads/2021/08/Gyuhbca-qrew-ieedu-8-6-21.pdf     [place] Mental Health Advocacy Service: the state agency tasked with protecting the legal rights of people with behavioral health diagnoses.  https://mhas.[place].gov     Alcoholics Anonymous (AA): find a meeting near  you.  https://www.aa.org     SMI Adviser: resources for individuals and families with serious mental illness.  https://smiadviser.org     National [place] for the Mentally Ill (ANGIE): the nation's largest grassroots organization dedicated to building better lives for individuals with mental illness.  https://www.angie.org/Home     U.S. Department of Health and Human Services (HHS): the mission of HHS is to enhance the health and well-being of all Americans, by providing for effective health and human services and by fostering sound, sustained advances in the sciences underlying medicine, public health, and .   https://www.[facility].gov     Substance Abuse and Mental Health Services Administration (SAMHSA): [facility] is the agency within [facility] that leads public health efforts to advance the behavioral health of the nation. [facility]'s mission is to reduce the impact of substance abuse and mental illness on [place]'s communities.   https://www.[facility].gov     National Institutes of Health (NIH): a part of [facility], [facility] is the largest biomedical research agency in the world.   https://www.nih.gov     National [place] on Drug Abuse (BERNICE): sponsored by the NIH, the mission of BERNICE is to advance science on drug use and addiction and to apply that knowledge to improve individual and public health.  https://bernice.nih.gov     National [place] on Alcohol Abuse and Alcoholism (NIAAA): sponsored by the NIH, the mission of NIAA is to generate and disseminate fundamental knowledge about the effects of alcohol on health and well-being, and apply that knowledge to improve diagnosis, prevention, and treatment of alcohol-related problems, including alcohol use disorder, across the lifespan.   https://www.niaaa.nih.gov     National Harm Reduction Coalition: resources for harm reduction, including techniques, strategies, policy, and advocacy.  https://harmreduction.org     The SHARE Approach - A Model for Shared Decision Making:  [x]  Fact Sheet  https://www.ahrq.gov/sites/default/files/publications/files/share-approach_factsheet.pdf     AMA Principles of Medical Ethics - Informed Consent & Shared Decision Making:  [x] Chapter  https://www.ama-assn.org/system/files/2019-06/code-of-medical-ycgyzi-uxdfhcv-1.pdf     Safety Netting for Primary Care:  [x] Article  https://www.ncbi.nlm.nih.gov/pmc/articles/UTB6773251/pdf/jvxdjau-9801--e70.pdf       MEDICATION MANAGEMENT:     [x] In addition to the potential beneficial effects, the use of any medication or drug (prescribed, over the counter or otherwise) carries with it the risk of potential adverse effects.  Each has a set of typical adverse effects - some common, some rare - but idiosyncratic and unanticipated reactions unique to you are always possible.      [x] It is important to remember that untreated illness can also pose a risk, which must be taken into account when weighing the pros and cons of a medication trial.    [x] Medications and drugs can sometimes interact with each other in the body, leading to adverse effects - it is important that all your providers know all the medications and drugs you take - prescribed, over the counter, or otherwise.  Keep all your practitioners up to date with any changes.  It's always a good idea to keep an up-to-date list in an easily accessible location.    [x] There is an inherent unpredictability to all treatment, including the use of medication.  Unexpected outcomes can occur - keep me up to date with any difficulties you encounter.    [x] It is important to take medication as directed, and to comply fully with the instructions.  Check with the appropriate provider first before adjusting or stopping your medication on your own.    If you require further information pertaining to the issues outlined above, please reach out to your providers through the [facility] portal at [email or web address], or call [phone] to discuss.  See resource list for  additional material.     Additional information can be provided pertaining to your diagnosis, intended outcomes, target symptoms for treatment, and possible benefits and risks of medication - you can also access this information through the provided resources.  Possible alternatives to the current treatment plan (including no treatment) can also be reviewed.      GENERAL HEALTH & WELLNESS:     [x] Establish and follow regularly with a primary care physician for routine health maintenance and management of any medical comorbidities.  [x] Follow a healthy diet, exercise routinely, and monitor weight and metabolic parameters.  [x] Allow adequate time for sleep and practice good sleep hygiene.  [x] Do not operate a motor vehicle or heavy machinery if the effects of medications or the symptoms underlying your condition impair the ability for you to do so safely.    Dietary Guidelines for Americans, 1989-4323:  U.S. Department of Agriculture (USDA)  https://www.dietaryguidelines.gov/sites/default/files/2020-12/Dietary_Guidelines_for_Americans_2020-2025.pdf#page=31     The Nutrition Source:  [employer]  https://www.\[facility] [place] [facility]\"".[place].[facility]/nutritionsource       SLEEP HYGIENE:     Follow these tips to establish healthy sleep habits:  [x] Keep a consistent sleep schedule. Get up at the same time every day, even on weekends or during vacations.  [x] Set a bedtime that is early enough for you to get at least 7-8 hours of sleep.  [x] Don't go to bed unless you are sleepy.  [x] If you don't fall asleep after 20 minutes, get out of bed. Go do a quiet activity without a lot of light exposure. It is especially important to not get on electronics.  [x] Establish a relaxing bedtime routine.  [x] Use your bed only for sleep and sex.  [x] Make your bedroom quiet and relaxing. Keep the room at a comfortable, cool temperature.  [x] Limit exposure to bright light in the evenings.  [x] Turn off electronic devices at least 30  minutes before bedtime.  [x] Don't eat a large meal before bedtime. If you are hungry at night, eat a light, healthy snack.  [x] Exercise regularly and maintain a healthy diet.  [x] Avoid consuming caffeine in the afternoon or evening.  [x] Avoid consuming alcohol before bedtime.  [x] Reduce your fluid intake before bedtime.    QUICK TIPS FOR BETTER SLEEP  Reduce smartphone usage Create and maintain a nightly ritual Avoid caffeine 4-6 hours before sleeping Don't eat or drink too much at bedtime Sleep at the same time every night        American Academy of Sleep Medicine - Healthy Sleep Habits:  https://sleepeducation.org/healthy-sleep/healthy-sleep-habits     American Academy of Sleep Medicine - Bedtime Calculator:  https://sleepeducation.org/healthy-sleep/bedtime-calculator     American Academy of Sleep Medicine - Cognitive Behavioral Therapy for Insomnia (CBT-I):  https://sleepeducation.org/patients/cognitive-behavioral-therapy     American Academy of Sleep Medicine - Insomnia:  https://sleepeducation.org/sleep-disorders/insomnia       ALCOHOL & DRUG USE COUNSELING:     Preventing Excessive Alcohol Use (CDC):  https://www.cdc.gov/alcohol/fact-sheets/moderate-drinking.htm#:~:text=To%20reduce%20the%20risk%20of,days%20when%20alcohol%20is%20consumed.     [x] Alcohol consumption is associated with a variety of short- and long-term health risks, including motor vehicle crashes, violence, sexual risk behaviors, high blood pressure, and various cancers (e.g., breast cancer).  [x] The risk of these harms increases with the amount of alcohol you drink. For some conditions, like some cancers, the risk increases even at very low levels of alcohol consumption (less than 1 drink).  [x] To reduce the risk of alcohol-related harms, the 7629-1879 Dietary Guidelines for Americans recommends that adults of legal drinking age can choose not to drink, or to drink in moderation by limiting intake to 2 drinks or less in a day for men or  1 drink or less in a day for women, on days when alcohol is consumed.  [x] The Guidelines also do not recommend that individuals who do not drink alcohol start drinking for any reason and that if adults of legal drinking age choose to drink alcoholic beverages, drinking less is better for health than drinking more.  [x] The Guidelines note that some people should not drink alcohol at all, such as:  - If they are pregnant or might be pregnant.  - If they are younger than age 21.  - If they have certain medical conditions or are taking certain medications that can interact with alcohol.  - If they are recovering from an alcohol use disorder or if they are unable to control the amount they drink.  [x] The Guidelines also note that not drinking alcohol is the safest option for women who are lactating. Generally, moderate consumption of alcoholic beverages by a woman who is lactating (up to 1 standard drink in a day) is not known to be harmful to the infant, especially if the woman waits at least 2 hours after a single drink before nursing or expressing breast milk. Women considering consuming alcohol during lactation should talk to their healthcare provider.  [x] The Guidelines note, Emerging evidence suggests that even drinking within the recommended limits may increase the overall risk of death from various causes, such as from several types of cancer and some forms of cardiovascular disease. Alcohol has been found to increase risk for cancer, and for some types of cancer, the risk increases even at low levels of alcohol consumption (less than 1 drink in a day).  [x] Although past studies have indicated that moderate alcohol consumption has protective health benefits (e.g., reducing risk of heart disease), recent studies show this not to be true.  [x] Its important to focus on the amount people drink on the days that they drink. Even if women consume an average of 1 drink per day or men consume an average of 2  "drinks per day, binge drinking increases the risk of experiencing alcohol-related harm in the short-term and in the future.    Drinking Levels Defined (NIAAA):  https://www.niaaa.nih.gov/alcohol-health/overview-alcohol-consumption/moderate-binge-drinking     Drinking in Moderation:  According to the "Dietary Guidelines for Americans 2701-9026, U.S. Department of Health and Human Services and U.S. Department of Agriculture, adults of legal drinking age can choose not to drink or to drink in moderation by limiting intake to 2 drinks or less in a day for men and 1 drink or less in a day for women, when alcohol is consumed. Drinking less is better for health than drinking more, and findings now indicate that when it comes to alcohol consumption, there is no safe amount that does not affect health.    Binge Drinking:  NIAAA defines binge drinking as a pattern of drinking alcohol that brings blood alcohol concentration (AMANDA) to 0.08 percent - or 0.08 grams of alcohol per deciliter - or higher.  For a typical adult, this pattern corresponds to consuming 5 or more drinks (male), or 4 or more drinks (female), in about 2 hours.    The Substance Abuse and Mental Health Services Administration (SAMHSA), which conducts the annual National Survey on Drug Use and Health (NSDUH), defines binge drinking as 5 or more alcoholic drinks for males or 4 or more alcoholic drinks for females on the same occasion (i.e., at the same time or within a couple of hours of each other) on at least 1 day in the past month.    Heavy Alcohol Use:  NIAAA defines heavy drinking as follows:  - For men, consuming more than 4 drinks on any day or more than 14 drinks per week.  - For women, consuming more than 3 drinks on any day or more than 7 drinks per week.     [facility] defines heavy alcohol use as binge drinking on 5 or more days in the past month.    Patterns of Drinking Associated with Alcohol Use Disorder:  Binge drinking and heavy alcohol use can " increase an individual's risk of alcohol use disorder.    Certain people should avoid alcohol completely, including those who:  - Plan to drive or operate machinery, or participate in activities that require skill, coordination, and alertness.  - Take certain over-the-counter or prescription medications.  - Have certain medical conditions.  - Are recovering from alcohol use disorder or are unable to control the amount that they drink.  - Are younger than age 21.  - Are pregnant or may become pregnant.    U.S. Standard Drink  12 oz beer   (5% ABV) 8 oz malt liquor   (7% ABV) 5 oz wine   (12% ABV) 1.5 oz 80-proof distilled spirit  (40% ABV)        Heroin use harm reduction:  1. Carry naloxone. When using heroin, make sure you have at least one dose of naloxone - the overdose reversal drug - and have it in plain view. Understand how to give it.  2. Try a small dose first. It is best to first try a small amount of the heroin to check the effect.  3. Dont use heroin alone. Always use heroin with someone else and take turns while using.    It is possible to overdose with heroin whether you are snorting, injecting or using it in another form.    Signs of an overdose or emergency:   - The person is awake but unable to talk.  - Their body is limp.  - Their breathing is shallow or slow or stopped.  - Their skin is pale, ashen or clammy/sweaty.  - They are unconscious.    In case of emergency, give naloxone. If you suspect the heroin may contain fentanyl, administer more than one dose. Seek medical help even if naloxone has been given. Call 911 for help.      ADDICTION & RECOVERY:     [x] Addiction is a chronic medical illness, and as such, requires treatment through the lifespan  [x] Individuals with a history of alcohol or drug use disorder should maintain sobriety and a recovery lifestyle, as failure to do so can lead to relapse and progression of illness  [x] As part of a recovery lifestyle, it is advised to routinely  attend mutual self-help groups (12 step or equivalent) and to work with a sponsor  [x] For those with a history of alcohol or drug use disorder, it is important that all members of your treatment team - regardless of specialty - are fully apprised of your history and recovery plan moving forward.    For those struggling with active addiction, [facility] is an intensive outpatient addiction rehabilitation program located at [facility] on [facility] - please call [phone] to speak with an  about enrolling in the program.      ADHD TREATMENT AND STIMULANT MEDICATIONS:     [facility] Prescription Stimulants Drug Facts  CMS Stimulant and Related Medications: Use in Adults  DANIS Drug Fact Sheets: Stimulants  FDA Drug Safety Communication: Stimulants  [facility] ADHD  WebMD ADHD Medications and Side Effects  [facility]: ADHD Medication      SHARED DECISION MAKING & INFORMED CONSENT:     Shared medical decision making and informed consent are the hallmark and bedrock of excellent clinical care.  During the encounter, shared medical decision making was employed and informed consent was obtained, to the degree possible, whenever feasible, appropriate and relevant. Those interventions are supplemented here with written materials, detailing the topics in more depth.       PSYCHOEDUCATION:     Psychoeducation pertaining to the following -     Diagnosis Etiology Disease Processes Natural Progression   Treatment Options Time Course Safety Netting Informed Consent   Intended Benefits of Medication Expectable Adverse Effects Target Symptoms for Treatment Alternatives to Current Treatment   Shared   Decision Making Risk Mitigation Strategies Harm Reduction Techniques Associated Bio-Med Complications     - can be further discussed and reviewed (you can also access additional information through the provided resources in this document).      Effective communication is essential in order to engage in  shared medical decision making.  If you had difficulty understanding anything during your encounter or in this supplementary document, please contact your providers through the [facility] portal at [email or web address] or call [phone].     Anju Dictionary  https://dictionary.anju.org/us       It can be easy to miss, forget, or misremember important important information that was discussed during the session - especially when you're stressed, upset, or don't feel well.  If you or a representative have any additional questions, concerns, or topics to discuss - please contact your providers through the [facility] portal at [email or web address] or call [phone].    Memory Loss  https://www.[facility].TapFwd/brain/memory-loss    Causes of Memory Loss  https://www.Songza/what-causes-memory-loss-4296196    Memory loss: When to seek help  https://www.[facility].org/diseases-conditions/alzheimers-disease/in-depth/memory-loss/art-00539219    Memory, Forgetfulness, and Aging: What's Normal and What's Not?  https://www.kenya.nih.gov/health/memory-forgetfulness-and-aging-whats-normal-and-whats-not    Depression and Memory Loss  https://www.[employer]/health/depression/depression-and-memory-loss    The Relationship Between Anxiety and Memory Loss  https://www.[employer]/academics/blog-posts/the-relationship-between-anxiety-and-memory-loss     PRESCRIPTION DRUG MANAGEMENT:     Prescription Drug Management entails the following:  [x] The review, recommendation, or consideration without recommendation of medications during the encounter.  [x] Discussion (to the extent possible) with the patient and/or other interested parties of the diagnosis, target symptoms, intended outcomes, and possible benefits and risks of medication, as well as alternatives (including no treatment), if not otherwise known or stated prior.  [x] Discussion (to the extent possible) with the patient and/or other interested parties of  possible expectable adverse effects of any proposed individual psychotropic agents, as well as the inherent unpredictability of treatment, if not otherwise known or stated prior.  [x] Informed consent is sought from the patient (and/or guardian/designated decision maker, if applicable) after a thorough discussion (to the extent possible) of the aforementioned points outlined above.  [x] The provision of counseling (to the extent possible) to the patient and/or other interested parties on the importance of full compliance with any prescribed medication, if not otherwise known or stated prior.    Information on psychotropic medication can be found at:   National [place] of Mental Health: Information on Mental Health Medications      RISK MITIGATION, HARM REDUCTION & SAFETY NETTING:     Risk Mitigation Strategies, Harm Reduction Techniques, and Safety Netting are important interventions that can reduce acute and chronic risk.  As such, opportunities were sought to incorporate psychoeducation and practical advice pertaining to these topics into the encounter, to the degree possible, whenever feasible, appropriate and relevant.  Those interventions are supplemented here with written materials, detailing the topics in more depth.       RISK MITIGATION STRATEGIES:     Risk mitigation strategies are used to reduce the likelihood of future episodes of suicide, homicide, violence, and/or other problematic behaviors (e.g. self-injurious, risky, addictive, compulsive, impulsive). The following are examples of risk mitigation strategies which you can employ in order to reduce your overall burden of risk.     [x] Treatment of underlying psychopathology driving acute and chronic risk to the extent possible.  [x] Use of self administered rating scales and journaling to assist in risk tracking.  [x] Exploration of protective factors to potentially counterbalance risk.  [x] Identification and avoidance of triggers and situations  that increase risk, including excessive alcohol and drug use.  [x] Timely follow up and ongoing treatment of mental health issues moving forward.  [x] Full compliance with medication regimen.  [x] A good working knowledge of your medication regimen, including specific instructions on the administration of the medications.  [x] Consultation with an appropriate medical provider prior to altering or deviating from these instructions on your own.  [x] Active involvement and participation of family and natural support wherever feasible and possible.  [x] Development and review of coping strategies that can be immediately deployed in times of acute crisis.  [x] Implementation of home safety practices and the removal/reduction of access to lethal means (including, but not limited to, firearms, certain types and quantities of medication, poisons, or other methods you may have contemplated or identified).  [x] Collaborative development of a written safety plan with your treatment team and loved ones that can be immediately referred to in times of acute crisis.  [x] Utilization of a safety contract to engage your treatment team and further assess/manage risk.  [x] A good working knowledge of how to access emergency treatment in times of acute crisis.  [x] Utilization of suicide hotlines number (988) and resources in times of crisis.    If you require further information pertaining to the issues outlined above, please reach out to your providers through the [facility] portal at [email or web address], or call [phone] to discuss.  See resource list for additional material.      SAFETY NETTING:     In healthcare, safety netting refers to the provision of information to help patients or carers identify the need to consult a health care professional if a health concern arises or changes.  The relevance of this advice is most obvious with chronic mental illnesses, as their dynamic nature, with symptoms and signs emerging at  different times and in different combinations, makes safety netting particularly important.  Specific safety net advice for you includes the following:    [x] The existence of uncertainty. Mental health diagnoses and conditions contain at least some degree of uncertainty - knowing this, you should feel empowered to reconsult if necessary.  [x] What exactly to look out for. Given the recognised risk of possible deterioration or the development of complications, you should become familiar with the specific clinical features (including red flags) to look out for.    [x] How exactly to seek further help. You should know how and where to seek further help if needed.  Make a plan in advance and keep it handy.  It's also a good idea to share the plan with your treatment providers and loved ones.  [x] What to expect about time course. Mental health diagnoses and conditions often have an expected time course, which is important information for you to know.  However, if your difficulties do not conform to this time line and concerns arise, do not delay seeking further medical advice.    If you require further information pertaining to the issues outlined above, please reach out to your providers through the [facility] portal at [email or web address], or call [phone] to discuss.  See resource list for additional material.      HARM REDUCTION:     Harm Reduction techniques are used in an effort to reduce negative consequences associated with risky and maladaptive behaviors, until cessation of the problematic behaviors can be established.  Harm reduction is best thought of as a journey and not a destination; it is not an endorsement of problematic behavior, but an acknowledgement and recognition of the step-by-step nature of recovery.      Although commonly employed in working with people who suffer with drug addiction, harm reduction can be more broadly applied to any problematic behavior.    Harm Reduction and Substance  Abuse:  [x] Incorporates a spectrum of strategies that includes safer use, managed use, abstinence, meeting people who use drugs where theyre at, and addressing conditions of use along with the use itself.  [x] Accepts, for better or worse, that licit and illicit drug use is part of our world and chooses to work to minimize its harmful effects rather than simply ignore or condemn them.  [x] Understands drug use as a complex, multi-faceted phenomenon that encompasses a continuum of behaviors from severe use to total abstinence, and acknowledges that some ways of using drugs are clearly safer than others.  [x] Calls for the non-judgmental, non-coercive provision of services and resources to people who use drugs and the communities in which they live in order to assist them in reducing attendant harm.  [x] Affirms people who use drugs themselves as the primary agents of reducing the harms of their drug use and seeks to empower them to share information and support each other in strategies which meet their actual conditions of use.  [x] Does not attempt to minimize or ignore the real and tragic harm and danger that can be associated with illicit drug use.  [x] Meets people where they are, but seeks to not leave them there.  [x] Examples of specific interventions include, but are not limited to, narcan (naloxone), medication assisted treatment, syringe access, overdose prevention, and safer drug use techniques.    Key Harm Reduction Strategies: Opioid Use Disorder  [x] Safe Injection Sites & Equipment  [x] Managed Use  [x] Syringe Exchange Programs  [x] Fentanyl Test Strips  [x] Pharmacotherapy/Medication Assisted Treatment  [x] Narcan  [x] Good Buddhism Laws  [x] Treatment Instead of custodial  [x] Diversion Programs  [x] Overdose Education  [x] Abstinence    Whether or not you struggle with substance abuse, any and all opportunities to employ harm reduction techniques to address difficult to change problematic  "behaviors should be sought and implemented - whenever and wherever feasible, relevant and applicable. Additionally, harm reduction techniques can be applied broadly, and are relevant for a multitude of situations - even those that do not involve problematic or maladaptive behaviors.     EXAMPLES OF HARM REDUCTION IN OTHER AREAS  SUN SCREEN SEAT BELTS SPEED LIMITS BIRTH CONTROL        If you require further information pertaining to the issues outlined above, please reach out to your providers through the [facility] portal at [email or web address], or call [phone] to discuss.  See resource list for additional material.      FIREARM SAFETY:     THE SIX BASIC GUN SAFETY RULES  There are six basic gun safety rules for gun owners to understand and practice at all times:  Treat all guns as if they are loaded. Always assume that a gun is loaded even if you think it is unloaded. Every time a gun is handled for any reason, check to see that it is unloaded. If you are unable to check a gun to see if it is unloaded, leave it alone and seek help from someone more knowledgeable about guns.  Keep the gun pointed in the safest possible direction. Always be aware of where a gun is pointing. A "safe direction" is one where an accidental discharge of the gun will not cause injury or damage. Only point a gun at an object you intend to shoot. Never point a gun toward yourself or another person.  Keep your finger off the trigger until you are ready to shoot. Always keep your finger off the trigger and outside the trigger guard until you are ready to shoot. Even though it may be comfortable to rest your finger on the trigger, it also is unsafe. If you are moving around with your finger on the trigger and stumble or fall, you could inadvertently pull the trigger. Sudden loud noises or movements can result in an accidental discharge because there is a natural tendency to tighten the muscles when startled. The trigger is for firing and " the handle is for handling.  Know your target, its surroundings and beyond. Check that the areas in front of and behind your target are safe before shooting. Be aware that if the bullet misses or completely passes through the target, it could strike a person or object. Identify the target and make sure it is what you intend to shoot. If you are in doubt, DON'T SHOOT! Never fire at a target that is only a movement, color, sound or unidentifiable shape. Be aware of all the people around you before you shoot.  Know how to properly operate your gun. It is important to become thoroughly familiar with your gun. You should know its mechanical characteristics including how to properly load, unload and clear a malfunction from your gun. Obviously, not all guns are mechanically the same. Never assume that what applies to one make or model is exactly applicable to another. You should direct questions regarding the operation of your gun to your firearms dealer, or contact the  directly.  Store your gun safely and securely to prevent unauthorized use. Guns and ammunition should be stored separately. When the gun is not in your hands, you must still think of safety. Use an approved firearms safety device on the gun, such as a trigger lock or cable lock, so it cannot be fired. Store it unloaded in a locked container, such as an approved lock box or a gun safe. Store your gun in a different location than the ammunition. For maximum safety you should use both a locking device and a storage container.    ADDITIONAL SAFETY POINTS  The six basic safety rules are the foundational rules for gun safety. However, there are additional safety points that must not be overlooked.  [x] Never handle a gun when you are in an emotional state such as anger or depression. Your judgment may be impaired. If you have acute or chronic suicidal ideation, a suicide plan, or suicidal intent, have firearms removed and your access restricted by a  "trusted loved one or other responsible individual or agency.  [x] Never shoot a gun in celebration (the Fourth of July or New Year's Inge, for example). Not only is this unsafe, but it is generally illegal. A bullet fired into the air will return to the ground with enough speed to cause injury or death.  [x] Do not shoot at water, flat or hard surfaces. The bullet can ricochet and hit someone or something other than the target.  [x] Hand your gun to someone only after you verify that it is unloaded and the cylinder or action is open. Take a gun from someone only after you verify that it is unloaded and the cylinder or action is open.  [x] Guns, alcohol and drugs don't mix. Alcohol and drugs can negatively affect judgment as well as physical coordination. Alcohol and any other substance likely to impair normal mental or physical functions should not be used before or while handling guns. Avoid handling and using your gun when you are taking medications that cause drowsiness or include a warning to not operate machinery while taking this drug.   [x] The loud noise from a fired gun can cause hearing damage, and the debris and hot gas that is often emitted can result in eye injury. Always wear ear and eye protection when shooting a gun.      GUNS AND CHILDREN - FIREARM OWNER RESPONSIBILITIES    You Cannot Be Too Careful with Children and Guns  [x] There is no such thing as being too careful with children and guns. Never assume that simply because a toddler may lack finger strength, they can't pull the trigger. A child's thumb has twice the strength of the other fingers. When a toddler's thumb "pushes" against a trigger, invariably the barrel of the gun is pointing directly at the child's face. NEVER leave a firearm lying around the house.  [x] Child safety precautions still apply even if you have no children or if your children have grown to adulthood and left home. A nephew, niece, neighbor's child or a grandchild may " "come to visit. Practice gun safety at all times.  [x] To prevent injury or death caused by improper storage of guns in a home where children are likely to be present, you should store all guns unloaded, lock them with a firearms safety device and store them in a locked container. Ammunition should be stored in a location separate from the gun.    Talking to Children About Guns  [x] Children are naturally curious about things they don't know about or think are "forbidden." When a child asks questions or begins to act out "gun play," you may want to address his or her curiosity by answering the questions as honestly and openly as possible. This will remove the mystery and reduce the natural curiosity. Also, it is important to remember to talk to children in a manner they can relate to and understand. This is very important, especially when teaching children about the difference between "real" and "make-believe." Let children know that, even though they may look the same, real guns are very different than toy guns. A real gun will hurt or kill someone who is shot.    Instill a Mind Set of Safety and Responsibility  [x] The American Academy of Pediatrics reports that adolescence is a highly vulnerable stage in life for teenagers struggling to develop traits of identity, independence and autonomy. Children, of course, are both naturally curious and innocently unaware of many dangers around them. Thus, adolescents as well as children may not be sufficiently safeguarded by cautionary words, however frequent. Contrary actions can completely undermine good advice. A "Do as I say and not as I do" approach to gun safety is both irresponsible and dangerous.  [x] Remember that actions speak louder than words. Children learn most by observing the adults around them. By practicing safe conduct you will also be teaching safe conduct.    Safety and Storage Devices  [x] If you decide to keep a firearm in your home you must consider " the issue of how to store the firearm in a safe and secure manner. There are a variety of safety and storage devices currently available to the public in a wide range of prices. Some devices are locking mechanisms designed to keep the firearm from being loaded or fired, but don't prevent the firearm from being handled or stolen. There are also locking storage containers that hold the firearm out of sight. For maximum safety you should use both a firearm safety device and a locking storage container to store your unloaded firearm.   Two of the most common locking mechanisms are trigger locks and cable locks. Trigger locks are typically two-piece devices that fit around the trigger and trigger guard to prevent access to the trigger. One side has a post that fits into a hole in the other side. They are locked by a key or combination locking mechanism. Cable locks typically work by looping a strong steel cable through the action of the firearm to block the firearm's operation and prevent accidental firing. However, neither trigger locks nor cable locks are designed to prevent access to the firearm.   [x] Smaller lock boxes and larger gun safes are two of the most common types of locking storage containers. One advantage of lock boxes and gun safes is that they are designed to completely prevent unintended handling and removal of a firearm. Lock boxes are generally constructed of sturdy, high-grade metal opened by either a key or combination lock. Gun safes are quite heavy, usually weighing at least 50 pounds. While gun safes are typically the most expensive firearm storage devices, they are generally more reliable and secure.     Remember: Safety and storage devices are only as secure as the precautions you take to protect the key or combination to the lock.    RULES FOR KIDS  Adults should be aware that a child could discover a gun when a parent or another adult is not present. This could happen in the child's own  home; the home of a neighbor, friend or relative; or in a public place such as a school or park. If this should happen, a child should know the following rules and be taught to practice them.   Stop  The first rule for a child to follow if he/she finds or sees a gun is to stop what he/she is doing.  Don't Touch!  The second rule is for a child not to touch a gun he/she finds or sees. A child may think the best thing to do if he/she finds a gun is to pick it up and take it to an adult. A child needs to know he/she should NEVER touch a gun he/she may find or see.  Leave the Area  The third rule is to immediately leave the area. This would include never taking a gun away from another child or trying to stop someone from using gun.  Tell an Adult  The last rule is for a child to tell an adult about the gun he/she has seen. This includes times when other kids are playing with or shooting a gun.     METHODS OF CHILDPROOFING YOUR FIREARM  As a responsible handgun owner, you must recognize the need and be aware of the methods of childproofing your handgun, whether or not you have children.  Whenever children could be around, whether your own, or a friend's, relative's or neighbor's, additional safety steps should be taken when storing firearms and ammunition in your home.  [x] Always store your firearm unloaded.  [x] Use a firearms safety device AND store the firearm in a locked container.  [x] Store the ammunition separately in a locked container.  Always storing your firearm securely is the best method of childproofing your firearm; however, your choice of a storage place can add another element of safety. Carefully choose the storage place in your home especially if children may be around.  [x] Do not store your firearm where it is visible.  [x] Do not store your firearm in a bedside table, under your mattress or pillow, or on a closet shelf.  [x] Do not store your firearm among your valuables (such as jewelry or  cameras) unless it is locked in a secure container.  [x] Consider storing firearms not possessed for self-defense in a safe and secure manner away from the home.    [employer] for Gun Safety:  https://www.everytown.org       Gun Violence: Prediction, Prevention and Policy  American Psychological Association Panel of Experts Report  https://www.apa.org/pubs/reports/gun-violence-report.pdf     If you require further information pertaining to any of the issues outlined above, please reach out to your providers through the [facility] portal at [email or web address], or call [phone] to discuss.  See resource list for additional material.      IN CASE OF SUICIDAL THINKING, call the [facility] Suicide Hotline Number: 988    988 Suicide & Crisis Lifeline: 988 , 8-481-843-TALK (8255)  Provides 24/7, free and confidential support for people in distress, prevention and crisis resources for you or your loved ones, and best practices for professionals.    Call, text or chat.  https://[place] [employer]           REFERRAL RECOMMENDATIONS FOR SUBSTANCE ABUSE & MENTAL HEALTH      IN CASE OF SUICIDAL THINKING, call the [employer] Suicide Hotline Number: 988    988 Suicide & Crisis Lifeline: 988 , 6-489-591-NRBV (8255)  https://[place] [employer]       SUBSTANCE ABUSE:     [facility] (formerly known as the [facility]BU)  [x] [phone], Option 2  [x] [address], [place] [address]  [x] [email or web address]  [x] [facility] delivers comprehensive and collaborative treatment for alcohol and substance use disorders.  Excellent program for working professionals or anyone else seeking recovery.  [x] Requires insurance approval prior to starting program, call number above for more information.  [x] Intensive Outpatient Rehabilitation Program - M-F 9am-3pm - daily groups with psychologists and social workers, sessions with MDs 3x per week   [x] Ambulatory detox  and dual diagnosis available      SUBOXONE:     NOTE: some Suboxone clinics require their clients to participate in a structured program (such as an IOP) in order to be prescribed Suboxone.  Some clinics have a long waiting list.  Most of these clinics do not accept walk-in clients, so call first to to learn what must be done to get started on Suboxone.    [facility] - [phone] (can do Sublocade)  [address], [place] [address]    [facility]  [address] [place], [place]  [phone]    [facility] - [phone] (can do Sublocade)  [address] [place] [address]    [facility]  [address] [place]  [phone]     [facility] (very short waiting list, may accept some walk-in's but call first if possible)  [address], [place] [id number] [phone]; [phone]    [facility]   [address] [place]    [phone]    [facility] (can usually be seen within a week but is cash only for appointment)  [address] [place], [place]    [facility] ([facility])  [address] [place]  [phone]    [facility] ([facility])  [address] [place] [id number] [phone]    [place], [place]:    [facility] - [address] [place] [address] - Tel: [phone]    Lisandro Alberts - [address] [place] [address] - Tel: [phone]    Mike Doty - [address] [place] [address] - Tel: [phone]    Chance Rojas - [address] [place] [address] - Tel: [phone]    James Saeed - [address] [place] [address] - Tel: [phone]    [place], [place]:     Dr. Gabriella Pierce and Dr. Austen Rossi - [address], [place] - Tel: [phone]    Dr. Clarita Arshad - [address] [place] [address] [place] - Tel: [phone]    Dr. Reynaldo Doe - Tel: [phone]    Dr. Jorge Holley [facility] - [phone]      METHADONE:     [facility] (the only  methadone clinic in the city, has two locations)  [x] [place] - [address] [place] [address], [phone]  [x] [facility] [place] [address], [phone]      12 STEP PROGRAMS (and similar):     Alcoholics Anonymous (local)  [x] [phone]  [x] www.[place].org for schedules for in-person and online meetings  [x] There are AA meetings throughout the day all over town  [x] AA costs nothing to attend; they pass a basket for donations but this is not required    Narcotics Anonymous  [x] [phone]  [x] www.noana.org  [x] There are NA meetings throughout the day all over [facility]  [x] NA costs nothing to attend; they pass a basket for donations but this is not required    Alcoholics Anonymous Online Intergroup (national)  [x] www.aa-intergroup.org  [x] Good resource for large, nation-wide meetings  [x] Can also attend smaller, local meetings in other cities  [x] Countless meetings all day and all night  [x] AA costs nothing to attend; they pass a basket for donations but this is not required    [employer] - 24/7 zoom meetings for women and coed - sign on anytime, anywhere!  [email or web address]    Online Intergroup of AA - [address] [facility] [place] Meeting - 24/7 zoom meetings  https://aa-intergroup.org/[email or web address]    LOOKING FOR AN ALTERNATIVE TO 12 STEP PROGRAMS - check out:  [facility]: https://www.smartrecovery.org/about-us  [facility]: [email or web address]      DETOX UNITS (USUALLY 5-7 DAYS):     [facility]: [address], [place]  [phone], call first to ensure bed availability    [facility]: [address] [place]  [phone], Option 1, call first to ensure bed availability    [facility]: [address], [place]  [phone] (intake by appointment only)    [facility]: [address], [place]  [phone]      INTENSIVE OUTPATIENT PROGRAMS:     [facility] (formerly known  as the [facility])  [x] [phone], Option 2  [x] [address] [place] [address], [facility] [address]  [x] [email or web address]  [x] [facility] delivers comprehensive and collaborative treatment for alcohol and substance use disorders.  Excellent program for working professionals or anyone else seeking recovery.  [x] Requires insurance approval prior to starting program, call number above for more information.  [x] Intensive Outpatient Rehabilitation Program - M-F 9am-3pm - daily groups with psychologists and social workers, sessions with MDs 3x per week   [x] Ambulatory detox and dual diagnosis available    [facility] Intensive Outpatient Program  [x] [phone]  [x] [facility] (the clinic not on [facility] main [facility])  [x] Call number above for more info and to check insurance requirements    [facility]  [address] [place] [address] [phone]    [facility]:  [address] [facility], [address] [place], [place] [address]?, [phone]  [address] [facility]?, [place], [place] [address]?, [phone]    RESIDENTIAL REHABS (USUALLY 28 DAYS):     [facility]: [address], [phone]    [facility]: [address] [place] [address] , [facility]  [phone] (intake by appointment only)    [facility] (men only) [address], [facility], [phone]    [facility] (Female only) [address] [facility], [phone]    [facility]: [address], [facility], men's program [phone], women's program [phone]    [facility]: [address], [facility], [phone]    [facility]: [address], [place], [place], [phone]    [facility]: Men only, [phone], [address] [place]    [facility]: [address], [place], [place], [phone]    [facility]: [address] [place] [address],  [phone]  New Location: [address] [place] [address]  [address], [place] [address], [phone]    [facility]:   ?[address]?[place], [place] [address]?[phone]    Luis Carlos: [address], [place] [address], [phone]    [place]: [place], [place], [phone]     [facility]: [place], [place], [phone]    [facility]: [place], [place], [phone]    [facility]: [place], [place], [phone]    B[place]: [place], [phone]    [facility]: [address] [facility], [place] [address], [phone]    COMMUNITY ADDICTION CLINICS:     [facility]: [address] [facility], [facility] B Mariam -[phone] -or- [address] [place] [address]    [facility] [place]: [address] [facility] [place]  [address]     [facility]: Clinics [phone]; Crisis [phone]    [facility]: [address] [place] [address]    [facility]: [address] [facility] [place] [address]    [facility] [place] [facility]: [address] [place] [address],    [facility]: 2nd Floor [address] [place] [address]    [facility]: [address], [place] [address]    [facility], [address], [place] [address]    [facility]: [address] [place] [address] [phone]  (serves youth 16-23 years old)    [facility] Center: [facility]/[place]/[facility] [phone]    [facility]: [address] [facility], [place] [phone]    [facility]: [address] [facility] [phone]    [facility]: [address] [facility], [address], [phone]     [facility]: [address] [facility] Jhonny Etienne, [phone], [phone]    RESOURCES IN OTHER [facility]:     [facility]: [facility]. [address]  [address] [place], [phone], [phone]    [facility]: [address] [facility], [address], [phone]    [facility], [address] [place] [address], [phone]    [facility]: [address] [facility], [phone]    [facility], [address] [facility], [phone] (State mental health facility)    [facility], [address] [facility], [phone] ([facility])    [facility], [address], [address], [place], [phone] ([facility], [facility])    [facility], [address] [facility], [phone] ([facility])    [facility] Hotline [phone], [phone]    [facility], [address] [facility], [facility], [address] [facility], [address], [facility]al [facility], [address] [facility] Behavioral [facility], 500 [facility]. [address]., [facility]al [facility], [address], [facility], [address], [place] [phone]    [facility], 302 [facility] [phone]    [facility] Recovery, [address] [facility], [phone]    [facility]. for Addiction Recovery, [address] [facility], [phone]      Armenian SPEAKING (en español):     Información de la reunión de Alcohólicos Anónimos  Aramis [facility], 10:00 am  Habla español  Esta reunión está abierta y cualquiera puede asistir.    [place] speaking Alcoholics anonymous meetings:  El "Aramis [place] AA Skype" es un aramis on line de Alcohólicos Anónimos en español. El aramis es arleth, gratuito y virtual a través de Skype Audio. El aramis funciona mediante messi llamada " grupal de voz, por lo que no se utiliza la videollamada, ni se pueden jose las imágenes o rostros de los participantes. Hace sofia años y medio abrimos el primer Aramis de AA por Juan José en [place], shawna actualmente asisten personas desde [place], [place], [place], [place], [place], [place], [place], [place], [place], [place], [place] y [place], entre otros.    El aramis es muy útil para los alcohólicos que residen en lugares donde no se celebran reuniones de AA, o residen en lugares donde las reuniones de AA son un número limitado de días a la semana, o para aquellos compañeros que se hayan de viaje o que, por cualquier motivo, se hayan convalecientes y no pueden desplazarse. Todos los días nos reuniones a las 21:00 (hora española)    Podéis obtener más información sobre el aramis y margarita sesiones en la página web [email or web address]      MENTAL HEALTH:     [facility] Out[facility]  [phone]    [facility] General Psychiatry Intensive Outpatient Program  [facility] (formerly known as the [facility])  [phone], option 3    [facility] - Dual Diagnosis Intensive Outpatient Program  [facility] (formerly known as the [facility])  [phone], option 2      [facility] MENTAL HEALTH CENTERS:     [facility]  (aka [facility], [facility])  Serves [facility], and [facility] residents.  Serves uninsured patients & those with Medicaid.  Main location: [address] [place] [address]  [phone]  Walk-in's available during regular business hours.  24/7 Crisis Line: [phone]    [facility] Services [facility]  (aka [facility], [facility])  Serves [place] residents.  Serves uninsured patients, those with Medicaid and some private plans.  Walk-in's available during regular business hours.  Primary care services  available as well.  [facility]: [address] [facility] [address] [place] [address];  [phone]  [employer]: [address] [place] [address];  [phone]  24/7 Crisis Line: [phone]    [facility]  Serves uninsured patients & those with Medicaid, call for more info.  Primary care, pediatrics, HIV treatment, and dentistry services available as well.  Three locations.  [phone]    [facility] [place]?Corporate Office  Serves patients with Medicaid, Medicare, and private insurance  [address]  [place] [address]  [phone]    [facility]  Serves uninsured on a sliding scale, as well as Medicaid, Medicare, and private plans.  Eight locations around the [facility].  [phone]    [facility]  Serves uninsured patients & those with Medicaid, private insurances.  Primary care available as well.  [phone]  [address] [place] [address]    [facility]  Serves veterans who were honorably discharged.  [address] [place] [address]  [phone]  24/7 Veterans Crisis Line: [phone] (Press 1)    If you have private insurance and need to find a specialist, please contact your insurance network to request a list of providers covered by your benefits.      MENTAL HEALTH/ADDICTIVE DISORDERS:     AA ([phone]), NA ([phone])   National Suicide Prevention Lifeline- Call 1-420.258.9924 Available 24 hours everyday  [facility] [phone]; Crisis Line [phone] - Call for options A-F:  Intensive Outpatient Treatment/ Day programs   [facility], please contact   [facility], please contact [phone] or [phone] to speak with an admissions counselor.  [facility] (Methadone Maintenance)   [address] [place] [address], [phone]  [address] [place] [address] [phone]  [facility], [address] [place] [address], [phone]  [place] [facility] [phone]  [facility] please contact [phone]  [facility], [address], 4th [place] [address] Phone: [phone]   [facility]  [place] Office: [address] [place] [address], [phone]  [place] Office: [address] [facility], [address], [place] [address], [phone]  [place] Office: [address] [place], [place] [address] [phone]    Outpatient Substance Abuse Treatment   [facility] (Methadone Maintenance)   [address] [place] [address], [phone]  [address] [place] [address] [phone]  [facility], [address] [place] [address], [phone]  [facility]cer  [place] Office: [address] [place] [address], [phone]  [place] Office: [address] [facility], [address], [place] [address], [phone]  [place] Office: [address] [place] [address] [phone]  [place] [facility], [address] [place] [address] [phone]   [facility] for Addiction Recovery, [address] [facility], [address], [phone]  [facility], [address] [place], [place] [phone]    Residential Substance Abuse Treatment   [facility] [address] [facility], [phone]  [facility], [address] [facility], [phone]  [facility] (men only) [address] [place] [address], [place] [address], [phone]  Women at the [facility] (women only) [address] [place] [address] [phone]  [facility], [address] [place] [address] [phone]  formerly [facility] (women only), intakes at 4[address] [facility], [phone]  [facility] (7-day program, $100, [address]ion[address], [phone], [phone], [phone])  [facility]  (Men only, [phone]), [address] (Vets*/Non-Vets)  [facility] (Men only, $400/month program fee) [address], [phone]  [facility] (Women over age 39 only), [address] [facility], [place], [phone]    Out of Area:    [place], [address], [place], [place] ([phone])  [facility] Recovery Program (men only), [address] [facility] [place] [address], [phone]  [facility], [address] [place], [place] ([phone])  [place], [facility] [place], [place] ([phone])  [facility], 111 [facility], [phone]  [facility] (Women only, has to have mental illness, can be homeless or substance abuser), [phone]        DOMESTIC VIOLENCE RESOURCES:     Advocacy  [facility] (NOF[facility])  [address] [address]    [facility] ? [phone]  Services provided: emergency shelter, individual advocacy, information and referrals, group support, children's program, medical advocacy, forensic medical exams, primary care, legal assistance, counseling, safety planning, and caregiver support    [facility] HEALING AND EMPOWERMENT [place]  Confidential location  [facility] ? [phone]  Services provided: short term emergency shelter, all services provided are free of charge    [facility] CENTERS FOR COMMUNITY ADVOCACY  Multiple locations in [place], [facility], [place], [facility], [place], and [facility] ([place], Yo, and [place])    [place] ? [phone]  Services provided: emergency shelter, individual advocacy, information and referrals, group support, children's program, medical advocacy, legal assistance in obtaining restraining orders, counseling, safety planning, and caregiver support    [facility]   Emergency Shelter   [phone]  Emergency Services ,Legal and Financial Assistance Services ,Housing Services ,Support Services      [place] [facility] home   [phone]  Emergency Services ,Counseling Services , Housing Services ,Support Services ,Children's Services     WOMEN WITH A VISION  [address] [place] [address]  WWAV ? [phone]  Services provided: advocacy, health education and supportive services, specializing in free healing services for marginalized groups, including LGBTQ individuals and sex workers    SEXUAL TRAUMA AWARENESS AND RESPONSE (STAR)  [address] [place] [address]    STAR ? (062) 827-STAR  Services provided: individual advocacy, information and referrals, group support, medical advocacy, legal assistance, counseling, and safety planning for survivors of sexual assault    [facility] ([facility])  [address] [place] [address]  [facility] Forensic Program ? [phone]  Services provided: free forensic medical exams for sexual assault and domestic violence, which can be performed up to 5 days after an incident. It is not necessary to make a police report to receive a forensic medical exam    Legal  [facility]  [address] [address]  [facility] ? [phone]  Services provided: free emergency legal representation for survivors of doemstic violence residing in [facility]. Legal services may include temporary restraining orders, temporary child support, custody, and use of property    [facility] LEGAL SERVICES (LS)  [address] [address]  SLLS ? [phone]  Services provided: free legal representation for survivors of domestic violence residing in [facility]. Legal services may include temporary child support, custody, and divorce      HOTLINES:     [facility] DOMESTIC VIOLENCE HOTLINE  [phone]    Services provided: free and confidential hotline for victims and survivors of domestic violence. All calls will be routed to a domestic violence service provided in the  victim or survivor's area    NATIONAL HUMAN TRAFFICKING HOTLINE  ([phone]    Services provided: national anti-trafficking hotline serving victims and survivors of human trafficking. Provides information about local resources, and access to safe space to report tips, seek services, and ask for help    VIA LINK  211 or [phone]    Service provided: counselors can provide crisis counseling. Counselors can also provide information and referrals to programs which can help with needs such as food, shelter, medical care, financial assistance, mental health services, substance abuse treatment, senior services, childcare, and more      HOMELESS SHELTERS:      Homeless shelters  [facility]  Emergency shelter for individuals and families  [address] [facility]  [phone]  [facility]  Emergency shelter for men only  Meals daily 6am, 2pm, & 6pm  Clothing, case management M-F by appointment (ID/job/housing/legal assistance), mail  [address] [facility]  [phone]  [facility]  Emergency shelter for men  [address] Tricia [facility]  [phone]  Emergency shelter for women  [address] [facility]  [phone]  Breakfast & lunch daily, dinner M-F  Case management, job counseling services   [facility]  Emergency shelter for teens and young adults up to 22yo  [address] [facility]  [phone]  [place] [facility]  Emergency shelter for women over 17yo and their kids  [address] [place] [address]  [phone]  [facility]  Day program, meals M-F 1PM (arrive early)  Showers, laundry, hygiene kits, showers, phones, , notary services, case management, ID assistance  [address] [facility]  [phone] M-F 8am-2:30pm  [facility]  Day program  MTWF 7:30am-3:30pm,  8:30am-3:30pm  Crisis intervention, employment assistance, food/clothing, hygiene kits, bus tokens, mail  [address] [facility] B  [phone]  [facility]  Mobile outreach for homeless persons in  [facility]  [phone]  Healthcare for the Homeless  Primary healthcare, case management, dental services, TB placement  Call ahead  [address]  2nd Floor  [phone]  Sarah at the [facility]  Connects homeless people with their loved ones in other cities by providing transportation costs   [phone]      [place] RESOURCES:     [place] Mobile Mental Health Crisis Response Team:    Region 12 ([place], [place], [place], and [facility]) ([facility])  [phone]      Outpatient Mental Health & Addiction Clinic Resources for both [facility]:    [facility] Resources  Website: www.[facility].org  Main Number: 009-617-4591    [facility] ([facility] Area)  [address] ([address]) [place] [address]  [phone]    [facility])  [address] ([address] [facility]) [place] [address]  [phone]    [facility]  [address] ([address]) [place] [address]  [phone]    [facility]  [address] ([address] [facility] [place] [address]  [phone]      Addiction Treatment Resources for both [facility]:    [facility] (Detox, Residential, PHP, IOP, and Aftercare Programs)  [address] [place] [address]  [phone]    [facility] (Residential, IOP, Transitional Living, and Aftercare Programs)  [address] [place] [address]  [phone]    [facility] (Inpatient, Residential, Detox, IOP, Outpatient, and Aftercare Programs)  [address] [facility], [place] [address] [phone] or toll free at [phone]      Outpatient Mental Health Psychotherapy Resources for both [facility]:    Felisa Mckeon, \[facility] [place] [facility]\""W  [address]  [place] [address]  [phone]  Specialties: Depression, Anxiety, and Life Transitions    Inga Beckham, PhD  [address]  [place] [address]  [phone]  Specialties: Testing and Evaluation, Education and Learning Disabilities, and ADHD    Dee Carter, [facility] [address] [place] [address]  [phone]  Specialties: Obsessive-Compulsive (OCD), Depression, and Relationship Issues    Romelia Hahn [facility] [address] [facility] [place] [address]  [phone]  Specialties: Trauma & PTSD, Mood Disorders, and Anxiety    Romelia Price, PhD, [facility]  [facility] [address] [place] [address]  [phone]  Specialties: Family Conflict, Child, and Relationship Issues    Renae Gutierrez [facility] Counseling [place] [address] [phone]  Specialties: Anxiety, Depression, and Anger Management        IN CASE OF SUICIDAL THINKING, call the National Suicide Hotline Number: 988    988 Suicide & Crisis Lifeline: 988 , 5-185-728-TALK (8255)  Provides 24/7, free and confidential support for people in distress, prevention and crisis resources for you or your loved ones, and best practices for professionals.    Call, text or chat.  https://988Local.comline.org

## 2024-10-22 NOTE — SUBJECTIVE & OBJECTIVE
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    Herpes simplex virus (HSV) infection     Intentional overdose of beta-adrenergic blocking drug 10/22/2024    Mood disorder 07/25/2013    PCOS (polycystic ovarian syndrome)     as per pt    Personality disorder 09/30/2012    Pseudoseizures 07/29/2013    Seizures 2010    Self-harming behavior     Substance abuse        Past Surgical History:   Procedure Laterality Date    FOOT TENDON SURGERY         Review of patient's allergies indicates:   Allergen Reactions    Fish containing products Swelling       Family History       Problem Relation (Age of Onset)    No Known Problems Mother, Father          Tobacco Use    Smoking status: Former     Current packs/day: 0.50     Average packs/day: 0.5 packs/day for 6.0 years (3.0 ttl pk-yrs)     Types: Vaping w/o nicotine, Cigarettes    Smokeless tobacco: Never    Tobacco comments:     marijuana and cigarettes   Substance and Sexual Activity    Alcohol use: Not Currently     Comment: heavily    Drug use: Yes     Types: Methamphetamines, Marijuana     Comment: 4 months    Sexual activity: Yes     Partners: Male     Birth control/protection: None     Comment: condom during outbreak         Review of Systems   Unable to perform ROS: Mental status change     Objective:     Vital Signs (Most Recent):  Temp: (P) 97.6 °F (36.4 °C) (10/22/24 1327)  Pulse: 63 (10/22/24 1327)  Resp: 15 (10/22/24 1327)  BP: 124/79 (10/22/24 1327)  SpO2: 100 % (10/22/24 1327) Vital Signs (24h Range):  Temp:  [97.6 °F (36.4 °C)-98.5 °F (36.9 °C)] (P) 97.6 °F (36.4 °C)  Pulse:  [38-76] 63  Resp:  [10-49] 15  SpO2:  [98 %-100 %] 100 %  BP: ()/(37-95) 124/79     Weight: 72.6 kg (160 lb)  Body mass index is  25.82 kg/m².      Intake/Output Summary (Last 24 hours) at 10/22/2024 1423  Last data filed at 10/22/2024 1042  Gross per 24 hour   Intake 1000 ml   Output --   Net 1000 ml        Physical Exam  Vitals reviewed.   Constitutional:       Appearance: She is ill-appearing.   HENT:      Head: Normocephalic and atraumatic.      Mouth/Throat:      Mouth: Mucous membranes are moist.   Eyes:      Extraocular Movements: Extraocular movements intact.      Pupils: Pupils are equal, round, and reactive to light.   Cardiovascular:      Rate and Rhythm: Regular rhythm. Bradycardia present.      Pulses: Normal pulses.   Pulmonary:      Effort: Pulmonary effort is normal. No respiratory distress.      Breath sounds: No wheezing or rhonchi.   Abdominal:      General: Abdomen is flat.      Palpations: Abdomen is soft.   Musculoskeletal:      Right lower leg: No edema.      Left lower leg: No edema.   Skin:     General: Skin is warm and dry.      Capillary Refill: Capillary refill takes less than 2 seconds.   Neurological:      Mental Status: She is disoriented.      Motor: No weakness.   Psychiatric:      Comments: Unable to assess 2/2 MS changes          Vents:       Lines/Drains/Airways       Peripheral Intravenous Line  Duration                  Peripheral IV - Single Lumen 10/22/24 0949 20 G Right Antecubital <1 day         Peripheral IV - Single Lumen 10/22/24 1115 20 G Posterior;Right Wrist <1 day         Peripheral IV - Single Lumen 10/22/24 1342 20 G Anterior;Left Upper Arm <1 day                    Significant Labs:    CBC/Anemia Profile:  Recent Labs   Lab 10/21/24  1830 10/22/24  1336   WBC 11.39 7.23   HGB 15.3 14.7   HCT 44.5 44.4    301   MCV 92 95   RDW 11.8 12.0        Chemistries:  Recent Labs   Lab 10/21/24  1830 10/22/24  1336    141   K 4.2 4.7    111*   CO2 24 24   BUN 15 10   CREATININE 0.9 0.8   CALCIUM 10.2 10.4   ALBUMIN 4.9 4.3   PROT 8.5* 7.6   BILITOT 0.8 1.2*   ALKPHOS 63 55   ALT 43 43  "  AST 46* 37   MG  --  2.0   PHOS  --  2.7     UDS + amphetamines  ABGs: No results for input(s): "PH", "PCO2", "HCO3", "POCSATURATED", "BE" in the last 48 hours.  All pertinent labs within the past 24 hours have been reviewed.    Significant Imaging:   I have reviewed all pertinent imaging results/findings within the past 24 hours.  CXR: I have reviewed all pertinent results/findings within the past 24 hours and my personal findings are:  CXR normal lungs bialterally  Tely shows sinus bradycardia  "

## 2024-10-22 NOTE — ASSESSMENT & PLAN NOTE
Continue PEC. Psych note reviewed. Doubt any additional ingested substances given the patient clear and open voluntary information provided to triage nurse but will monitor.

## 2024-10-22 NOTE — PLAN OF CARE
Case Management Assessment     PCP: Elijah Beck  Pharmacy: [facility]    Patient Arrived From: home   Existing Help at Home: mother    Barriers to Discharge: none    Discharge Plan:    A. Home with family   B. Home with family     10/22/24 1140   Discharge Assessment   Assessment Type Discharge Planning Assessment   Confirmed/corrected address, phone number and insurance Yes   Confirmed Demographics Correct on Facesheet   Source of Information patient   Communicated YOVANNY with patient/caregiver Date not available/Unable to determine   People in Home child(valerie), dependent;parent(s)   Do you expect to return to your current living situation? Yes   Do you have help at home or someone to help you manage your care at home? No   Prior to hospitilization cognitive status: Alert/Oriented   Current cognitive status: Alert/Oriented   Walking or Climbing Stairs Difficulty no   Dressing/Bathing Difficulty no   Equipment Currently Used at Home none   Readmission within 30 days? No   Patient currently being followed by outpatient case management? No   Do you currently have service(s) that help you manage your care at home? No   Do you take prescription medications? Yes   Do you have prescription coverage? Yes   Coverage Medicaid   Do you have any problems affording any of your prescribed medications? No   Is the patient taking medications as prescribed? yes   Who is going to help you get home at discharge? MarissamikeyKitty (Mother)  [phone] (Mobile)   How do you get to doctors appointments? car, drives self   Are you on dialysis? No   Do you take coumadin? No   Discharge Plan A Home with family   Discharge Plan B Home with family   DME Needed Upon Discharge  none   Discharge Plan discussed with: Patient   Transition of Care Barriers None   OTHER   Name(s) of People in Home MarybelKitty (Mother)  [phone] (Mobile)

## 2024-10-22 NOTE — CONSULTS
[facility] - Intensive Care  Critical Care Medicine  Consult Note    Patient Name: Michela No  MRN: [id number]  Admission Date: 10/21/2024  Hospital Length of Stay: 0 days  Code Status: Full Code  Attending Physician: No att. providers found   Primary Care Provider: Elijah Beck MD   Principal Problem: Intentional overdose of beta-adrenergic blocking drug    Inpatient consult to Critical Care Medicine  Consult performed by: Wm Saul MD  Consult ordered by: David Nieves MD        Subjective:     HPI:  28 yo female with h/o polysubstance abuse who presents to ER with SA with ingestion of approximately 80 20mg propranolol pills. Ingestion occurred yesterday afternoon. Patient reported emesis after ingestion on purpose. Also reported methamphetamine use to triage to nurse. Patient became bradycardic and hypotensive this AM over 12 hours after ingestion. She was given atropine, Ca and switched from levophed to epinephrine. She was progressive increased drowsiness. She is transferred to ICU. Her HR is varying from 45-65 with adequate BP on low dose epinephrine. POCUS shows normal LV contractility on parasternal long and apical 4 chamber views. She is sleeping but awakens during POCUS and resisting exam purposefully but not able to verbalize and cannot give any history. Chart reviewed. Case discussed with hospitalist in detail.    Hospital/ICU Course:  No notes on file    Past Medical History:   Diagnosis Date    ADHD (attention deficit hyperactivity disorder)     Allergy     Anxiety     Borderline personality     Cerebral palsy with spastic/ataxic diplegia     Depression     Genital herpes 02/15/2015    Headache(784.0)     Hemiparesis     since infancy due to shaking.    Herpes simplex virus (HSV) infection     Intentional overdose of beta-adrenergic blocking drug 10/22/2024    Mood disorder 07/25/2013    PCOS (polycystic ovarian syndrome)     as per pt    Personality disorder 09/30/2012     Pseudoseizures 07/29/2013    Seizures 2010    Self-harming behavior     Substance abuse        Past Surgical History:   Procedure Laterality Date    FOOT TENDON SURGERY         Review of patient's allergies indicates:   Allergen Reactions    Fish containing products Swelling       Family History       Problem Relation (Age of Onset)    No Known Problems Mother, Father          Tobacco Use    Smoking status: Former     Current packs/day: 0.50     Average packs/day: 0.5 packs/day for 6.0 years (3.0 ttl pk-yrs)     Types: Vaping w/o nicotine, Cigarettes    Smokeless tobacco: Never    Tobacco comments:     marijuana and cigarettes   Substance and Sexual Activity    Alcohol use: Not Currently     Comment: heavily    Drug use: Yes     Types: Methamphetamines, Marijuana     Comment: 4 months    Sexual activity: Yes     Partners: Male     Birth control/protection: None     Comment: condom during outbreak         Review of Systems   Unable to perform ROS: Mental status change     Objective:     Vital Signs (Most Recent):  Temp: (P) 97.6 °F (36.4 °C) (10/22/24 1327)  Pulse: 63 (10/22/24 1327)  Resp: 15 (10/22/24 1327)  BP: 124/79 (10/22/24 1327)  SpO2: 100 % (10/22/24 1327) Vital Signs (24h Range):  Temp:  [97.6 °F (36.4 °C)-98.5 °F (36.9 °C)] (P) 97.6 °F (36.4 °C)  Pulse:  [38-76] 63  Resp:  [10-49] 15  SpO2:  [98 %-100 %] 100 %  BP: ()/(37-95) 124/79     Weight: 72.6 kg (160 lb)  Body mass index is 25.82 kg/m².      Intake/Output Summary (Last 24 hours) at 10/22/2024 1423  Last data filed at 10/22/2024 1042  Gross per 24 hour   Intake 1000 ml   Output --   Net 1000 ml        Physical Exam  Vitals reviewed.   Constitutional:       Appearance: She is ill-appearing.   HENT:      Head: Normocephalic and atraumatic.      Mouth/Throat:      Mouth: Mucous membranes are moist.   Eyes:      Extraocular Movements: Extraocular movements intact.      Pupils: Pupils are equal, round, and reactive to light.   Cardiovascular:       "Rate and Rhythm: Regular rhythm. Bradycardia present.      Pulses: Normal pulses.   Pulmonary:      Effort: Pulmonary effort is normal. No respiratory distress.      Breath sounds: No wheezing or rhonchi.   Abdominal:      General: Abdomen is flat.      Palpations: Abdomen is soft.   Musculoskeletal:      Right lower leg: No edema.      Left lower leg: No edema.   Skin:     General: Skin is warm and dry.      Capillary Refill: Capillary refill takes less than 2 seconds.   Neurological:      Mental Status: She is disoriented.      Motor: No weakness.   Psychiatric:      Comments: Unable to assess 2/2 MS changes          Vents:       Lines/Drains/Airways       Peripheral Intravenous Line  Duration                  Peripheral IV - Single Lumen 10/22/24 0949 20 G Right Antecubital <1 day         Peripheral IV - Single Lumen 10/22/24 1115 20 G Posterior;Right Wrist <1 day         Peripheral IV - Single Lumen 10/22/24 1342 20 G Anterior;Left Upper Arm <1 day                    Significant Labs:    CBC/Anemia Profile:  Recent Labs   Lab 10/21/24  1830 10/22/24  1336   WBC 11.39 7.23   HGB 15.3 14.7   HCT 44.5 44.4    301   MCV 92 95   RDW 11.8 12.0        Chemistries:  Recent Labs   Lab 10/21/24  1830 10/22/24  1336    141   K 4.2 4.7    111*   CO2 24 24   BUN 15 10   CREATININE 0.9 0.8   CALCIUM 10.2 10.4   ALBUMIN 4.9 4.3   PROT 8.5* 7.6   BILITOT 0.8 1.2*   ALKPHOS 63 55   ALT 43 43   AST 46* 37   MG  --  2.0   PHOS  --  2.7     UDS + amphetamines  ABGs: No results for input(s): "PH", "PCO2", "HCO3", "POCSATURATED", "BE" in the last 48 hours.  All pertinent labs within the past 24 hours have been reviewed.    Significant Imaging:   I have reviewed all pertinent imaging results/findings within the past 24 hours.  CXR: I have reviewed all pertinent results/findings within the past 24 hours and my personal findings are:  CXR normal lungs bialterally  Tely shows sinus bradycardia    ABG  No results for " "input(s): "PH", "PO2", "PCO2", "HCO3", "BE" in the last 168 hours.  Assessment/Plan:     Psychiatric  * Intentional overdose of beta-adrenergic blocking drug  Toxicology notes reviewed. Currently compensating well with epi alone at low dose. Will hold off on high dose insulin therapy for now especially with normal EF, BP and HR on low dose epi. Will place PICC now to facilitate if needed.    Suicide attempt  Continue PEC. Psych note reviewed. Doubt any additional ingested substances given the patient clear and open voluntary information provided to triage nurse but will monitor.    Polysubstance abuse  Pt admitted to methamphetamine use but no opiods- possible cause of delayed decompensation from propanolol ingestion.    Cardiac/Vascular  Cardiogenic shock  This patient has shock. The type of shock is cardiogenic due to cardiotoxicity. The patient has the following evidence of shock: persistent hypotension. The patient will be admitted to an intensive care unit, they will be treated with epinephrine.  -will trend lactate and mixed venous once PICC placed.  -avoid further IVF as primary issue will be contractility related and not pre-load        Critical Care Time: 35 minutes  Critical secondary to Patient has a condition that poses threat to life and bodily function: Psychiatric Illness with threat to self and others and Cardiogenic shock  Patient has an abrupt change in neurologic status: AMS 2/2 drug overdose  Patient is currently on drug therapy requiring intensive monitoring for toxicity: Epinephrine for beta blcoker overdose     Critical care was time spent personally by me on the following activities: development of treatment plan with patient or surrogate and bedside caregivers, discussions with consultants, evaluation of patient's response to treatment, examination of patient, ordering and performing treatments and interventions, ordering and review of laboratory studies, ordering and review of radiographic " studies, pulse oximetry, re-evaluation of patient's condition. This critical care time did not overlap with that of any other provider or involve time for any procedures.    Thank you for your consult. I will follow-up with patient. Please contact us if you have any additional questions.     Wm Saul MD  Critical Care Medicine  [facility] [place] - Intensive Care

## 2024-10-22 NOTE — HPI
28 yo female with h/o polysubstance abuse who presents to ER with SA with ingestion of approximately 80 20mg propranolol pills. Ingestion occurred yesterday afternoon. Patient reported emesis after ingestion on purpose. Also reported methamphetamine use to triage to nurse. Patient became bradycardic and hypotensive this AM over 12 hours after ingestion. She was given atropine, Ca and switched from levophed to epinephrine. She was progressive increased drowsiness. She is transferred to ICU. Her HR is varying from 45-65 with adequate BP on low dose epinephrine. POCUS shows normal LV contractility on parasternal long and apical 4 chamber views. She is sleeping but awakens during POCUS and resisting exam purposefully but not able to verbalize and cannot give any history. Chart reviewed. Case discussed with hospitalist in detail.

## 2024-10-22 NOTE — NURSING
Pt arrive to the unit by stretcher, accompanied by transport and safety sitter.  Assisted pt to bed. Tele monitoring initiated.  Admit assessment initiated..  NO distress noted.

## 2024-10-22 NOTE — TELEMEDICINE CONSULT
"[facility]  TELEMEDICINE/INTERPROFESSIONAL E- Consultation Note  10/22/2024  11:21 AM      MRN: [id number]  Admission Date: 10/21/2024  Hospital Length of Stay: 0 days  Attending Physician: No att. providers found   Primary Care Provider: Elijah Beck MD   CONSULTING TEAM: Medical Toxicology    RECOMMENDATIONS     Suspected beta adrenergic antagonist toxicity  Given poor mental status - would trial atropine 0.5 mg x 1  Expecting this not to work, at least for long, would then trial calcium gluconate  Again, expecting calcium gluconate not to work at least long term, would move towards vasopressors  If vasoplegia the primary concern (primarily hypotension) norepinephrine is a good choice  However - if only beta adrenergic antagonist toxicity is primary concern, epinephrine may be better choice due to increase beta 1 activity  Vasopressor choices can be guided by IVC and cardiac POCUS, keeping in mind that typically beta adrenergic antagonist toxicity would primarily affect cardiac contractility first thus epinephrine may be the more useful vasopressor (in comparison to norepinephrine, which may be more useful in vasoplegia)  If cardiogenic shock with poor EF where decreased inotropy is the primary concern, there is evidence to support "high does insulin euglycemic therapy" HIET - which would allow for increased inotropic effects, essentially acting as a inotropic vasopressor by increased the utilization of glucose at the cardiac myocytes, decreased SVR, increased cardiac output, enhancing local myocardial perfusion and does NOT rely on systemic catecholamines so would be unaffected by a beta adrenergic blockade  High dose insulin should be initiated at 1 unit / kg / hour and can titrate upwards based on MAP and POCUS echo with evaluation of cardiac function  Onset of action of insulin can be delayed 45 minutes and it is recommended that vasopressors such as epinephrine be started prior; but once HIET " "started, can titrate off the vasopressors  Will likely need dextrose infusion to maintain adequate glucose; this may look like frequent "pushes" of D25 or D50, but can also be concentrated into infusions such as D20  Do NOT titrate insulin infusion to glucose in this scenario - titrate only to MAP and bedside echo with evaluation of cardiac output  Doses exceeding 1 unit / kg / hr have been used in the sickest patients, maximum 10 units / kg / hr  Both glucose and potassium will need to be monitored closely in this scenario  I tend to avoid glucagon due vomiting risk especially with possible aspiration if altered mental status  Propranolol can cross the blood brain barrier can cause seizures, coma - requires monitoring  No indication for GI decontamination  No indication and no utility for extracorporeal removal  ECMO can be considered for refractory cases  Altered mental status  Work up as per primary, consideration of head imaging and repeat labs including VBG, lactic acid may be useful  Remainder of care as per primary  Contact if questions  _______________________________________________________________________________    Source of History:  consulting team, family, and EMR    Principal Problem: beta blocker toxicity    Chief Complaint:   Chief Complaint   Patient presents with    Psychiatric Evaluation     28 yo fem to triage for suicide attempt. Pt took a full bottle of propranolol 20 mg around 4pm states it was a full bottle of 80-90 pills. Denies HI, /VH. AAOx4    Suicide Attempt        HISTORY OF PRESENT ILLNESS:   Michela No is a 29 y.o. female presenting with hypotension and bradycardia after being cleared overnight after reporting ingestion of propranolol, somewhere around 80 tablets. Had relatively normal vital signs with SBP 90s and occasional HR 60s overnight until when she was cleared. She received olanzapine and lorazepam for agitation. She arrived to psychiatric facility where she was " "subsequently transported back to [facility] for re-evaluation when she was found to be bradycardic and hypotensive. She received fluids. She is somewhat somnolent but ED physician reports she is protecting her airway.    Recently prescribed in August 2024 propranolol 20mg for migraines/anxiety (by Neurology according to care everywhere).    Mother Kitty No - notes that she took the propranolol yesterday afternoon perhaps calling to notify her about the ingestion and that she felt remorse, was "looking for charcoal". Message sent from the patient 4:51pm to her mother about going to the emergency room. Mother believes it was one bottle of one type of medication.    This consult is for suspected propranolol toxicity      Past Medical History:   Diagnosis Date    ADHD (attention deficit hyperactivity disorder)     Allergy     Anxiety     Borderline personality     Cerebral palsy with spastic/ataxic diplegia     Depression     Genital herpes 2/15/2015    Headache(784.0)     Hemiparesis     since infancy due to shaking.    Herpes simplex virus (HSV) infection     Mood disorder 7/25/2013    PCOS (polycystic ovarian syndrome)     as per pt    Personality disorder 9/30/2012    Pseudoseizures 7/29/2013    Seizures 2010    Self-harming behavior     Substance abuse      Past Surgical History:   Procedure Laterality Date    FOOT TENDON SURGERY       No current facility-administered medications on file prior to encounter.     Current Outpatient Medications on File Prior to Encounter   Medication Sig Dispense Refill    acetaminophen (TYLENOL) 325 MG tablet Take 2 tablets (650 mg total) by mouth every 6 (six) hours as needed for Pain. 30 tablet 1    erythromycin (ROMYCIN) ophthalmic ointment 1 cm ribbon to eye(s) 2-3 times per day, may use up to 6 times per day for 7-10 days 1 g 0    ibuprofen (ADVIL,MOTRIN) 600 MG tablet Take 1 tablet (600 mg total) by mouth every 6 (six) hours as needed for Pain. 30 tablet 1    " PNV,calcium 72-iron-folic acid (PRENATAL VITAMIN PLUS LOW IRON) 27 mg iron- 1 mg Tab Take one tablet daily.  Prescribe prenatal covered by insurance (Patient taking differently: Take one tablet daily.  Prescribe prenatal covered by insurance) 90 tablet 11    valACYclovir (VALTREX) 500 MG tablet Take 1 tablet (500 mg total) by mouth 2 (two) times daily. 60 tablet 0       Review of patient's allergies indicates:   Allergen Reactions    Fish containing products Swelling         REVIEW OF SYSTEMS:    Please refer to primary bedside note.  This is an E-Consult note.    PHYSICAL EXAM:    Please refer to primary bedside note.  This is an E-Consult note.    Results for orders placed or performed during the hospital encounter of 10/21/24   EKG 12-lead    Narrative    Ordered by an unspecified provider.        EKG independent interpretation  Performed: 10/22/2024  Rate/Rhythm/Axis: 46 bpm, sinus rhythm, nml axis  QRS 80 ms  Qtc 435 ms  Impression: sinus bradycardia. Normal intervals. HR 45    Vital Signs (Most Recent):  Temp: 98.5 °F (36.9 °C) (10/22/24 0916)  Pulse: (!) 45 (10/22/24 1113)  Resp: 11 (10/22/24 1113)  BP: (!) 106/53 (10/22/24 1112)  SpO2: 100 % (10/22/24 1113) Vital Signs (24h Range):  Temp:  [98.4 °F (36.9 °C)-98.5 °F (36.9 °C)] 98.5 °F (36.9 °C)  Pulse:  [45-75] 45  Resp:  [11-49] 11  SpO2:  [98 %-100 %] 100 %  BP: ()/(37-94) 106/53       Significant Labs: All pertinent labs within the past 24 hours have been reviewed.    Significant Imaging: I have reviewed all pertinent imaging results/findings within the past 24 hours.      ASSESSMENT:     29 y.o. female presenting with concerns for propranolol toxicity, delayed in onset, but other considerations: tizanidine (Recent rx, has prescription), substance use, doubt withdrawal    [Propranolol] is a beta-adrenergic antagonist, or beta-blocker.  Beta-blockers are known for their antagonistic effects of catecholamines at the beta receptors.  In toxic  overdoses, they will likely cause bradycardia and hypotension due to slowed rate of SA node discharge.  Cardiac contractility is overall impaired.  Propranolol is lipid soluble, and concentrates in adipose, cardiac and brain tissue, where it can exert toxic effects.  CNS effects such as seizure, coma and central CNS depression causing even a respiratory depression can occur.  Dysrhythmias, usually bradydysrhythmias, are common.  Management of overdose can include atropine (especially if bradycardia present prior to a procedure such as intubation or OG tube placement), glucagon for it's inotropic effects (caution in AMS and concern for vomiting as side effect), calcium chloride (preferred) or calcium gluconate for hypotension and to increase cardiac contractility, and high-dose insulin euglycemic therapy (HIET) for inotropic effects.  HIET requires aggressive glucose monitoring, potassium monitoring and blood pressure monitoring, as the insulin is started in high doses and titrated to keep MAPs >65.  Prior to starting HIET, it is recommended that a vasopressor be initiated to maintain adequate MAPs as the onset of action of the insulin infusion may take 30-40 min.  epinephrine may be utilized if primarily bradycardia is the concern with cardiogenic shock, and norepinephrine may be utilized if vasoplegia and hypotensive shock is primary.  Intralipid may be warranted in leighann-arrest situations. Hemodialysis is not indicated for propranolol toxicity. ECMO for refractory cases.    Tizanidine is a central alpha 2 agonist, sympatholytic, and can cause hypotension and bradycardia.  Symptomatic and supportive care with vasopressors as needed.    RECOMMENDATIONS:    Please refer to top of note.      I spent 60 minutes in review of the case and relevant documentation, lab work and imaging.  I discussed the case with the primary team and the family.  Conversation time >50% including discussion with mother, primary team.    Thank  you for involving the Medical Toxicology Service in the care of this patient please feel free to contact us any time with any questions.   I will follow-up with patient. Please contact us if you have any additional questions.    Devendra Talley DO  [facility]  Spectra [phone]     Please note: this is a Medical Toxicology E-Consultation note, not an Emergency Department note.    *This eConsult is based on the clinical data available to me and is furnished without benefit of a physical examination. The eConsult will need to be interpreted in light of any clinical issues or changes in patient status not available to me at the time of filing this eConsults. Significant changes in patient condition or level of acuity should result in immediate formal consultation and reevaluation. Please alert me if you have further questions.    Thank you for your consult.

## 2024-10-22 NOTE — H&P
[facility] Medicine  History & Physical    Patient Name: Michela No  MRN: [id number]  Patient Class: IP- Inpatient  Admission Date: 10/21/2024  Attending Physician: No att. providers found   Primary Care Provider: Elijah Beck MD         Patient information was obtained from patient, past medical records, and ER records.     Subjective:     Principal Problem:Intentional overdose of beta-adrenergic blocking drug    Chief Complaint:   Chief Complaint   Patient presents with    Psychiatric Evaluation     30 yo fem to triage for suicide attempt. Pt took a full bottle of propranolol 20 mg around 4pm states it was a full bottle of 80-90 pills. Denies HI, AH/VH. AAOx4    Suicide Attempt        HPI:   Michela No is a 29 y.o. female who has a past medical history of ADHD (attention deficit hyperactivity disorder), Allergy, Anxiety, Borderline personality, Cerebral palsy with spastic/ataxic diplegia, Depression, Genital herpes, Herpes simplex virus (HSV) infection, Mood disorder, PCOS (polycystic ovarian syndrome), Personality disorder, Pseudoseizures, Seizures, Self-harming behavior, and Substance abuse, presented to the ED after inentional overdose. Patient is drowsy and HPI largely from ED: She reported she took 80-90 tablets [propanolol] around 4pm yesterday, reports vomiting shortly thereafter, and presented suicidal. She was evaluated and cleared around midnight. Transferred to the psychiatric facility this morning after becoming agitated with zyprexa/ativan given. She was hypotensive and returned to us here, and has been bradycardic. I have been on the phone with poison center who stated peak action would be 6-8 hrs, however after discussion with tox, seems more consistent with bb overdose and could have delayed effect 24 hours out. Complicating this is reported meth abuse with last use yesterday. Repeat labwork is ordered, she is trialed on atropine/calcium and some slight  improvement in her HR now to the low 70s, still mildly hypotensive on levophed peripherally at this point. Tox recommended change to epi gtt. If she shows signs of cardiac dysfunction, high dose insulin would be the next step.     Patient bradycardic to 30's and hypotensive to 70/41, received atropine 1 mg and Calcium gluconate and started on epinephrine gtt. Poison control consulted and recs made.       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    Herpes simplex virus (HSV) infection     Mood disorder 7/25/2013    PCOS (polycystic ovarian syndrome)     as per pt    Personality disorder 9/30/2012    Pseudoseizures 7/29/2013    Seizures 2010    Self-harming behavior     Substance abuse        Past Surgical History:   Procedure Laterality Date    FOOT TENDON SURGERY         Review of patient's allergies indicates:   Allergen Reactions    Fish containing products Swelling       No current facility-administered medications on file prior to encounter.     Current Outpatient Medications on File Prior to Encounter   Medication Sig    acetaminophen (TYLENOL) 325 MG tablet Take 2 tablets (650 mg total) by mouth every 6 (six) hours as needed for Pain.    erythromycin (ROMYCIN) ophthalmic ointment 1 cm ribbon to eye(s) 2-3 times per day, may use up to 6 times per day for 7-10 days    PNV,calcium 72-iron-folic acid (PRENATAL VITAMIN PLUS LOW IRON) 27 mg iron- 1 mg Tab Take one tablet daily.  Prescribe prenatal covered by insurance (Patient taking differently: Take one tablet daily.  Prescribe prenatal covered by insurance)    valACYclovir (VALTREX) 500 MG tablet Take 1 tablet (500 mg total) by mouth 2 (two) times daily.    [DISCONTINUED] ibuprofen (ADVIL,MOTRIN) 600 MG  tablet Take 1 tablet (600 mg total) by mouth every 6 (six) hours as needed for Pain.    [DISCONTINUED] propranoloL (INDERAL) 20 MG tablet Take 20 mg by mouth 3 (three) times daily.     Family History       Problem Relation (Age of Onset)    No Known Problems Mother, Father          Tobacco Use    Smoking status: Former     Current packs/day: 0.50     Average packs/day: 0.5 packs/day for 6.0 years (3.0 ttl pk-yrs)     Types: Vaping w/o nicotine, Cigarettes    Smokeless tobacco: Never    Tobacco comments:     marijuana and cigarettes   Substance and Sexual Activity    Alcohol use: Not Currently     Comment: heavily    Drug use: Yes     Types: Methamphetamines, Marijuana     Comment: 4 months    Sexual activity: Yes     Partners: Male     Birth control/protection: None     Comment: condom during outbreak     Review of Systems   Unable to perform ROS: Acuity of condition     Objective:     Vital Signs (Most Recent):  Temp: (P) 97.6 °F (36.4 °C) (10/22/24 1327)  Pulse: 63 (10/22/24 1327)  Resp: 15 (10/22/24 1327)  BP: 124/79 (10/22/24 1327)  SpO2: 100 % (10/22/24 1327) Vital Signs (24h Range):  Temp:  [97.6 °F (36.4 °C)-98.5 °F (36.9 °C)] (P) 97.6 °F (36.4 °C)  Pulse:  [38-76] 63  Resp:  [10-49] 15  SpO2:  [98 %-100 %] 100 %  BP: ()/(37-95) 124/79     Weight: 72.6 kg (160 lb)  Body mass index is 25.82 kg/m².     Physical Exam  Constitutional:       Appearance: She is ill-appearing and toxic-appearing.      Comments: Drowsy   Eyes:      General: No scleral icterus.     Pupils: Pupils are equal, round, and reactive to light.   Cardiovascular:      Rate and Rhythm: Bradycardia present.      Heart sounds: No murmur heard.     No gallop.   Pulmonary:      Effort: No respiratory distress.      Breath sounds: No stridor. No wheezing.   Abdominal:      Tenderness: There is no abdominal tenderness. There is no guarding.   Musculoskeletal:         General: No swelling or deformity.   Skin:     Coloration: Skin is not  jaundiced.      Findings: No bruising.   Neurological:      Mental Status: She is disoriented.      Cranial Nerves: No cranial nerve deficit.      Motor: No weakness.              CRANIAL NERVES     CN III, IV, VI   Pupils are equal, round, and reactive to light.           Recent Results (from the past 24 hours)   POCT glucose    Collection Time: 10/21/24  6:22 PM   Result Value Ref Range    POCT Glucose 129 (H) 70 - 110 mg/dL   CBC auto differential    Collection Time: 10/21/24  6:30 PM   Result Value Ref Range    WBC 11.39 3.90 - 12.70 K/uL    RBC 4.84 4.00 - 5.40 M/uL    Hemoglobin 15.3 12.0 - 16.0 g/dL    Hematocrit 44.5 37.0 - 48.5 %    MCV 92 82 - 98 fL    MCH 31.6 (H) 27.0 - 31.0 pg    MCHC 34.4 32.0 - 36.0 g/dL    RDW 11.8 11.5 - 14.5 %    Platelets 393 150 - 450 K/uL    MPV 8.4 (L) 9.2 - 12.9 fL    Immature Granulocytes 0.2 0.0 - 0.5 %    Gran # (ANC) 8.2 (H) 1.8 - 7.7 K/uL    Immature Grans (Abs) 0.02 0.00 - 0.04 K/uL    Lymph # 2.4 1.0 - 4.8 K/uL    Mono # 0.7 0.3 - 1.0 K/uL    Eos # 0.1 0.0 - 0.5 K/uL    Baso # 0.08 0.00 - 0.20 K/uL    nRBC 0 0 /100 WBC    Gran % 71.7 38.0 - 73.0 %    Lymph % 21.0 18.0 - 48.0 %    Mono % 6.0 4.0 - 15.0 %    Eosinophil % 0.4 0.0 - 8.0 %    Basophil % 0.7 0.0 - 1.9 %    Differential Method Automated    Comprehensive metabolic panel    Collection Time: 10/21/24  6:30 PM   Result Value Ref Range    Sodium 139 136 - 145 mmol/L    Potassium 4.2 3.5 - 5.1 mmol/L    Chloride 105 95 - 110 mmol/L    CO2 24 23 - 29 mmol/L    Glucose 106 70 - 110 mg/dL    BUN 15 6 - 20 mg/dL    Creatinine 0.9 0.5 - 1.4 mg/dL    Calcium 10.2 8.7 - 10.5 mg/dL    Total Protein 8.5 (H) 6.0 - 8.4 g/dL    Albumin 4.9 3.5 - 5.2 g/dL    Total Bilirubin 0.8 0.1 - 1.0 mg/dL    Alkaline Phosphatase 63 40 - 150 U/L    AST 46 (H) 10 - 40 U/L    ALT 43 10 - 44 U/L    eGFR >60 >60 mL/min/1.73 m^2    Anion Gap 10 8 - 16 mmol/L   TSH    Collection Time: 10/21/24  6:30 PM   Result Value Ref Range    TSH 3.173 0.400 -  4.000 uIU/mL   Ethanol    Collection Time: 10/21/24  6:30 PM   Result Value Ref Range    Alcohol, Serum <10 <10 mg/dL   Acetaminophen level    Collection Time: 10/21/24  6:30 PM   Result Value Ref Range    Acetaminophen (Tylenol), Serum <3.0 (L) 10.0 - 20.0 ug/mL   Salicylate level    Collection Time: 10/21/24  6:30 PM   Result Value Ref Range    Salicylate Lvl <5.0 (L) 15.0 - 30.0 mg/dL   HCG, Quantitative    Collection Time: 10/21/24  6:30 PM   Result Value Ref Range    HCG Quant <1.2 See Text mIU/mL   POCT COVID-19 Rapid Screening    Collection Time: 10/21/24  7:13 PM   Result Value Ref Range    POC Rapid COVID Negative Negative     Acceptable Yes    Urinalysis, Reflex to Urine Culture Urine, Clean Catch    Collection Time: 10/21/24  7:25 PM    Specimen: Urine   Result Value Ref Range    Specimen UA Urine, Clean Catch     Color, UA Yellow Yellow, Straw, Maia    Appearance, UA Hazy (A) Clear    pH, UA 7.0 5.0 - 8.0    Specific Gravity, UA 1.010 1.005 - 1.030    Protein, UA Negative Negative    Glucose, UA Negative Negative    Ketones, UA Negative Negative    Bilirubin (UA) Negative Negative    Occult Blood UA Negative Negative    Nitrite, UA Negative Negative    Urobilinogen, UA Negative <2.0 EU/dL    Leukocytes, UA Trace (A) Negative   Drug screen panel, emergency    Collection Time: 10/21/24  7:25 PM   Result Value Ref Range    Benzodiazepines Negative Negative    Methadone metabolites Negative Negative    Cocaine (Metab.) Negative Negative    Opiate Scrn, Ur Negative Negative    Barbiturate Screen, Ur Negative Negative    Amphetamine Screen, Ur Presumptive Positive (A) Negative    THC Negative Negative    Phencyclidine Negative Negative    Creatinine, Urine 40.2 15.0 - 325.0 mg/dL    Toxicology Information SEE COMMENT    Urinalysis Microscopic    Collection Time: 10/21/24  7:25 PM   Result Value Ref Range    WBC, UA 3 0 - 5 /hpf    Squam Epithel, UA 14 /hpf    Microscopic Comment SEE COMMENT     POCT urine pregnancy    Collection Time: 10/21/24  7:26 PM   Result Value Ref Range    POC Preg Test, Ur Negative Negative     Acceptable Yes    POCT glucose    Collection Time: 10/22/24 10:39 AM   Result Value Ref Range    POCT Glucose 86 70 - 110 mg/dL   Troponin I    Collection Time: 10/22/24 11:27 AM   Result Value Ref Range    Troponin I <0.006 0.000 - 0.026 ng/mL   CBC auto differential    Collection Time: 10/22/24  1:36 PM   Result Value Ref Range    WBC 7.23 3.90 - 12.70 K/uL    RBC 4.69 4.00 - 5.40 M/uL    Hemoglobin 14.7 12.0 - 16.0 g/dL    Hematocrit 44.4 37.0 - 48.5 %    MCV 95 82 - 98 fL    MCH 31.3 (H) 27.0 - 31.0 pg    MCHC 33.1 32.0 - 36.0 g/dL    RDW 12.0 11.5 - 14.5 %    Platelets 301 150 - 450 K/uL    MPV 8.4 (L) 9.2 - 12.9 fL    Immature Granulocytes 0.6 (H) 0.0 - 0.5 %    Gran # (ANC) 4.0 1.8 - 7.7 K/uL    Immature Grans (Abs) 0.04 0.00 - 0.04 K/uL    Lymph # 2.4 1.0 - 4.8 K/uL    Mono # 0.5 0.3 - 1.0 K/uL    Eos # 0.2 0.0 - 0.5 K/uL    Baso # 0.07 0.00 - 0.20 K/uL    nRBC 0 0 /100 WBC    Gran % 55.2 38.0 - 73.0 %    Lymph % 33.1 18.0 - 48.0 %    Mono % 7.5 4.0 - 15.0 %    Eosinophil % 2.6 0.0 - 8.0 %    Basophil % 1.0 0.0 - 1.9 %    Differential Method Automated        Microbiology Results (last 7 days)       ** No results found for the last 168 hours. **             Imaging Results              X-Ray Chest AP Portable (Final result)  Result time 10/21/24 18:37:52      Final result by Teodora Sears MD (10/21/24 18:37:52)                   Impression:      No acute cardiopulmonary process identified.      Electronically signed by: Teodora Sears MD  Date:    10/21/2024  Time:    18:37               Narrative:    EXAMINATION:  XR CHEST AP PORTABLE    CLINICAL HISTORY:  Poisoning by unspecified drugs, medicaments and biological substances, accidental (unintentional), initial encounter    TECHNIQUE:  Single frontal view of the chest was performed.    COMPARISON:  April  2023.    FINDINGS:  Cardiac silhouette is normal in size.  Lungs are symmetrically expanded.  No evidence of focal consolidative process, pneumothorax, or significant pleural effusion.  No acute osseous abnormality identified.                                        Assessment/Plan:     * Intentional overdose of beta-adrenergic blocking drug  Bradycardic and hypotensive  80-90 short acting propranolol taken  Presumably should wear off quick and intake was 10/21  Consult psych when appropriate  PEC'd, return to inpt psych wang when able  Atropine, CaGluc, and Epinephrine given  Consider high dose IV insulin if hemodynamically unstable      Polysubstance abuse  Noted hx  Has meth in her system  Consult psych when appropriate  PEC'd      Pseudoseizures  No seizure like activity on presentation       Cerebral palsy  Noted      Personality disorder  Noted, consult psych when appropriate  PEC'd        VTE Risk Mitigation (From admission, onward)           Ordered     IP VTE LOW RISK PATIENT  Once         10/22/24 1256     Place sequential compression device  Until discontinued         10/22/24 1256                  Critical care time spent on the evaluation and treatment of severe organ dysfunction, review of pertinent labs and imaging studies, discussions with consulting providers and discussions with patient/family: 35 minutes.                  David Nieves MD  Department of Hospital Medicine  [facility] [place] - Intensive Care

## 2024-10-22 NOTE — ED NOTES
Report received from Felisa SCHNEIDER. Pt is hypotensive and bradycardic at this time. Pt was place on a cardiac monitor and trendelenburg position at this time by this RN. Pt is lethargic but arousable to verbal stimulation. Pt unable to answer questions due to lethargy but is somewhat able to follow simple commands.  Dr. Figueroa at bedside at this time. Safety sitter present.

## 2024-10-22 NOTE — PROCEDURES
"Michela No is a 29 y.o. female patient.    Temp: (P) 97.6 °F (36.4 °C) (10/22/24 1327)  Pulse: (!) 54 (10/22/24 1645)  Resp: 13 (10/22/24 1645)  BP: (!) 140/77 (10/22/24 1645)  SpO2: 99 % (10/22/24 1645)  Weight: 72.6 kg (160 lb) (10/21/24 1810)  Height: 5' 6" (167.6 cm) (10/21/24 1810)    PICC  Date/Time: 10/22/2024 5:12 PM  Performed by: Vinnie Martinez RN  Consent Done: Yes  Time out: Immediately prior to procedure a time out was called to verify the correct patient, procedure, equipment, support staff and site/side marked as required  Indications: med administration and vascular access  Anesthesia: local infiltration  Local anesthetic: lidocaine 1% without epinephrine  Anesthetic Total (mL): 5  Preparation: skin prepped with ChloraPrep  Skin prep agent dried: skin prep agent completely dried prior to procedure  Sterile barriers: all five maximum sterile barriers used - cap, mask, sterile gown, sterile gloves, and large sterile sheet  Hand hygiene: hand hygiene performed prior to central venous catheter insertion  Location details: right brachial  Catheter type: triple lumen  Catheter size: 5 Fr  Catheter Length: 33cm    Ultrasound guidance: yes  Vessel Caliber: medium, compressibility normal  Needle advanced into vessel with real time Ultrasound guidance.  Guidewire confirmed in vessel.  Sterile sheath used.  Number of attempts: 1  Post-procedure: blood return through all ports, chlorhexidine patch and sterile dressing applied            Name vinnie martinez  10/22/2024    "

## 2024-10-22 NOTE — ASSESSMENT & PLAN NOTE
This patient has shock. The type of shock is cardiogenic due to cardiotoxicity. The patient has the following evidence of shock: persistent hypotension. The patient will be admitted to an intensive care unit, they will be treated with epinephrine.  -will trend lactate and mixed venous once PICC placed.  -avoid further IVF as primary issue will be contractility related and not pre-load

## 2024-10-22 NOTE — ED NOTES
Florian brought pt back due to low blood pressure of 80/54.  Pt appears to be sleeping.   Placed in bed, attached to monitors and sitter is at the bedside.

## 2024-10-22 NOTE — ASSESSMENT & PLAN NOTE
Bradycardic and hypotensive  80-90 short acting propranolol taken  Presumably should wear off quick and intake was 10/21  Consult psych when appropriate  PEC'd, return to inpt psych wang when able  Atropine, CaGluc, and Epinephrine given  Consider high dose IV insulin if hemodynamically unstable
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
## 2024-10-22 NOTE — ASSESSMENT & PLAN NOTE
Pt admitted to methamphetamine use but no opiods- possible cause of delayed decompensation from propanolol ingestion.

## 2024-10-23 LAB
ALBUMIN SERPL BCP-MCNC: 3.8 G/DL (ref 3.5–5.2)
ALP SERPL-CCNC: 47 U/L (ref 40–150)
ALT SERPL W/O P-5'-P-CCNC: 32 U/L (ref 10–44)
ANION GAP SERPL CALC-SCNC: 8 MMOL/L (ref 8–16)
AST SERPL-CCNC: 24 U/L (ref 10–40)
BASOPHILS # BLD AUTO: 0.06 K/UL (ref 0–0.2)
BASOPHILS NFR BLD: 0.4 % (ref 0–1.9)
BILIRUB SERPL-MCNC: 0.9 MG/DL (ref 0.1–1)
BUN SERPL-MCNC: 8 MG/DL (ref 6–20)
CALCIUM SERPL-MCNC: 8.7 MG/DL (ref 8.7–10.5)
CHLORIDE SERPL-SCNC: 107 MMOL/L (ref 95–110)
CO2 SERPL-SCNC: 24 MMOL/L (ref 23–29)
CREAT SERPL-MCNC: 0.8 MG/DL (ref 0.5–1.4)
CREAT UR-MCNC: 65.9 MG/DL (ref 15–325)
DIFFERENTIAL METHOD BLD: ABNORMAL
EOSINOPHIL # BLD AUTO: 0 K/UL (ref 0–0.5)
EOSINOPHIL NFR BLD: 0.2 % (ref 0–8)
ERYTHROCYTE [DISTWIDTH] IN BLOOD BY AUTOMATED COUNT: 11.4 % (ref 11.5–14.5)
EST. GFR  (NO RACE VARIABLE): >60 ML/MIN/1.73 M^2
FENTANYL UR QL SCN: NEGATIVE
GLUCOSE SERPL-MCNC: 169 MG/DL (ref 70–110)
HCT VFR BLD AUTO: 38 % (ref 37–48.5)
HGB BLD-MCNC: 13.2 G/DL (ref 12–16)
IMM GRANULOCYTES # BLD AUTO: 0.06 K/UL (ref 0–0.04)
IMM GRANULOCYTES NFR BLD AUTO: 0.4 % (ref 0–0.5)
LYMPHOCYTES # BLD AUTO: 2.1 K/UL (ref 1–4.8)
LYMPHOCYTES NFR BLD: 13.7 % (ref 18–48)
MCH RBC QN AUTO: 31.4 PG (ref 27–31)
MCHC RBC AUTO-ENTMCNC: 34.7 G/DL (ref 32–36)
MCV RBC AUTO: 91 FL (ref 82–98)
MONOCYTES # BLD AUTO: 0.9 K/UL (ref 0.3–1)
MONOCYTES NFR BLD: 5.5 % (ref 4–15)
NEUTROPHILS # BLD AUTO: 12.3 K/UL (ref 1.8–7.7)
NEUTROPHILS NFR BLD: 79.8 % (ref 38–73)
NRBC BLD-RTO: 0 /100 WBC
PLATELET # BLD AUTO: 356 K/UL (ref 150–450)
PMV BLD AUTO: 8.6 FL (ref 9.2–12.9)
POTASSIUM SERPL-SCNC: 4 MMOL/L (ref 3.5–5.1)
PROT SERPL-MCNC: 6.3 G/DL (ref 6–8.4)
RBC # BLD AUTO: 4.2 M/UL (ref 4–5.4)
SODIUM SERPL-SCNC: 139 MMOL/L (ref 136–145)
WBC # BLD AUTO: 15.39 K/UL (ref 3.9–12.7)

## 2024-10-23 PROCEDURE — 99900035 HC TECH TIME PER 15 MIN (STAT)

## 2024-10-23 PROCEDURE — 99291 CRITICAL CARE FIRST HOUR: CPT | Mod: ,,, | Performed by: INTERNAL MEDICINE

## 2024-10-23 PROCEDURE — 80053 COMPREHEN METABOLIC PANEL: CPT | Performed by: STUDENT IN AN ORGANIZED HEALTH CARE EDUCATION/TRAINING PROGRAM

## 2024-10-23 PROCEDURE — 63600175 PHARM REV CODE 636 W HCPCS: Performed by: INTERNAL MEDICINE

## 2024-10-23 PROCEDURE — 80307 DRUG TEST PRSMV CHEM ANLYZR: CPT | Performed by: EMERGENCY MEDICINE

## 2024-10-23 PROCEDURE — 25000003 PHARM REV CODE 250: Performed by: INTERNAL MEDICINE

## 2024-10-23 PROCEDURE — 25000003 PHARM REV CODE 250: Performed by: STUDENT IN AN ORGANIZED HEALTH CARE EDUCATION/TRAINING PROGRAM

## 2024-10-23 PROCEDURE — 20000000 HC ICU ROOM

## 2024-10-23 PROCEDURE — 85025 COMPLETE CBC W/AUTO DIFF WBC: CPT | Performed by: STUDENT IN AN ORGANIZED HEALTH CARE EDUCATION/TRAINING PROGRAM

## 2024-10-23 RX ORDER — IBUPROFEN 600 MG/1
600 TABLET ORAL ONCE
Status: COMPLETED | OUTPATIENT
Start: 2024-10-23 | End: 2024-10-23

## 2024-10-23 RX ADMIN — EPINEPHRINE 0.24 MCG/KG/MIN: 1 INJECTION INTRAMUSCULAR; INTRAVENOUS; SUBCUTANEOUS at 03:10

## 2024-10-23 RX ADMIN — ACETAMINOPHEN 650 MG: 325 TABLET ORAL at 07:10

## 2024-10-23 RX ADMIN — MUPIROCIN: 20 OINTMENT TOPICAL at 09:10

## 2024-10-23 RX ADMIN — EPINEPHRINE 0.02 MCG/KG/MIN: 1 INJECTION INTRAMUSCULAR; INTRAVENOUS; SUBCUTANEOUS at 05:10

## 2024-10-23 RX ADMIN — EPINEPHRINE 0.24 MCG/KG/MIN: 1 INJECTION INTRAMUSCULAR; INTRAVENOUS; SUBCUTANEOUS at 08:10

## 2024-10-23 RX ADMIN — ACETAMINOPHEN 650 MG: 325 TABLET ORAL at 02:10

## 2024-10-23 RX ADMIN — IBUPROFEN 600 MG: 600 TABLET ORAL at 06:10

## 2024-10-23 NOTE — ASSESSMENT & PLAN NOTE
Continue PEC. Psych note reviewed. Doubt any additional ingested substances given the patient clear and open voluntary information provided to triage nurse but will monitor.  -Psych to follow

## 2024-10-23 NOTE — PLAN OF CARE
Problem: Adult Inpatient Plan of Care  Goal: Plan of Care Review  10/23/2024 1656 by Alisson Bruno RN  Outcome: Progressing  10/23/2024 1655 by Alisson Bruno RN  Outcome: Progressing  Goal: Patient-Specific Goal (Individualized)  10/23/2024 1656 by Alisson Bruno RN  Outcome: Progressing  10/23/2024 1655 by Alisson Bruno RN  Outcome: Progressing  Goal: Absence of Hospital-Acquired Illness or Injury  10/23/2024 1656 by Alisson Bruno RN  Outcome: Progressing  10/23/2024 1655 by Alisson Bruno RN  Outcome: Progressing  Goal: Optimal Comfort and Wellbeing  10/23/2024 1656 by Alisson Bruno RN  Outcome: Progressing  10/23/2024 1655 by Alisson Bruno RN  Outcome: Progressing  Goal: Readiness for Transition of Care  10/23/2024 1656 by Alisson Bruno RN  Outcome: Progressing  10/23/2024 1655 by Alisson Bruno RN  Outcome: Progressing     Problem: Suicide Risk  Goal: Absence of Self-Harm  10/23/2024 1656 by Alisson Bruno RN  Outcome: Progressing  10/23/2024 1655 by Alisson Bruno RN  Outcome: Progressing     Problem: Infection  Goal: Absence of Infection Signs and Symptoms  10/23/2024 1656 by Alisson Bruno RN  Outcome: Progressing  10/23/2024 1655 by Alisson Bruno, RN  Outcome: Progressing     Problem: Skin Injury Risk Increased  Goal: Skin Health and Integrity  10/23/2024 1656 by Alisson Bruno RN  Outcome: Progressing  10/23/2024 1655 by Alisson Bruno RN  Outcome: Progressing     Problem: Overdose  Goal: Optimal Coping  Outcome: Progressing  Goal: Absence of Bleeding  Outcome: Progressing  Goal: Stable Heart Rate and Rhythm  Outcome: Progressing  Goal: Fluid Balance  Outcome: Progressing  Goal: Effective Tissue Perfusion  Outcome: Progressing  Goal: Optimal Neurologic Function  Outcome: Progressing  Goal: Effective Oxygenation and Ventilation  Outcome: Progressing

## 2024-10-23 NOTE — NURSING
Nurses Note -- 4 Eyes      10/22/2024   1915      Skin assessed during: Q Shift Change      [x] No Altered Skin Integrity Present    [x]Prevention Measures Documented      [] Yes- Altered Skin Integrity Present or Discovered   [] LDA Added if Not in Epic (Describe Wound)   [] New Altered Skin Integrity was Present on Admit and Documented in LDA   [] Wound Image Taken    Wound Care Consulted? No    Attending Nurse:  Ginger Flores RN/Staff Member:   WYATT Barrow

## 2024-10-23 NOTE — HOSPITAL COURSE
Admitted after intentional BB o/d (80-90 pills). Initially patient was bradycardic to 30's and hypotensive to 70/41, received atropine 1 mg and Calcium gluconate and started on epinephrine gtt. Poison control consulted and recs made.  Crital Care consulted, appreciate assistance.

## 2024-10-23 NOTE — NURSING
Call placed to [facility], spoke to Chiquita. She was informed that patient was returned to hospital yesterday morning for low BP. She states that they did not know of patients return and that  will be out to see patient in the morning.

## 2024-10-23 NOTE — SUBJECTIVE & OBJECTIVE
Interval History: Much more awake and alert. Wanting to get back to her 2 children. Denies acitve SI. Had migraine overnight. Remains on Epi but HD are stable on Epi.    Discussed case in detail with hospitalist.       Objective:     Vital Signs (Most Recent):  Temp: 98 °F (36.7 °C) (10/23/24 1101)  Pulse: 69 (10/23/24 1215)  Resp: 15 (10/23/24 1215)  BP: 111/62 (10/23/24 1215)  SpO2: 98 % (10/23/24 1215) Vital Signs (24h Range):  Temp:  [97.6 °F (36.4 °C)-98.2 °F (36.8 °C)] 98 °F (36.7 °C)  Pulse:  [42-93] 69  Resp:  [10-74] 15  SpO2:  [97 %-100 %] 98 %  BP: ()/() 111/62     Weight: 72.6 kg (160 lb)  Body mass index is 25.82 kg/m².      Intake/Output Summary (Last 24 hours) at 10/23/2024 1244  Last data filed at 10/23/2024 1200  Gross per 24 hour   Intake 2029.85 ml   Output 900 ml   Net 1129.85 ml        Physical Exam  Vitals reviewed.   Constitutional:       Appearance: She is ill-appearing (chronically).   HENT:      Head: Normocephalic and atraumatic.      Mouth/Throat:      Mouth: Mucous membranes are moist.   Eyes:      Extraocular Movements: Extraocular movements intact.      Pupils: Pupils are equal, round, and reactive to light.   Cardiovascular:      Rate and Rhythm: Normal rate and regular rhythm.   Pulmonary:      Effort: Pulmonary effort is normal.      Breath sounds: Normal breath sounds.   Musculoskeletal:         General: No swelling.   Skin:     General: Skin is warm and dry.      Capillary Refill: Capillary refill takes less than 2 seconds.   Neurological:      General: No focal deficit present.      Mental Status: She is alert and oriented to person, place, and time.   Psychiatric:         Mood and Affect: Mood normal.         Behavior: Behavior normal.           Review of Systems    Vents:       Lines/Drains/Airways       Peripherally Inserted Central Catheter Line  Duration             PICC Triple Lumen 10/22/24 1712 right brachial <1 day              Peripheral Intravenous Line   Duration                  Peripheral IV - Single Lumen 10/22/24 0949 20 G Right Antecubital 1 day         Peripheral IV - Single Lumen 10/22/24 1115 20 G Posterior;Right Wrist 1 day         Peripheral IV - Single Lumen 10/22/24 1342 20 G Anterior;Left Upper Arm <1 day                    Significant Labs:    CBC/Anemia Profile:  Recent Labs   Lab 10/21/24  1830 10/22/24  1336 10/23/24  0435   WBC 11.39 7.23 15.39*   HGB 15.3 14.7 13.2   HCT 44.5 44.4 38.0    301 356   MCV 92 95 91   RDW 11.8 12.0 11.4*        Chemistries:  Recent Labs   Lab 10/21/24  1830 10/22/24  1336 10/23/24  0435    141 139   K 4.2 4.7 4.0    111* 107   CO2 24 24 24   BUN 15 10 8   CREATININE 0.9 0.8 0.8   CALCIUM 10.2 10.4 8.7   ALBUMIN 4.9 4.3 3.8   PROT 8.5* 7.6 6.3   BILITOT 0.8 1.2* 0.9   ALKPHOS 63 55 47   ALT 43 43 32   AST 46* 37 24   MG  --  2.0  --    PHOS  --  2.7  --        All pertinent labs within the past 24 hours have been reviewed.    Significant Imaging:  I have reviewed all pertinent imaging results/findings within the past 24 hours.  Tely shows NSR, occasional sinus brendon

## 2024-10-23 NOTE — PROGRESS NOTES
[facility] Intensive Care  Critical Care Medicine  Progress Note    Patient Name: Michela No  MRN: [id number]  Admission Date: 10/21/2024  Hospital Length of Stay: 1 days  Code Status: Full Code  Attending Provider: David Nieves MD  Primary Care Provider: Elijah Beck MD   Principal Problem: Intentional overdose of beta-adrenergic blocking drug    Subjective:     HPI:  28 yo female with h/o polysubstance abuse who presents to ER with SA with ingestion of approximately 80 20mg propranolol pills. Ingestion occurred yesterday afternoon. Patient reported emesis after ingestion on purpose. Also reported methamphetamine use to triage to nurse. Patient became bradycardic and hypotensive this AM over 12 hours after ingestion. She was given atropine, Ca and switched from levophed to epinephrine. She was progressive increased drowsiness. She is transferred to ICU. Her HR is varying from 45-65 with adequate BP on low dose epinephrine. POCUS shows normal LV contractility on parasternal long and apical 4 chamber views. She is sleeping but awakens during POCUS and resisting exam purposefully but not able to verbalize and cannot give any history. Chart reviewed. Case discussed with hospitalist in detail.    Hospital/ICU Course:  No notes on file    Interval History: Much more awake and alert. Wanting to get back to her 2 children. Denies acitve SI. Had migraine overnight. Remains on Epi but HD are stable on Epi.    Discussed case in detail with hospitalist.       Objective:     Vital Signs (Most Recent):  Temp: 98 °F (36.7 °C) (10/23/24 1101)  Pulse: 69 (10/23/24 1215)  Resp: 15 (10/23/24 1215)  BP: 111/62 (10/23/24 1215)  SpO2: 98 % (10/23/24 1215) Vital Signs (24h Range):  Temp:  [97.6 °F (36.4 °C)-98.2 °F (36.8 °C)] 98 °F (36.7 °C)  Pulse:  [42-93] 69  Resp:  [10-74] 15  SpO2:  [97 %-100 %] 98 %  BP: ()/() 111/62     Weight: 72.6 kg (160 lb)  Body mass index is 25.82 kg/m².      Intake/Output Summary (Last  24 hours) at 10/23/2024 1244  Last data filed at 10/23/2024 1200  Gross per 24 hour   Intake 2029.85 ml   Output 900 ml   Net 1129.85 ml        Physical Exam  Vitals reviewed.   Constitutional:       Appearance: She is ill-appearing (chronically).   HENT:      Head: Normocephalic and atraumatic.      Mouth/Throat:      Mouth: Mucous membranes are moist.   Eyes:      Extraocular Movements: Extraocular movements intact.      Pupils: Pupils are equal, round, and reactive to light.   Cardiovascular:      Rate and Rhythm: Normal rate and regular rhythm.   Pulmonary:      Effort: Pulmonary effort is normal.      Breath sounds: Normal breath sounds.   Musculoskeletal:         General: No swelling.   Skin:     General: Skin is warm and dry.      Capillary Refill: Capillary refill takes less than 2 seconds.   Neurological:      General: No focal deficit present.      Mental Status: She is alert and oriented to person, place, and time.   Psychiatric:         Mood and Affect: Mood normal.         Behavior: Behavior normal.           Review of Systems    Vents:       Lines/Drains/Airways       Peripherally Inserted Central Catheter Line  Duration             PICC Triple Lumen 10/22/24 1712 right brachial <1 day              Peripheral Intravenous Line  Duration                  Peripheral IV - Single Lumen 10/22/24 0949 20 G Right Antecubital 1 day         Peripheral IV - Single Lumen 10/22/24 1115 20 G Posterior;Right Wrist 1 day         Peripheral IV - Single Lumen 10/22/24 1342 20 G Anterior;Left Upper Arm <1 day                    Significant Labs:    CBC/Anemia Profile:  Recent Labs   Lab 10/21/24  1830 10/22/24  1336 10/23/24  0435   WBC 11.39 7.23 15.39*   HGB 15.3 14.7 13.2   HCT 44.5 44.4 38.0    301 356   MCV 92 95 91   RDW 11.8 12.0 11.4*        Chemistries:  Recent Labs   Lab 10/21/24  1830 10/22/24  1336 10/23/24  0435    141 139   K 4.2 4.7 4.0    111* 107   CO2 24 24 24   BUN 15 10 8    CREATININE 0.9 0.8 0.8   CALCIUM 10.2 10.4 8.7   ALBUMIN 4.9 4.3 3.8   PROT 8.5* 7.6 6.3   BILITOT 0.8 1.2* 0.9   ALKPHOS 63 55 47   ALT 43 43 32   AST 46* 37 24   MG  --  2.0  --    PHOS  --  2.7  --        All pertinent labs within the past 24 hours have been reviewed.    Significant Imaging:  I have reviewed all pertinent imaging results/findings within the past 24 hours.  Tely shows NSR, occasional sinus brendon    ABG  Recent Labs   Lab 10/22/24  1735   PH 7.283*   PO2 42   PCO2 52.2*   HCO3 24.7   BE -2     Assessment/Plan:     Psychiatric  * Intentional overdose of beta-adrenergic blocking drug  Toxicology notes reviewed. Currently compensating well with epi alone and through the high risk time period. Will hold off on high dose insulin therapy.     Suicide attempt  Continue PEC. Psych note reviewed. Doubt any additional ingested substances given the patient clear and open voluntary information provided to triage nurse but will monitor.  -Psych to follow    Polysubstance abuse  Pt admitted to methamphetamine use but no opiods- possible cause of delayed decompensation from propanolol ingestion.    Cardiac/Vascular  Cardiogenic shock  This patient has shock. The type of shock is cardiogenic due to cardiotoxicity. The patient has the following evidence of shock: persistent hypotension. The patient will be admitted to an intensive care unit, they will be treated with epinephrine.  -lactate and mixed venous sat reassuring  -avoid further IVF as primary issue will be contractility related and not pre-load  -wean Epi for HR >60- may need for another 12-24 hours        Critical Care Time: 35 minutes  Critical secondary to Patient has a condition that poses threat to life and bodily function: Cardiogenic Shock and Suicide attempt.      Critical care was time spent personally by me on the following activities: development of treatment plan with patient or surrogate and bedside caregivers, discussions with consultants,  evaluation of patient's response to treatment, examination of patient, ordering and performing treatments and interventions, ordering and review of laboratory studies, ordering and review of radiographic studies, pulse oximetry, re-evaluation of patient's condition. This critical care time did not overlap with that of any other provider or involve time for any procedures.     Wm Saul MD  Critical Care Medicine  [facility] - Intensive Care

## 2024-10-23 NOTE — PLAN OF CARE
Pt AAO x 4, still lethargic, wakes up to voice, nods appropriately and follows commands. Parents came to visit last night. Free from fall/injury, on bedrest, turns independently, up to bedside commode with standby by assist. Skin intact. HR and BP more stable on epinephrine 0.24 mcg/kg/min. Pt has not voiced any suicidal ideation, has been asleep most of shift, asked about her two children.      Problem: Adult Inpatient Plan of Care  Goal: Plan of Care Review  Reactivated     Problem: Suicide Risk  Goal: Absence of Self-Harm  Reactivated     Problem: Infection  Goal: Absence of Infection Signs and Symptoms  Reactivated     Problem: Skin Injury Risk Increased  Goal: Skin Health and Integrity  Reactivated

## 2024-10-23 NOTE — EICU
EICU Physician Brief Note - Overnight Events    Patient c/o migraine HA, not helped by Tylenol.  She usually takes Goody's powder or Ibuprofen.  Plan:  Ordered Ibuprofen 600 mg po x1.  Eicu is available should acute issues arise.

## 2024-10-23 NOTE — NURSING
[facility]  Nurses Note -- 4 Eyes      10/23/2024       Skin assessed on: Q Shift      [x] No Pressure Injuries Present    [x]Prevention Measures Documented    [] Yes LDA  for Pressure Injury Previously documented     [] Yes New Pressure Injury Discovered   [] LDA for New Pressure Injury Added      Attending RN:  Alisson Bruno RN     Second RN:  Ginger Church RN

## 2024-10-23 NOTE — PROGRESS NOTES
[facility] Medicine  Progress Note    Patient Name: Michela No  MRN: [id number]  Patient Class: IP- Inpatient   Admission Date: 10/21/2024  Length of Stay: 1 days  Attending Physician: David Nieves MD  Primary Care Provider: Elijah Beck MD        Subjective:     Principal Problem:Intentional overdose of beta-adrenergic blocking drug        HPI:    Michela No is a 29 y.o. female who has a past medical history of ADHD (attention deficit hyperactivity disorder), Allergy, Anxiety, Borderline personality, Cerebral palsy with spastic/ataxic diplegia, Depression, Genital herpes, Herpes simplex virus (HSV) infection, Mood disorder, PCOS (polycystic ovarian syndrome), Personality disorder, Pseudoseizures, Seizures, Self-harming behavior, and Substance abuse, presented to the ED after inentional overdose. Patient is drowsy and HPI largely from ED: She reported she took 80-90 tablets [propanolol] around 4pm yesterday, reports vomiting shortly thereafter, and presented suicidal. She was evaluated and cleared around midnight. Transferred to the psychiatric facility this morning after becoming agitated with zyprexa/ativan given. She was hypotensive and returned to us here, and has been bradycardic. I have been on the phone with poison center who stated peak action would be 6-8 hrs, however after discussion with tox, seems more consistent with bb overdose and could have delayed effect 24 hours out. Complicating this is reported meth abuse with last use yesterday. Repeat labwork is ordered, she is trialed on atropine/calcium and some slight improvement in her HR now to the low 70s, still mildly hypotensive on levophed peripherally at this point. Tox recommended change to epi gtt. If she shows signs of cardiac dysfunction, high dose insulin would be the next step.     Patient bradycardic to 30's and hypotensive to 70/41, received atropine 1 mg and Calcium gluconate and started on epinephrine  gtt. Poison control consulted and recs made.       Overview/Hospital Course:  Admitted after intentional BB o/d (80-90 pills). Initially patient was bradycardic to 30's and hypotensive to 70/41, received atropine 1 mg and Calcium gluconate and started on epinephrine gtt. Poison control consulted and recs made.  Crital Care consulted, appreciate assistance.    Interval History:  NAEON.  No new issues.   CC- HA  All questions answered and updates on care given.       ROS:  General: Negative for fevers   Cardiac: Negative for chest pain   Pulmonary: Negative for wheezing  GI: Negative for abdominal distention      Vitals:    10/23/24 0545 10/23/24 0600 10/23/24 0615 10/23/24 0630   BP: 131/68 131/60 (!) 145/83 (!) 154/88   Pulse: (!) 45 (!) 54 (!) 48 (!) 52   Resp: 15 (!) 30 16 (!) 32   Temp:       TempSrc:       SpO2: 100% 97% 100% 100%   Weight:       Height:              Body mass index is 25.82 kg/m².      PHYSICAL EXAM:  Physical Exam  Constitutional:       Appearance: She is ill-appearing and toxic-appearing.      Comments: Drowsy   Eyes:      General: No scleral icterus.     Pupils: Pupils are equal, round, and reactive to light.   Cardiovascular:      Rate and Rhythm: Bradycardia present.      Heart sounds: No murmur heard.     No gallop.   Pulmonary:      Effort: No respiratory distress.      Breath sounds: No stridor. No wheezing.   Abdominal:      Tenderness: There is no abdominal tenderness. There is no guarding.   Musculoskeletal:         General: No swelling or deformity.   Skin:     Coloration: Skin is not jaundiced.      Findings: No bruising.   Neurological:      Mental Status: She is disoriented.      Cranial Nerves: No cranial nerve deficit.      Motor: No weakness.            Recent Results (from the past 24 hours)   POCT glucose    Collection Time: 10/22/24 10:39 AM   Result Value Ref Range    POCT Glucose 86 70 - 110 mg/dL   EKG 12-lead    Collection Time: 10/22/24 11:09 AM   Result Value Ref  Range    QRS Duration 80 ms    [facility] QTC Calculation 435 ms   Troponin I    Collection Time: 10/22/24 11:27 AM   Result Value Ref Range    Troponin I <0.006 0.000 - 0.026 ng/mL   Lactic acid, plasma    Collection Time: 10/22/24  1:36 PM   Result Value Ref Range    Lactate (Lactic Acid) 0.8 0.5 - 2.2 mmol/L   Brain natriuretic peptide    Collection Time: 10/22/24  1:36 PM   Result Value Ref Range    BNP 42 0 - 99 pg/mL   CBC auto differential    Collection Time: 10/22/24  1:36 PM   Result Value Ref Range    WBC 7.23 3.90 - 12.70 K/uL    RBC 4.69 4.00 - 5.40 M/uL    Hemoglobin 14.7 12.0 - 16.0 g/dL    Hematocrit 44.4 37.0 - 48.5 %    MCV 95 82 - 98 fL    MCH 31.3 (H) 27.0 - 31.0 pg    MCHC 33.1 32.0 - 36.0 g/dL    RDW 12.0 11.5 - 14.5 %    Platelets 301 150 - 450 K/uL    MPV 8.4 (L) 9.2 - 12.9 fL    Immature Granulocytes 0.6 (H) 0.0 - 0.5 %    Gran # (ANC) 4.0 1.8 - 7.7 K/uL    Immature Grans (Abs) 0.04 0.00 - 0.04 K/uL    Lymph # 2.4 1.0 - 4.8 K/uL    Mono # 0.5 0.3 - 1.0 K/uL    Eos # 0.2 0.0 - 0.5 K/uL    Baso # 0.07 0.00 - 0.20 K/uL    nRBC 0 0 /100 WBC    Gran % 55.2 38.0 - 73.0 %    Lymph % 33.1 18.0 - 48.0 %    Mono % 7.5 4.0 - 15.0 %    Eosinophil % 2.6 0.0 - 8.0 %    Basophil % 1.0 0.0 - 1.9 %    Differential Method Automated    Comprehensive metabolic panel    Collection Time: 10/22/24  1:36 PM   Result Value Ref Range    Sodium 141 136 - 145 mmol/L    Potassium 4.7 3.5 - 5.1 mmol/L    Chloride 111 (H) 95 - 110 mmol/L    CO2 24 23 - 29 mmol/L    Glucose 130 (H) 70 - 110 mg/dL    BUN 10 6 - 20 mg/dL    Creatinine 0.8 0.5 - 1.4 mg/dL    Calcium 10.4 8.7 - 10.5 mg/dL    Total Protein 7.6 6.0 - 8.4 g/dL    Albumin 4.3 3.5 - 5.2 g/dL    Total Bilirubin 1.2 (H) 0.1 - 1.0 mg/dL    Alkaline Phosphatase 55 40 - 150 U/L    AST 37 10 - 40 U/L    ALT 43 10 - 44 U/L    eGFR >60 >60 mL/min/1.73 m^2    Anion Gap 6 (L) 8 - 16 mmol/L   Magnesium    Collection Time: 10/22/24  1:36 PM   Result Value Ref Range    Magnesium 2.0  1.6 - 2.6 mg/dL   Phosphorus    Collection Time: 10/22/24  1:36 PM   Result Value Ref Range    Phosphorus 2.7 2.7 - 4.5 mg/dL   EKG 12-lead    Collection Time: 10/22/24  2:34 PM   Result Value Ref Range    QRS Duration 68 ms    [facility]HS QTC Calculation 415 ms   ISTAT PROCEDURE    Collection Time: 10/22/24  5:35 PM   Result Value Ref Range    POC PH 7.283 (LL) 7.35 - 7.45    POC PCO2 52.2 (H) 35 - 45 mmHg    POC PO2 42 40 - 60 mmHg    POC HCO3 24.7 24 - 28 mmol/L    POC BE -2 -2 to 2 mmol/L    POC SATURATED O2 71 95 - 100 %    POC TCO2 26 24 - 29 mmol/L    Sample VENOUS     Site Other     Allens Test N/A    Lactic acid, plasma    Collection Time: 10/22/24  5:36 PM   Result Value Ref Range    Lactate (Lactic Acid) 0.7 0.5 - 2.2 mmol/L   CBC Auto Differential    Collection Time: 10/23/24  4:35 AM   Result Value Ref Range    WBC 15.39 (H) 3.90 - 12.70 K/uL    RBC 4.20 4.00 - 5.40 M/uL    Hemoglobin 13.2 12.0 - 16.0 g/dL    Hematocrit 38.0 37.0 - 48.5 %    MCV 91 82 - 98 fL    MCH 31.4 (H) 27.0 - 31.0 pg    MCHC 34.7 32.0 - 36.0 g/dL    RDW 11.4 (L) 11.5 - 14.5 %    Platelets 356 150 - 450 K/uL    MPV 8.6 (L) 9.2 - 12.9 fL    Immature Granulocytes 0.4 0.0 - 0.5 %    Gran # (ANC) 12.3 (H) 1.8 - 7.7 K/uL    Immature Grans (Abs) 0.06 (H) 0.00 - 0.04 K/uL    Lymph # 2.1 1.0 - 4.8 K/uL    Mono # 0.9 0.3 - 1.0 K/uL    Eos # 0.0 0.0 - 0.5 K/uL    Baso # 0.06 0.00 - 0.20 K/uL    nRBC 0 0 /100 WBC    Gran % 79.8 (H) 38.0 - 73.0 %    Lymph % 13.7 (L) 18.0 - 48.0 %    Mono % 5.5 4.0 - 15.0 %    Eosinophil % 0.2 0.0 - 8.0 %    Basophil % 0.4 0.0 - 1.9 %    Differential Method Automated    Comprehensive Metabolic Panel    Collection Time: 10/23/24  4:35 AM   Result Value Ref Range    Sodium 139 136 - 145 mmol/L    Potassium 4.0 3.5 - 5.1 mmol/L    Chloride 107 95 - 110 mmol/L    CO2 24 23 - 29 mmol/L    Glucose 169 (H) 70 - 110 mg/dL    BUN 8 6 - 20 mg/dL    Creatinine 0.8 0.5 - 1.4 mg/dL    Calcium 8.7 8.7 - 10.5 mg/dL    Total Protein  6.3 6.0 - 8.4 g/dL    Albumin 3.8 3.5 - 5.2 g/dL    Total Bilirubin 0.9 0.1 - 1.0 mg/dL    Alkaline Phosphatase 47 40 - 150 U/L    AST 24 10 - 40 U/L    ALT 32 10 - 44 U/L    eGFR >60 >60 mL/min/1.73 m^2    Anion Gap 8 8 - 16 mmol/L   Fentanyl, Urine    Collection Time: 10/23/24  5:52 AM   Result Value Ref Range    Fentanyl, Urine Negative Negative    Creatinine, Urine 65.9 15.0 - 325.0 mg/dL       Microbiology Results (last 7 days)       ** No results found for the last 168 hours. **             Imaging Results              X-Ray Chest AP Portable (Final result)  Result time 10/21/24 18:37:52      Final result by Teodora Sears MD (10/21/24 18:37:52)                   Impression:      No acute cardiopulmonary process identified.      Electronically signed by: Teodora Sears MD  Date:    10/21/2024  Time:    18:37               Narrative:    EXAMINATION:  XR CHEST AP PORTABLE    CLINICAL HISTORY:  Poisoning by unspecified drugs, medicaments and biological substances, accidental (unintentional), initial encounter    TECHNIQUE:  Single frontal view of the chest was performed.    COMPARISON:  April 2023.    FINDINGS:  Cardiac silhouette is normal in size.  Lungs are symmetrically expanded.  No evidence of focal consolidative process, pneumothorax, or significant pleural effusion.  No acute osseous abnormality identified.                                             Assessment/Plan:      * Intentional overdose of beta-adrenergic blocking drug  Bradycardic and hypotensive  80-90 short acting propranolol taken  Presumably should wear off quick and intake was 10/21  Consult psych when appropriate  PEC'd, return to inpt psych wang when able  Atropine, CaGluc, and Epinephrine given  Consider high dose IV insulin if hemodynamically unstable      Polysubstance abuse  Noted hx  Has meth in her system  Consult psych when appropriate  PEC'd      Pseudoseizures  No seizure like activity on presentation       Cerebral  palsy  Noted      Personality disorder  Noted, consult psych when appropriate  PEC'd        VTE Risk Mitigation (From admission, onward)           Ordered     IP VTE LOW RISK PATIENT  Once         10/22/24 1256     Place sequential compression device  Until discontinued         10/22/24 1256                    Discharge Planning   YOVANNY:      Code Status: Full Code   Is the patient medically ready for discharge?:     Reason for patient still in hospital (select all that apply): Patient trending condition and Treatment  Discharge Plan A: Home with family            Critical care time spent on the evaluation and treatment of severe organ dysfunction, review of pertinent labs and imaging studies, discussions with consulting providers and discussions with patient/family: 35 minutes.      David Nieves MD  Department of Hospital Medicine   [facility] [place] - Intensive Care

## 2024-10-23 NOTE — ASSESSMENT & PLAN NOTE
This patient has shock. The type of shock is cardiogenic due to cardiotoxicity. The patient has the following evidence of shock: persistent hypotension. The patient will be admitted to an intensive care unit, they will be treated with epinephrine.  -lactate and mixed venous sat reassuring  -avoid further IVF as primary issue will be contractility related and not pre-load  -wean Epi for HR >60- may need for another 12-24 hours

## 2024-10-23 NOTE — ASSESSMENT & PLAN NOTE
Toxicology notes reviewed. Currently compensating well with epi alone and through the high risk time period. Will hold off on high dose insulin therapy.

## 2024-10-24 LAB
ALBUMIN SERPL BCP-MCNC: 3.7 G/DL (ref 3.5–5.2)
ALP SERPL-CCNC: 49 U/L (ref 40–150)
ALT SERPL W/O P-5'-P-CCNC: 26 U/L (ref 10–44)
ANION GAP SERPL CALC-SCNC: 8 MMOL/L (ref 8–16)
AST SERPL-CCNC: 17 U/L (ref 10–40)
BASOPHILS # BLD AUTO: 0.06 K/UL (ref 0–0.2)
BASOPHILS NFR BLD: 0.7 % (ref 0–1.9)
BILIRUB SERPL-MCNC: 0.4 MG/DL (ref 0.1–1)
BUN SERPL-MCNC: 9 MG/DL (ref 6–20)
CALCIUM SERPL-MCNC: 9 MG/DL (ref 8.7–10.5)
CHLORIDE SERPL-SCNC: 109 MMOL/L (ref 95–110)
CO2 SERPL-SCNC: 22 MMOL/L (ref 23–29)
CREAT SERPL-MCNC: 0.7 MG/DL (ref 0.5–1.4)
DIFFERENTIAL METHOD BLD: ABNORMAL
EOSINOPHIL # BLD AUTO: 0.2 K/UL (ref 0–0.5)
EOSINOPHIL NFR BLD: 2.1 % (ref 0–8)
ERYTHROCYTE [DISTWIDTH] IN BLOOD BY AUTOMATED COUNT: 11.7 % (ref 11.5–14.5)
EST. GFR  (NO RACE VARIABLE): >60 ML/MIN/1.73 M^2
GLUCOSE SERPL-MCNC: 154 MG/DL (ref 70–110)
HCT VFR BLD AUTO: 40.4 % (ref 37–48.5)
HGB BLD-MCNC: 13.7 G/DL (ref 12–16)
IMM GRANULOCYTES # BLD AUTO: 0.02 K/UL (ref 0–0.04)
IMM GRANULOCYTES NFR BLD AUTO: 0.2 % (ref 0–0.5)
LYMPHOCYTES # BLD AUTO: 2.5 K/UL (ref 1–4.8)
LYMPHOCYTES NFR BLD: 28.3 % (ref 18–48)
MCH RBC QN AUTO: 31.4 PG (ref 27–31)
MCHC RBC AUTO-ENTMCNC: 33.9 G/DL (ref 32–36)
MCV RBC AUTO: 93 FL (ref 82–98)
MONOCYTES # BLD AUTO: 0.7 K/UL (ref 0.3–1)
MONOCYTES NFR BLD: 7.6 % (ref 4–15)
NEUTROPHILS # BLD AUTO: 5.5 K/UL (ref 1.8–7.7)
NEUTROPHILS NFR BLD: 61.1 % (ref 38–73)
NRBC BLD-RTO: 0 /100 WBC
PLATELET # BLD AUTO: 298 K/UL (ref 150–450)
PMV BLD AUTO: 8.4 FL (ref 9.2–12.9)
POTASSIUM SERPL-SCNC: 3.7 MMOL/L (ref 3.5–5.1)
PROT SERPL-MCNC: 6.5 G/DL (ref 6–8.4)
RBC # BLD AUTO: 4.36 M/UL (ref 4–5.4)
SODIUM SERPL-SCNC: 139 MMOL/L (ref 136–145)
WBC # BLD AUTO: 8.99 K/UL (ref 3.9–12.7)

## 2024-10-24 PROCEDURE — S4991 NICOTINE PATCH NONLEGEND: HCPCS | Performed by: STUDENT IN AN ORGANIZED HEALTH CARE EDUCATION/TRAINING PROGRAM

## 2024-10-24 PROCEDURE — 80053 COMPREHEN METABOLIC PANEL: CPT | Performed by: STUDENT IN AN ORGANIZED HEALTH CARE EDUCATION/TRAINING PROGRAM

## 2024-10-24 PROCEDURE — 94761 N-INVAS EAR/PLS OXIMETRY MLT: CPT

## 2024-10-24 PROCEDURE — 99900035 HC TECH TIME PER 15 MIN (STAT)

## 2024-10-24 PROCEDURE — 63600175 PHARM REV CODE 636 W HCPCS: Performed by: INTERNAL MEDICINE

## 2024-10-24 PROCEDURE — 99233 SBSQ HOSP IP/OBS HIGH 50: CPT | Mod: 95,AF,HB, | Performed by: PSYCHIATRY & NEUROLOGY

## 2024-10-24 PROCEDURE — 11000001 HC ACUTE MED/SURG PRIVATE ROOM

## 2024-10-24 PROCEDURE — 85025 COMPLETE CBC W/AUTO DIFF WBC: CPT | Performed by: STUDENT IN AN ORGANIZED HEALTH CARE EDUCATION/TRAINING PROGRAM

## 2024-10-24 PROCEDURE — 25000003 PHARM REV CODE 250: Performed by: STUDENT IN AN ORGANIZED HEALTH CARE EDUCATION/TRAINING PROGRAM

## 2024-10-24 RX ORDER — ALPRAZOLAM 0.25 MG/1
0.25 TABLET ORAL 3 TIMES DAILY PRN
Status: DISCONTINUED | OUTPATIENT
Start: 2024-10-24 | End: 2024-10-25 | Stop reason: HOSPADM

## 2024-10-24 RX ORDER — VALPROIC ACID 250 MG/1
250 CAPSULE, LIQUID FILLED ORAL 2 TIMES DAILY
Status: DISCONTINUED | OUTPATIENT
Start: 2024-10-24 | End: 2024-10-25 | Stop reason: HOSPADM

## 2024-10-24 RX ORDER — MIDODRINE HYDROCHLORIDE 5 MG/1
5 TABLET ORAL EVERY 8 HOURS
Status: DISCONTINUED | OUTPATIENT
Start: 2024-10-24 | End: 2024-10-25 | Stop reason: HOSPADM

## 2024-10-24 RX ORDER — IBUPROFEN 200 MG
1 TABLET ORAL DAILY
Status: DISCONTINUED | OUTPATIENT
Start: 2024-10-24 | End: 2024-10-25 | Stop reason: HOSPADM

## 2024-10-24 RX ADMIN — MUPIROCIN: 20 OINTMENT TOPICAL at 08:10

## 2024-10-24 RX ADMIN — MIDODRINE HYDROCHLORIDE 5 MG: 5 TABLET ORAL at 08:10

## 2024-10-24 RX ADMIN — POTASSIUM BICARBONATE 50 MEQ: 977.5 TABLET, EFFERVESCENT ORAL at 05:10

## 2024-10-24 RX ADMIN — Medication 6 MG: at 08:10

## 2024-10-24 RX ADMIN — MIDODRINE HYDROCHLORIDE 5 MG: 5 TABLET ORAL at 01:10

## 2024-10-24 RX ADMIN — VALPROIC ACID 250 MG: 250 CAPSULE ORAL at 02:10

## 2024-10-24 RX ADMIN — VALPROIC ACID 250 MG: 250 CAPSULE ORAL at 08:10

## 2024-10-24 RX ADMIN — MIDAZOLAM HYDROCHLORIDE 1 MG: 1 INJECTION, SOLUTION INTRAMUSCULAR; INTRAVENOUS at 01:10

## 2024-10-24 RX ADMIN — MUPIROCIN: 20 OINTMENT TOPICAL at 09:10

## 2024-10-24 RX ADMIN — Medication 1 PATCH: at 01:10

## 2024-10-24 NOTE — NURSING
Patients moms arrives to bedside, patient gets annoyed not with her mother, but because she wants to go home with her kids. Security called to bedside, mother asked to leave. MD informed of occurrence. Patient given medication to help calm her.

## 2024-10-24 NOTE — PROGRESS NOTES
[facility] Medicine  Progress Note    Patient Name: Michela No  MRN: [id number]  Patient Class: IP- Inpatient   Admission Date: 10/21/2024  Length of Stay: 2 days  Attending Physician: David Nieves MD  Primary Care Provider: Elijah Beck MD        Subjective:     Principal Problem:Intentional overdose of beta-adrenergic blocking drug        HPI:    Michela No is a 29 y.o. female who has a past medical history of ADHD (attention deficit hyperactivity disorder), Allergy, Anxiety, Borderline personality, Cerebral palsy with spastic/ataxic diplegia, Depression, Genital herpes, Herpes simplex virus (HSV) infection, Mood disorder, PCOS (polycystic ovarian syndrome), Personality disorder, Pseudoseizures, Seizures, Self-harming behavior, and Substance abuse, presented to the ED after inentional overdose. Patient is drowsy and HPI largely from ED: She reported she took 80-90 tablets [propanolol] around 4pm yesterday, reports vomiting shortly thereafter, and presented suicidal. She was evaluated and cleared around midnight. Transferred to the psychiatric facility this morning after becoming agitated with zyprexa/ativan given. She was hypotensive and returned to us here, and has been bradycardic. I have been on the phone with poison center who stated peak action would be 6-8 hrs, however after discussion with tox, seems more consistent with bb overdose and could have delayed effect 24 hours out. Complicating this is reported meth abuse with last use yesterday. Repeat labwork is ordered, she is trialed on atropine/calcium and some slight improvement in her HR now to the low 70s, still mildly hypotensive on levophed peripherally at this point. Tox recommended change to epi gtt. If she shows signs of cardiac dysfunction, high dose insulin would be the next step.     Patient bradycardic to 30's and hypotensive to 70/41, received atropine 1 mg and Calcium gluconate and started on epinephrine  gtt. Poison control consulted and recs made.       Overview/Hospital Course:  Admitted after intentional BB o/d (80-90 pills). Initially patient was bradycardic to 30's and hypotensive to 70/41, received atropine 1 mg and Calcium gluconate and started on epinephrine gtt. Poison control consulted and recs made.  Crital Care consulted, appreciate assistance.    Interval History:  NAEON.  No new issues.   CC- HA  All questions answered and updates on care given.       ROS:  General: Negative for fevers   Cardiac: Negative for chest pain   Pulmonary: Negative for wheezing  GI: Negative for abdominal distention      Vitals:    10/24/24 1615 10/24/24 1630 10/24/24 1645 10/24/24 1700   BP: (!) 90/55 (!) 90/49 (!) 92/46 (!) 89/50   BP Location:       Patient Position:       Pulse: 73 75 74 73   Resp: 18 (!) 26  20   Temp:       TempSrc:       SpO2: 100% 97% 97% 96%   Weight:       Height:              Body mass index is 25.82 kg/m².      PHYSICAL EXAM:  Physical Exam  Constitutional:       Appearance: She is ill-appearing and toxic-appearing.      Comments: Drowsy   Eyes:      General: No scleral icterus.     Pupils: Pupils are equal, round, and reactive to light.   Cardiovascular:      Rate and Rhythm: Bradycardia present.      Heart sounds: No murmur heard.     No gallop.   Pulmonary:      Effort: No respiratory distress.      Breath sounds: No stridor. No wheezing.   Abdominal:      Tenderness: There is no abdominal tenderness. There is no guarding.   Musculoskeletal:         General: No swelling or deformity.   Skin:     Coloration: Skin is not jaundiced.      Findings: No bruising.   Neurological:      Mental Status: She is disoriented.      Cranial Nerves: No cranial nerve deficit.      Motor: No weakness.            Recent Results (from the past 24 hours)   CBC Auto Differential    Collection Time: 10/24/24  4:43 AM   Result Value Ref Range    WBC 8.99 3.90 - 12.70 K/uL    RBC 4.36 4.00 - 5.40 M/uL    Hemoglobin 13.7  12.0 - 16.0 g/dL    Hematocrit 40.4 37.0 - 48.5 %    MCV 93 82 - 98 fL    MCH 31.4 (H) 27.0 - 31.0 pg    MCHC 33.9 32.0 - 36.0 g/dL    RDW 11.7 11.5 - 14.5 %    Platelets 298 150 - 450 K/uL    MPV 8.4 (L) 9.2 - 12.9 fL    Immature Granulocytes 0.2 0.0 - 0.5 %    Gran # (ANC) 5.5 1.8 - 7.7 K/uL    Immature Grans (Abs) 0.02 0.00 - 0.04 K/uL    Lymph # 2.5 1.0 - 4.8 K/uL    Mono # 0.7 0.3 - 1.0 K/uL    Eos # 0.2 0.0 - 0.5 K/uL    Baso # 0.06 0.00 - 0.20 K/uL    nRBC 0 0 /100 WBC    Gran % 61.1 38.0 - 73.0 %    Lymph % 28.3 18.0 - 48.0 %    Mono % 7.6 4.0 - 15.0 %    Eosinophil % 2.1 0.0 - 8.0 %    Basophil % 0.7 0.0 - 1.9 %    Differential Method Automated    Comprehensive Metabolic Panel    Collection Time: 10/24/24  4:43 AM   Result Value Ref Range    Sodium 139 136 - 145 mmol/L    Potassium 3.7 3.5 - 5.1 mmol/L    Chloride 109 95 - 110 mmol/L    CO2 22 (L) 23 - 29 mmol/L    Glucose 154 (H) 70 - 110 mg/dL    BUN 9 6 - 20 mg/dL    Creatinine 0.7 0.5 - 1.4 mg/dL    Calcium 9.0 8.7 - 10.5 mg/dL    Total Protein 6.5 6.0 - 8.4 g/dL    Albumin 3.7 3.5 - 5.2 g/dL    Total Bilirubin 0.4 0.1 - 1.0 mg/dL    Alkaline Phosphatase 49 40 - 150 U/L    AST 17 10 - 40 U/L    ALT 26 10 - 44 U/L    eGFR >60 >60 mL/min/1.73 m^2    Anion Gap 8 8 - 16 mmol/L       Microbiology Results (last 7 days)       ** No results found for the last 168 hours. **             Imaging Results              X-Ray Chest AP Portable (Final result)  Result time 10/21/24 18:37:52      Final result by Teodora Sears MD (10/21/24 18:37:52)                   Impression:      No acute cardiopulmonary process identified.      Electronically signed by: Teodora Sears MD  Date:    10/21/2024  Time:    18:37               Narrative:    EXAMINATION:  XR CHEST AP PORTABLE    CLINICAL HISTORY:  Poisoning by unspecified drugs, medicaments and biological substances, accidental (unintentional), initial encounter    TECHNIQUE:  Single frontal view of the chest was  performed.    COMPARISON:  April 2023.    FINDINGS:  Cardiac silhouette is normal in size.  Lungs are symmetrically expanded.  No evidence of focal consolidative process, pneumothorax, or significant pleural effusion.  No acute osseous abnormality identified.                                             Assessment/Plan:      * Intentional overdose of beta-adrenergic blocking drug  Bradycardic and hypotensive  80-90 short acting propranolol taken  Presumably should wear off quick and intake was 10/21  Consult psych when appropriate  PEC'd, return to inpt psych wang when able  Atropine, CaGluc, and Epinephrine given  Consider high dose IV insulin if hemodynamically unstable      Polysubstance abuse  Noted hx  Has meth in her system  Consult psych when appropriate  PEC'd      Pseudoseizures  No seizure like activity on presentation       Cerebral palsy  Noted      Personality disorder  Noted, consult psych when appropriate  PEC'd        VTE Risk Mitigation (From admission, onward)           Ordered     IP VTE LOW RISK PATIENT  Once         10/22/24 1256     Place sequential compression device  Until discontinued         10/22/24 1256                    Discharge Planning   YOVANNY:      Code Status: Full Code   Is the patient medically ready for discharge?:     Reason for patient still in hospital (select all that apply): Patient trending condition and Treatment  Discharge Plan A: Home with family            Critical care time spent on the evaluation and treatment of severe organ dysfunction, review of pertinent labs and imaging studies, discussions with consulting providers and discussions with patient/family: 35 minutes.      David Nieves MD  Department of Hospital Medicine   [facility] - Intensive Care

## 2024-10-24 NOTE — PLAN OF CARE
Problem: Adult Inpatient Plan of Care  Goal: Plan of Care Review  Outcome: Progressing  Goal: Patient-Specific Goal (Individualized)  Outcome: Progressing  Goal: Absence of Hospital-Acquired Illness or Injury  Outcome: Progressing  Goal: Optimal Comfort and Wellbeing  Outcome: Progressing  Goal: Readiness for Transition of Care  Outcome: Progressing     Problem: Suicide Risk  Goal: Absence of Self-Harm  Outcome: Progressing     Problem: Infection  Goal: Absence of Infection Signs and Symptoms  Outcome: Progressing     Problem: Skin Injury Risk Increased  Goal: Skin Health and Integrity  Outcome: Progressing     Problem: Overdose  Goal: Optimal Coping  Outcome: Progressing  Goal: Absence of Bleeding  Outcome: Progressing  Goal: Stable Heart Rate and Rhythm  Outcome: Progressing  Goal: Fluid Balance  Outcome: Progressing  Goal: Effective Tissue Perfusion  Outcome: Progressing  Goal: Optimal Neurologic Function  Outcome: Progressing  Goal: Effective Oxygenation and Ventilation  Outcome: Progressing

## 2024-10-24 NOTE — CONSULTS
[facility]  Psychiatry  Telepsychiatry Consult Note    Please see previous notes:    Patient agreeable to consultation via telepsychiatry.    Tele-Consultation from Psychiatry started: 10/24/2024 at 1401  The chief complaint leading to psychiatric consultation is: SI  This consultation was requested by the Emergency Department attending physician.  The location of the consulting psychiatrist is  [facility] .  The patient location is  [facility] ICU   Also present with the patient at the time of the consultation: rn    Patient Identification:   Michela No is a 29 y.o. female.    Patient information was obtained from patient.    Consults  Teleconsult Time Documentation  Subjective:     History of Present Illness:  No notes on file Michela oN is a 29 y.o. female who has a past medical history of ADHD (attention deficit hyperactivity disorder), Allergy, Anxiety, Borderline personality, Cerebral palsy with spastic/ataxic diplegia, Depression, Genital herpes, Herpes simplex virus (HSV) infection, Mood disorder, PCOS (polycystic ovarian syndrome), Personality disorder, Pseudoseizures, Seizures, Self-harming behavior, and Substance abuse, presented to the ED after inentional overdose. Patient is drowsy and HPI largely from ED: She reported she took 80-90 tablets [propanolol] around 4pm yesterday, reports vomiting shortly thereafter, and presented suicidal.     Psychiatric History:   Previous Psychiatric Hospitalizations: Yes   Previous Medication Trials: Yes   Previous Suicide Attempts: yes   History of Violence: no  History of Depression: yes  History of Nellie: no  History of Auditory/Visual Hallucination no  History of Delusions: no  Outpatient psychiatrist (current & past): No    Substance Abuse History:  Tobacco:No  Alcohol: No  Illicit Substances:No  Detox/Rehab: No    Legal History: Past charges/incarcerations: No     Family Psychiatric History: denies      Social  "History:  Developmental/Childhood:Achieved all developmental milestones timely  *Education:High School Diploma  Employment Status/Finances:Employed   Relationship Status/Sexual Orientation: Partnered: Relationship strained  Children: 2  Housing Status: Home    history:  NO  Access to gun: NO  Jew:Spiritual without formal affiliation  Recreational activities:Exercise    Psychiatric Mental Status Exam:  Arousal: alert  Sensorium/Orientation: oriented to grossly intact  Behavior/Cooperation: normal, cooperative   Speech: normal tone, normal rate, normal pitch, normal volume  Language: grossly intact  Mood: " ok "   Affect: labile  Thought Process: illogical  Thought Content:   Auditory hallucinations: NO  Visual hallucinations: NO  Paranoia: NO  Delusions:  NO  Suicidal ideation: YES:      Homicidal ideation: NO  Attention/Concentration:  intact  Memory:    Recent:  Intact   Remote: Intact   3/3 immediate, 3/3 at 5 min  Fund of Knowledge: Aware of current events   Abstract reasoning: proverbs were abstract  Insight: intact  Judgment: behavior is adequate to circumstances      Past Medical History:   Past Medical History:   Diagnosis Date    ADHD (attention deficit hyperactivity disorder)     Allergy     Anxiety     Borderline personality     Cerebral palsy with spastic/ataxic diplegia     Depression     Genital herpes 02/15/2015    Headache(784.0)     Hemiparesis     since infancy due to shaking.    Herpes simplex virus (HSV) infection     Intentional overdose of beta-adrenergic blocking drug 10/22/2024    Mood disorder 07/25/2013    PCOS (polycystic ovarian syndrome)     as per pt    Personality disorder 09/30/2012    Pseudoseizures 07/29/2013    Seizures 2010    Self-harming behavior     Substance abuse     Suicide attempt 10/22/2024      Laboratory Data:   Labs Reviewed   CBC W/ AUTO DIFFERENTIAL - Abnormal       Result Value    WBC 11.39      RBC 4.84      Hemoglobin 15.3      Hematocrit 44.5      MCV " 92      MCH 31.6 (*)     MCHC 34.4      RDW 11.8      Platelets 393      MPV 8.4 (*)     Immature Granulocytes 0.2      Gran # (ANC) 8.2 (*)     Immature Grans (Abs) 0.02      Lymph # 2.4      Mono # 0.7      Eos # 0.1      Baso # 0.08      nRBC 0      Gran % 71.7      Lymph % 21.0      Mono % 6.0      Eosinophil % 0.4      Basophil % 0.7      Differential Method Automated     COMPREHENSIVE METABOLIC PANEL - Abnormal    Sodium 139      Potassium 4.2      Chloride 105      CO2 24      Glucose 106      BUN 15      Creatinine 0.9      Calcium 10.2      Total Protein 8.5 (*)     Albumin 4.9      Total Bilirubin 0.8      Alkaline Phosphatase 63      AST 46 (*)     ALT 43      eGFR >60      Anion Gap 10     URINALYSIS, REFLEX TO URINE CULTURE - Abnormal    Specimen UA Urine, Clean Catch      Color, UA Yellow      Appearance, UA Hazy (*)     pH, UA 7.0      Specific Gravity, UA 1.010      Protein, UA Negative      Glucose, UA Negative      Ketones, UA Negative      Bilirubin (UA) Negative      Occult Blood UA Negative      Nitrite, UA Negative      Urobilinogen, UA Negative      Leukocytes, UA Trace (*)     Narrative:     Specimen Source->Urine   DRUG SCREEN PANEL, URINE EMERGENCY - Abnormal    Benzodiazepines Negative      Methadone metabolites Negative      Cocaine (Metab.) Negative      Opiate Scrn, Ur Negative      Barbiturate Screen, Ur Negative      Amphetamine Screen, Ur Presumptive Positive (*)     THC Negative      Phencyclidine Negative      Creatinine, Urine 40.2      Toxicology Information SEE COMMENT      Narrative:     Specimen Source->Urine   ACETAMINOPHEN LEVEL - Abnormal    Acetaminophen (Tylenol), Serum <3.0 (*)    SALICYLATE LEVEL - Abnormal    Salicylate Lvl <5.0 (*)    CBC W/ AUTO DIFFERENTIAL - Abnormal    WBC 7.23      RBC 4.69      Hemoglobin 14.7      Hematocrit 44.4      MCV 95      MCH 31.3 (*)     MCHC 33.1      RDW 12.0      Platelets 301      MPV 8.4 (*)     Immature Granulocytes 0.6 (*)      Gran # (ANC) 4.0      Immature Grans (Abs) 0.04      Lymph # 2.4      Mono # 0.5      Eos # 0.2      Baso # 0.07      nRBC 0      Gran % 55.2      Lymph % 33.1      Mono % 7.5      Eosinophil % 2.6      Basophil % 1.0      Differential Method Automated      Narrative:     Collection has been rescheduled by DVD at 10/22/2024 13:00 Reason:   Unable to collect nurse beverley pelaez  Collection has been rescheduled by MLR2 at 10/22/2024 13:18 Reason:   Unable to collect very hard stick  Nurse was notified.   POCT GLUCOSE - Abnormal    POCT Glucose 129 (*)    TSH    TSH 3.173     ALCOHOL,MEDICAL (ETHANOL)    Alcohol, Serum <10     HCG, QUANTITATIVE   HCG, QUANTITATIVE    HCG Quant <1.2     URINALYSIS MICROSCOPIC    WBC, UA 3      Squam Epithel, UA 14      Microscopic Comment SEE COMMENT      Narrative:     Specimen Source->Urine   TROPONIN I    Troponin I <0.006     POCT URINE PREGNANCY    POC Preg Test, Ur Negative       Acceptable Yes     SARS-COV-2 RDRP GENE    POC Rapid COVID Negative       Acceptable Yes     POCT GLUCOSE    POCT Glucose 86     POCT GLUCOSE MONITORING CONTINUOUS       Neurological History:  Seizures: No  Head trauma: No    Allergies:   Review of patient's allergies indicates:   Allergen Reactions    Fish containing products Swelling       Medications in ER:   Medications   insulin aspart U-100 pen 0-5 Units (has no administration in time range)   albuterol-ipratropium 2.5 mg-0.5 mg/3 mL nebulizer solution 3 mL (has no administration in time range)   melatonin tablet 6 mg (6 mg Oral Not Given 10/23/24 2116)   ondansetron disintegrating tablet 8 mg (has no administration in time range)   prochlorperazine injection Soln 5 mg (has no administration in time range)   polyethylene glycol packet 17 g (has no administration in time range)   senna-docusate 8.6-50 mg per tablet 1 tablet (has no administration in time range)   simethicone chewable tablet 80 mg (has no administration  in time range)   acetaminophen tablet 650 mg (650 mg Oral Given 10/23/24 1940)   naloxone 0.4 mg/mL injection 0.02 mg (has no administration in time range)   potassium bicarbonate disintegrating tablet 50 mEq (50 mEq Oral Given 10/24/24 0544)   potassium bicarbonate disintegrating tablet 35 mEq (has no administration in time range)   potassium bicarbonate disintegrating tablet 60 mEq (has no administration in time range)   magnesium oxide tablet 800 mg (has no administration in time range)   magnesium oxide tablet 800 mg (has no administration in time range)   potassium, sodium phosphates 280-160-250 mg packet 2 packet (has no administration in time range)   potassium, sodium phosphates 280-160-250 mg packet 2 packet (has no administration in time range)   potassium, sodium phosphates 280-160-250 mg packet 2 packet (has no administration in time range)   glucose chewable tablet 16 g (has no administration in time range)   glucose chewable tablet 24 g (has no administration in time range)   glucagon (human recombinant) injection 1 mg (has no administration in time range)   atropine injection 1 mg (1 mg Intravenous Not Given 10/22/24 2213)   sodium chloride 0.9% flush 10 mL (has no administration in time range)   mupirocin 2 % ointment ( Nasal Given 10/24/24 0900)   midazolam (PF) (VERSED) 1 mg/mL injection 1 mg (1 mg Intravenous Given 10/24/24 1321)   nicotine 21 mg/24 hr 1 patch (1 patch Transdermal Patch Applied 10/24/24 1350)   midodrine tablet 5 mg (5 mg Oral Given 10/24/24 1350)   ALPRAZolam tablet 0.25 mg (has no administration in time range)   valproic acid capsule 250 mg (250 mg Oral Given 10/24/24 1417)   OLANZapine injection 10 mg (10 mg Intramuscular Given 10/22/24 0820)   LORazepam injection 1 mg (1 mg Intramuscular Given 10/22/24 0820)   0.9%  NaCl infusion (0 mLs Intravenous Stopped 10/22/24 1042)   0.9%  NaCl infusion (1,000 mLs Intravenous New Bag 10/22/24 1047)   atropine injection 1 mg (1 mg  Intravenous Given 10/22/24 1130)   calcium gluconate 1 g in NS IVPB (premixed) (0 g Intravenous Stopped 10/22/24 1233)   ibuprofen tablet 600 mg (600 mg Oral Given 10/23/24 0609)       Medications at home: propranolol    No new subjective & objective note has been filed under this hospital service since the last note was generated.      Assessment - Diagnosis - Goals:     Diagnosis/Impression: borderline personality disorder, unspecified depressive disorder    Rec: continue PEC and inpt treatment - pt had significant OD resulting in ICU admission. Pt states she was upset with BF and took a bunch of pills impulsively. Hx of SI and SA.      Plan of Care communicated to: md via chat    Time with patient: 23 min      More than 50% of the time was spent counseling/coordinating care    Consulting clinician was informed of the encounter and consult note.    Consultation ended: 10/24/2024 at 1510    Catalino Fisher MD   Psychiatry  [facility]

## 2024-10-24 NOTE — PLAN OF CARE
Pt remains free from falls and injuries. 1:1 sitter, safety precautions remain. PICC, PIVs remain. Epi gtt infusing, titrated to maintain SBP>100. Tylenol x1 for headache. K replaced. VSS, no acute issues overnight. Plan of care reviewed with pt, no family at bedside.

## 2024-10-24 NOTE — NURSING
[facility], [facility]  Nurses Note -- 4 Eyes      10/24/2024       Skin assessed on: Q Shift      [x] No Pressure Injuries Present    [x]Prevention Measures Documented    [] Yes LDA  for Pressure Injury Previously documented     [] Yes New Pressure Injury Discovered   [] LDA for New Pressure Injury Added      Attending RN:  Alisson Bruno RN     Second RN:  Madonna Pa RN

## 2024-10-24 NOTE — NURSING
[facility], [facility]  Nurses Note -- 4 Eyes      10/24/2024       Skin assessed on: Q Shift      [x] No Pressure Injuries Present    [x]Prevention Measures Documented    [] Yes LDA  for Pressure Injury Previously documented     [] Yes New Pressure Injury Discovered   [] LDA for New Pressure Injury Added      Attending RN:  SNEHA CODY RN     Second RN:  WYATT Vinson

## 2024-10-25 VITALS
OXYGEN SATURATION: 98 % | HEIGHT: 66 IN | SYSTOLIC BLOOD PRESSURE: 91 MMHG | BODY MASS INDEX: 25.71 KG/M2 | RESPIRATION RATE: 12 BRPM | TEMPERATURE: 98 F | HEART RATE: 92 BPM | DIASTOLIC BLOOD PRESSURE: 50 MMHG | WEIGHT: 160 LBS

## 2024-10-25 LAB
ALBUMIN SERPL BCP-MCNC: 3.7 G/DL (ref 3.5–5.2)
ALP SERPL-CCNC: 45 U/L (ref 40–150)
ALT SERPL W/O P-5'-P-CCNC: 21 U/L (ref 10–44)
ANION GAP SERPL CALC-SCNC: 9 MMOL/L (ref 8–16)
AST SERPL-CCNC: 15 U/L (ref 10–40)
BASOPHILS # BLD AUTO: 0.05 K/UL (ref 0–0.2)
BASOPHILS NFR BLD: 0.6 % (ref 0–1.9)
BILIRUB SERPL-MCNC: 0.4 MG/DL (ref 0.1–1)
BUN SERPL-MCNC: 12 MG/DL (ref 6–20)
CALCIUM SERPL-MCNC: 10 MG/DL (ref 8.7–10.5)
CHLORIDE SERPL-SCNC: 105 MMOL/L (ref 95–110)
CO2 SERPL-SCNC: 27 MMOL/L (ref 23–29)
CREAT SERPL-MCNC: 0.6 MG/DL (ref 0.5–1.4)
DIFFERENTIAL METHOD BLD: ABNORMAL
EOSINOPHIL # BLD AUTO: 0.3 K/UL (ref 0–0.5)
EOSINOPHIL NFR BLD: 3 % (ref 0–8)
ERYTHROCYTE [DISTWIDTH] IN BLOOD BY AUTOMATED COUNT: 11.8 % (ref 11.5–14.5)
EST. GFR  (NO RACE VARIABLE): >60 ML/MIN/1.73 M^2
GLUCOSE SERPL-MCNC: 90 MG/DL (ref 70–110)
HCT VFR BLD AUTO: 44.5 % (ref 37–48.5)
HGB BLD-MCNC: 14.8 G/DL (ref 12–16)
IMM GRANULOCYTES # BLD AUTO: 0.03 K/UL (ref 0–0.04)
IMM GRANULOCYTES NFR BLD AUTO: 0.4 % (ref 0–0.5)
LYMPHOCYTES # BLD AUTO: 2 K/UL (ref 1–4.8)
LYMPHOCYTES NFR BLD: 24.4 % (ref 18–48)
MCH RBC QN AUTO: 31.2 PG (ref 27–31)
MCHC RBC AUTO-ENTMCNC: 33.3 G/DL (ref 32–36)
MCV RBC AUTO: 94 FL (ref 82–98)
MONOCYTES # BLD AUTO: 0.6 K/UL (ref 0.3–1)
MONOCYTES NFR BLD: 6.7 % (ref 4–15)
NEUTROPHILS # BLD AUTO: 5.4 K/UL (ref 1.8–7.7)
NEUTROPHILS NFR BLD: 64.9 % (ref 38–73)
NRBC BLD-RTO: 0 /100 WBC
PLATELET # BLD AUTO: 271 K/UL (ref 150–450)
PMV BLD AUTO: 8.6 FL (ref 9.2–12.9)
POTASSIUM SERPL-SCNC: 4.2 MMOL/L (ref 3.5–5.1)
PROT SERPL-MCNC: 6.8 G/DL (ref 6–8.4)
RBC # BLD AUTO: 4.74 M/UL (ref 4–5.4)
SODIUM SERPL-SCNC: 141 MMOL/L (ref 136–145)
WBC # BLD AUTO: 8.37 K/UL (ref 3.9–12.7)

## 2024-10-25 PROCEDURE — 80053 COMPREHEN METABOLIC PANEL: CPT | Performed by: STUDENT IN AN ORGANIZED HEALTH CARE EDUCATION/TRAINING PROGRAM

## 2024-10-25 PROCEDURE — 25000003 PHARM REV CODE 250: Performed by: STUDENT IN AN ORGANIZED HEALTH CARE EDUCATION/TRAINING PROGRAM

## 2024-10-25 PROCEDURE — 85025 COMPLETE CBC W/AUTO DIFF WBC: CPT | Performed by: STUDENT IN AN ORGANIZED HEALTH CARE EDUCATION/TRAINING PROGRAM

## 2024-10-25 PROCEDURE — S4991 NICOTINE PATCH NONLEGEND: HCPCS | Performed by: STUDENT IN AN ORGANIZED HEALTH CARE EDUCATION/TRAINING PROGRAM

## 2024-10-25 RX ORDER — VALPROIC ACID 250 MG/1
250 CAPSULE, LIQUID FILLED ORAL 2 TIMES DAILY
Status: ON HOLD
Start: 2024-10-25 | End: 2025-10-25

## 2024-10-25 RX ADMIN — MUPIROCIN: 20 OINTMENT TOPICAL at 08:10

## 2024-10-25 RX ADMIN — VALPROIC ACID 250 MG: 250 CAPSULE ORAL at 08:10

## 2024-10-25 RX ADMIN — ALPRAZOLAM 0.25 MG: 0.25 TABLET ORAL at 12:10

## 2024-10-25 RX ADMIN — MIDODRINE HYDROCHLORIDE 5 MG: 5 TABLET ORAL at 07:10

## 2024-10-25 RX ADMIN — MIDODRINE HYDROCHLORIDE 5 MG: 5 TABLET ORAL at 02:10

## 2024-10-25 NOTE — NURSING
Also note, original PEC given to [facility]1 [facility] to bring to [facility] .  All patient belongings given to EM1 [facility] unit.    Patient transported from [facility] at 16:18.    Will continue to f/u.

## 2024-10-25 NOTE — NURSING
Discharge Preparation:  Note that patient is awake, alert, fully oriented and denies pain at this time.  Patient on CEC and will be transported to [facility] by ambulance with an expected arrival time at 15:00.  Case management coordination of service complete.    Four IV's (including R PICC) removed with no bleeding to IV insertion site.    Personal belongings at nurses' station and will be handed over to ambulance at their arrival.    Will continue to f/u.

## 2024-10-25 NOTE — PLAN OF CARE
Roberta received for psyc placement.  ADT 32 placed.       10/25/24 0804   Post-Acute Status   Post-Acute Authorization Placement   Post-Acute Placement Status Referrals Sent   Discharge Delays   (Payc placement)   Discharge Plan   Discharge Plan A Psychiatric hospital

## 2024-10-25 NOTE — NURSING
[facility], [facility]  Nurses Note -- 4 Eyes      10/25/2024       Skin assessed on: Q Shift      [x] No Pressure Injuries Present    [x]Prevention Measures Documented    [] Yes LDA  for Pressure Injury Previously documented     [] Yes New Pressure Injury Discovered   [] LDA for New Pressure Injury Added      Attending RN:  Faith Stover RN     Second RN:  WYATT Wood

## 2024-10-25 NOTE — NURSING
Note that Poison Control ([facility]) called [facility]/ICU for update as to patient's condition.     Update given per policy.

## 2024-10-25 NOTE — PLAN OF CARE
Problem: Adult Inpatient Plan of Care  Goal: Plan of Care Review  Outcome: Met  Goal: Patient-Specific Goal (Individualized)  Outcome: Met  Goal: Absence of Hospital-Acquired Illness or Injury  Outcome: Met  Goal: Optimal Comfort and Wellbeing  Outcome: Met  Goal: Readiness for Transition of Care  Outcome: Met     Problem: Suicide Risk  Goal: Absence of Self-Harm  Outcome: Met     Problem: Infection  Goal: Absence of Infection Signs and Symptoms  Outcome: Met     Problem: Skin Injury Risk Increased  Goal: Skin Health and Integrity  Outcome: Met     Problem: Overdose  Goal: Optimal Coping  Outcome: Met  Goal: Absence of Bleeding  Outcome: Met  Goal: Stable Heart Rate and Rhythm  Outcome: Met  Goal: Fluid Balance  Outcome: Met  Goal: Effective Tissue Perfusion  Outcome: Met  Goal: Optimal Neurologic Function  Outcome: Met  Goal: Effective Oxygenation and Ventilation  Outcome: Met

## 2024-10-25 NOTE — NURSING
Discharge Preparation:  Followed up with [facility] [phone]. Spoke to Sara who shard that [facility] unit is enroute to [facility] to transport patient to psychiatric hospital.     Notified Charge Nel.    Will continue to f/u.

## 2024-10-25 NOTE — PLAN OF CARE
Instructions:    1. No changes to medication today. 2. Schedule an echocardiogram (ultrasound of the heart). You should receive a call from scheduling to set up the appointment. Call our office at [phone] with any questions or concerns. Neuro: AO X 4    Cardiac: SR    Respiratory: RA    GI: Regular diet    : Up to BSC    Lines: PIV X 3; PICC    GTT: None    Events: Sitter at bedside. Room safe and secure in accordance with PEC requirements. No episodes of agitation.          Problem: Adult Inpatient Plan of Care  Goal: Plan of Care Review  Outcome: Progressing  Goal: Patient-Specific Goal (Individualized)  Outcome: Progressing  Goal: Absence of Hospital-Acquired Illness or Injury  Outcome: Progressing  Goal: Optimal Comfort and Wellbeing  Outcome: Progressing  Goal: Readiness for Transition of Care  Outcome: Progressing     Problem: Suicide Risk  Goal: Absence of Self-Harm  Outcome: Progressing     Problem: Infection  Goal: Absence of Infection Signs and Symptoms  Outcome: Progressing

## 2024-10-25 NOTE — PLAN OF CARE
Case Management Final Discharge Note      Discharge Disposition: [facility]      Transportation: per nurseFaith  scheduled for 3pm        Patient and family educated on discharge services and updated on DC plan. Bedside RNFaith notified, patient clear to discharge from Case Management Perspective.        10/25/24 1317   Final Note   Assessment Type Final Discharge Note   Anticipated Discharge Disposition Psych   Post-Acute Status   Post-Acute Placement Status Set-up Complete/Auth obtained   Discharge Delays (!) Ambulance Transport/Facility Transport

## 2024-10-25 NOTE — NURSING
Original CEC given to EM1 RORY to bring to [facility].     Will continue to f/u.

## 2024-10-25 NOTE — NURSING
"Note that patient awake, alert, anxious and crying. Patient constantly asking, "if her boyfriend can come to the hospital and visit with her?" Also asking about her children and if she can have her glasses?"    BP continues 86 - 90's systolic. Asymptomatic.   Up to BSC requiring one-person assist. Urinated 300 cc yellow urine.     Allowed patient to vent. Informed her that she cannot have visitors at this time. Reminded her about legal status ([facility]) and that she will transfer later today.    Given PRN alprazolam for anxiety.   "

## 2024-10-25 NOTE — PROGRESS NOTES
[facility] Medicine  Progress Note    Patient Name: Michela No  MRN: [id number]  Patient Class: IP- Inpatient   Admission Date: 10/21/2024  Length of Stay: 3 days  Attending Physician: David Nieves MD  Primary Care Provider: Elijah Beck MD        Subjective:     Principal Problem:Intentional overdose of beta-adrenergic blocking drug        HPI:    Michela No is a 29 y.o. female who has a past medical history of ADHD (attention deficit hyperactivity disorder), Allergy, Anxiety, Borderline personality, Cerebral palsy with spastic/ataxic diplegia, Depression, Genital herpes, Herpes simplex virus (HSV) infection, Mood disorder, PCOS (polycystic ovarian syndrome), Personality disorder, Pseudoseizures, Seizures, Self-harming behavior, and Substance abuse, presented to the ED after inentional overdose. Patient is drowsy and HPI largely from ED: She reported she took 80-90 tablets [propanolol] around 4pm yesterday, reports vomiting shortly thereafter, and presented suicidal. She was evaluated and cleared around midnight. Transferred to the psychiatric facility this morning after becoming agitated with zyprexa/ativan given. She was hypotensive and returned to us here, and has been bradycardic. I have been on the phone with poison center who stated peak action would be 6-8 hrs, however after discussion with tox, seems more consistent with bb overdose and could have delayed effect 24 hours out. Complicating this is reported meth abuse with last use yesterday. Repeat labwork is ordered, she is trialed on atropine/calcium and some slight improvement in her HR now to the low 70s, still mildly hypotensive on levophed peripherally at this point. Tox recommended change to epi gtt. If she shows signs of cardiac dysfunction, high dose insulin would be the next step.     Patient bradycardic to 30's and hypotensive to 70/41, received atropine 1 mg and Calcium gluconate and started on epinephrine  "gtt. Poison control consulted and recs made.       Overview/Hospital Course:  Admitted after intentional BB o/d (80-90 pills). Initially patient was bradycardic to 30's and hypotensive to 70/41, received atropine 1 mg and Calcium gluconate and started on epinephrine gtt. Poison control consulted and recs made.  Crital Care consulted, appreciate assistance.    MEDICALLY READY FOR PSYCH PLACEMENT     Interval History:  NAEON.  No new issues.   CC- HA  All questions answered and updates on care given.       ROS:  General: Negative for fevers   Cardiac: Negative for chest pain   Pulmonary: Negative for wheezing  GI: Negative for abdominal distention      Vitals:    10/24/24 2230 10/25/24 0000 10/25/24 0100 10/25/24 0400   BP:  (!) 85/54 (!) 98/57 (!) 90/54   BP Location:       Patient Position:       Pulse:  (!) 57 (!) 59 68   Resp:  14 (!) 43 15   Temp:  98.6 °F (37 °C)  98.4 °F (36.9 °C)   TempSrc:  Oral  Oral   SpO2:  98% 97% 98%   Weight:       Height: 5' 6" (1.676 m)             Body mass index is 25.82 kg/m².      PHYSICAL EXAM:  Physical Exam  Constitutional:       Appearance: She is ill-appearing and toxic-appearing.      Comments: Drowsy   Eyes:      General: No scleral icterus.     Pupils: Pupils are equal, round, and reactive to light.   Cardiovascular:      Rate and Rhythm: Bradycardia present.      Heart sounds: No murmur heard.     No gallop.   Pulmonary:      Effort: No respiratory distress.      Breath sounds: No stridor. No wheezing.   Abdominal:      Tenderness: There is no abdominal tenderness. There is no guarding.   Musculoskeletal:         General: No swelling or deformity.   Skin:     Coloration: Skin is not jaundiced.      Findings: No bruising.   Neurological:      Mental Status: She is disoriented.      Cranial Nerves: No cranial nerve deficit.      Motor: No weakness.            Recent Results (from the past 24 hours)   CBC Auto Differential    Collection Time: 10/25/24  5:05 AM   Result Value " Ref Range    WBC 8.37 3.90 - 12.70 K/uL    RBC 4.74 4.00 - 5.40 M/uL    Hemoglobin 14.8 12.0 - 16.0 g/dL    Hematocrit 44.5 37.0 - 48.5 %    MCV 94 82 - 98 fL    MCH 31.2 (H) 27.0 - 31.0 pg    MCHC 33.3 32.0 - 36.0 g/dL    RDW 11.8 11.5 - 14.5 %    Platelets 271 150 - 450 K/uL    MPV 8.6 (L) 9.2 - 12.9 fL    Immature Granulocytes 0.4 0.0 - 0.5 %    Gran # (ANC) 5.4 1.8 - 7.7 K/uL    Immature Grans (Abs) 0.03 0.00 - 0.04 K/uL    Lymph # 2.0 1.0 - 4.8 K/uL    Mono # 0.6 0.3 - 1.0 K/uL    Eos # 0.3 0.0 - 0.5 K/uL    Baso # 0.05 0.00 - 0.20 K/uL    nRBC 0 0 /100 WBC    Gran % 64.9 38.0 - 73.0 %    Lymph % 24.4 18.0 - 48.0 %    Mono % 6.7 4.0 - 15.0 %    Eosinophil % 3.0 0.0 - 8.0 %    Basophil % 0.6 0.0 - 1.9 %    Differential Method Automated    Comprehensive Metabolic Panel    Collection Time: 10/25/24  5:05 AM   Result Value Ref Range    Sodium 141 136 - 145 mmol/L    Potassium 4.2 3.5 - 5.1 mmol/L    Chloride 105 95 - 110 mmol/L    CO2 27 23 - 29 mmol/L    Glucose 90 70 - 110 mg/dL    BUN 12 6 - 20 mg/dL    Creatinine 0.6 0.5 - 1.4 mg/dL    Calcium 10.0 8.7 - 10.5 mg/dL    Total Protein 6.8 6.0 - 8.4 g/dL    Albumin 3.7 3.5 - 5.2 g/dL    Total Bilirubin 0.4 0.1 - 1.0 mg/dL    Alkaline Phosphatase 45 40 - 150 U/L    AST 15 10 - 40 U/L    ALT 21 10 - 44 U/L    eGFR >60 >60 mL/min/1.73 m^2    Anion Gap 9 8 - 16 mmol/L       Microbiology Results (last 7 days)       ** No results found for the last 168 hours. **             Imaging Results              X-Ray Chest AP Portable (Final result)  Result time 10/21/24 18:37:52      Final result by Teodora Sears MD (10/21/24 18:37:52)                   Impression:      No acute cardiopulmonary process identified.      Electronically signed by: Teodora Sears MD  Date:    10/21/2024  Time:    18:37               Narrative:    EXAMINATION:  XR CHEST AP PORTABLE    CLINICAL HISTORY:  Poisoning by unspecified drugs, medicaments and biological substances, accidental  (unintentional), initial encounter    TECHNIQUE:  Single frontal view of the chest was performed.    COMPARISON:  April 2023.    FINDINGS:  Cardiac silhouette is normal in size.  Lungs are symmetrically expanded.  No evidence of focal consolidative process, pneumothorax, or significant pleural effusion.  No acute osseous abnormality identified.                                             Assessment/Plan:      * Intentional overdose of beta-adrenergic blocking drug  Bradycardic and hypotensive  80-90 short acting propranolol taken  Presumably should wear off quick and intake was 10/21  Consult psych when appropriate  PEC'd, return to inpt psych wang when able  Atropine, CaGluc, and Epinephrine given  Consider high dose IV insulin if hemodynamically unstable      Polysubstance abuse  Noted hx  Has meth in her system  Consult psych when appropriate  PEC'd      Pseudoseizures  No seizure like activity on presentation       Cerebral palsy  Noted      Personality disorder  Noted, consult psych when appropriate  PEC'd        VTE Risk Mitigation (From admission, onward)           Ordered     IP VTE LOW RISK PATIENT  Once         10/22/24 1256     Place sequential compression device  Until discontinued         10/22/24 1256                    Discharge Planning   YOVANNY:      Code Status: Full Code   Is the patient medically ready for discharge?:     Reason for patient still in hospital (select all that apply): Patient trending condition and Treatment  Discharge Plan A: Home with family            Critical care time spent on the evaluation and treatment of severe organ dysfunction, review of pertinent labs and imaging studies, discussions with consulting providers and discussions with patient/family: 35 minutes.      David Nieves MD  Department of Hospital Medicine   [facility] - Intensive Care

## 2024-10-25 NOTE — DISCHARGE SUMMARY
[facility] Medicine  Discharge Summary      Patient Name: Michela No  MRN: [id number]  DONG: [id number]  Patient Class: IP- Inpatient  Admission Date: 10/21/2024  Hospital Length of Stay: 3 days  Discharge Date and Time:  10/25/2024 2:52 PM  Attending Physician: David Nieves MD   Discharging Provider: David Nieves MD  Primary Care Provider: Elijah Beck MD    Primary Care Team: Networked reference to record PCT     HPI:     Michela No is a 29 y.o. female who has a past medical history of ADHD (attention deficit hyperactivity disorder), Allergy, Anxiety, Borderline personality, Cerebral palsy with spastic/ataxic diplegia, Depression, Genital herpes, Herpes simplex virus (HSV) infection, Mood disorder, PCOS (polycystic ovarian syndrome), Personality disorder, Pseudoseizures, Seizures, Self-harming behavior, and Substance abuse, presented to the ED after inentional overdose. Patient is drowsy and HPI largely from ED: She reported she took 80-90 tablets [propanolol] around 4pm yesterday, reports vomiting shortly thereafter, and presented suicidal. She was evaluated and cleared around midnight. Transferred to the psychiatric facility this morning after becoming agitated with zyprexa/ativan given. She was hypotensive and returned to us here, and has been bradycardic. I have been on the phone with poison center who stated peak action would be 6-8 hrs, however after discussion with tox, seems more consistent with bb overdose and could have delayed effect 24 hours out. Complicating this is reported meth abuse with last use yesterday. Repeat labwork is ordered, she is trialed on atropine/calcium and some slight improvement in her HR now to the low 70s, still mildly hypotensive on levophed peripherally at this point. Tox recommended change to epi gtt. If she shows signs of cardiac dysfunction, high dose insulin would be the next step.     Patient bradycardic to 30's and hypotensive to  70/41, received atropine 1 mg and Calcium gluconate and started on epinephrine gtt. Poison control consulted and recs made.       * No surgery found *      Hospital Course:   Admitted after intentional BB o/d (80-90 pills). Initially patient was bradycardic to 30's and hypotensive to 70/41, received atropine 1 mg and Calcium gluconate and started on epinephrine gtt. Poison control consulted and recs made. Also with Methamphetamine in her system. Took pills in an attempt to prevent her BF from leaving her. Crital Care consulted, appreciate assistance.     HR and BP recovered. Psych has re-evaluated patient, and patient is going to in psych facility. Started patient on Depakote for mood stabilization.     Goals of Care Treatment Preferences:  Code Status: Full Code      SDOH Screening:  The patient was screened for utility difficulties, food insecurity, transport difficulties, housing insecurity, and interpersonal safety and there were no concerns identified this admission.     Consults:   Consults (From admission, onward)          Status Ordering Provider     Inpatient consult to Telemedicine - Psych  Once        Provider:  Austen Galarza MD    Acknowledged DAVID VELÁSQUEZ     Inpatient consult to PICC team (\[facility] [place] [facility]\"")  Once        Provider:  (Not yet assigned)    Completed DAVID VELÁSQUEZ     Inpatient consult to Critical Care Medicine  Once        Provider:  David Velásquez MD    Completed DAVID VELÁSQUEZ     Inpatient consult to Telemedicine - Psychiatry  Once        Provider:  (Not yet assigned)    Completed JESSICA AVALOS            No new Assessment & Plan notes have been filed under this hospital service since the last note was generated.  Service: Hospital Medicine    Final Active Diagnoses:    Diagnosis Date Noted POA    PRINCIPAL PROBLEM:  Intentional overdose of beta-adrenergic blocking drug [T44.7X2A] 10/22/2024 Yes    Cardiogenic shock [R57.0] 10/22/2024 Yes    Suicide attempt [T14.91XA] 10/22/2024 Yes     Polysubstance abuse [F19.10] 07/10/2020 Yes    Pseudoseizures [F44.5] 07/29/2013 Yes     Chronic    Cerebral palsy [G80.9] 07/08/2013 Yes    Personality disorder [F60.9] 09/30/2012 Yes     Chronic      Problems Resolved During this Admission:       Discharged Condition: good    Disposition: Psychiatric Hospital    Follow Up:    Patient Instructions:   No discharge procedures on file.    Significant Diagnostic Studies:   Recent Results (from the past 100 hours)   POCT glucose    Collection Time: 10/21/24  6:22 PM   Result Value Ref Range    POCT Glucose 129 (H) 70 - 110 mg/dL   CBC auto differential    Collection Time: 10/21/24  6:30 PM   Result Value Ref Range    WBC 11.39 3.90 - 12.70 K/uL    RBC 4.84 4.00 - 5.40 M/uL    Hemoglobin 15.3 12.0 - 16.0 g/dL    Hematocrit 44.5 37.0 - 48.5 %    MCV 92 82 - 98 fL    MCH 31.6 (H) 27.0 - 31.0 pg    MCHC 34.4 32.0 - 36.0 g/dL    RDW 11.8 11.5 - 14.5 %    Platelets 393 150 - 450 K/uL    MPV 8.4 (L) 9.2 - 12.9 fL    Immature Granulocytes 0.2 0.0 - 0.5 %    Gran # (ANC) 8.2 (H) 1.8 - 7.7 K/uL    Immature Grans (Abs) 0.02 0.00 - 0.04 K/uL    Lymph # 2.4 1.0 - 4.8 K/uL    Mono # 0.7 0.3 - 1.0 K/uL    Eos # 0.1 0.0 - 0.5 K/uL    Baso # 0.08 0.00 - 0.20 K/uL    nRBC 0 0 /100 WBC    Gran % 71.7 38.0 - 73.0 %    Lymph % 21.0 18.0 - 48.0 %    Mono % 6.0 4.0 - 15.0 %    Eosinophil % 0.4 0.0 - 8.0 %    Basophil % 0.7 0.0 - 1.9 %    Differential Method Automated    Comprehensive metabolic panel    Collection Time: 10/21/24  6:30 PM   Result Value Ref Range    Sodium 139 136 - 145 mmol/L    Potassium 4.2 3.5 - 5.1 mmol/L    Chloride 105 95 - 110 mmol/L    CO2 24 23 - 29 mmol/L    Glucose 106 70 - 110 mg/dL    BUN 15 6 - 20 mg/dL    Creatinine 0.9 0.5 - 1.4 mg/dL    Calcium 10.2 8.7 - 10.5 mg/dL    Total Protein 8.5 (H) 6.0 - 8.4 g/dL    Albumin 4.9 3.5 - 5.2 g/dL    Total Bilirubin 0.8 0.1 - 1.0 mg/dL    Alkaline Phosphatase 63 40 - 150 U/L    AST 46 (H) 10 - 40 U/L    ALT 43 10 - 44 U/L     eGFR >60 >60 mL/min/1.73 m^2    Anion Gap 10 8 - 16 mmol/L   TSH    Collection Time: 10/21/24  6:30 PM   Result Value Ref Range    TSH 3.173 0.400 - 4.000 uIU/mL   Ethanol    Collection Time: 10/21/24  6:30 PM   Result Value Ref Range    Alcohol, Serum <10 <10 mg/dL   Acetaminophen level    Collection Time: 10/21/24  6:30 PM   Result Value Ref Range    Acetaminophen (Tylenol), Serum <3.0 (L) 10.0 - 20.0 ug/mL   Salicylate level    Collection Time: 10/21/24  6:30 PM   Result Value Ref Range    Salicylate Lvl <5.0 (L) 15.0 - 30.0 mg/dL   HCG, Quantitative    Collection Time: 10/21/24  6:30 PM   Result Value Ref Range    HCG Quant <1.2 See Text mIU/mL   POCT COVID-19 Rapid Screening    Collection Time: 10/21/24  7:13 PM   Result Value Ref Range    POC Rapid COVID Negative Negative     Acceptable Yes    Urinalysis, Reflex to Urine Culture Urine, Clean Catch    Collection Time: 10/21/24  7:25 PM    Specimen: Urine   Result Value Ref Range    Specimen UA Urine, Clean Catch     Color, UA Yellow Yellow, Straw, Maia    Appearance, UA Hazy (A) Clear    pH, UA 7.0 5.0 - 8.0    Specific Gravity, UA 1.010 1.005 - 1.030    Protein, UA Negative Negative    Glucose, UA Negative Negative    Ketones, UA Negative Negative    Bilirubin (UA) Negative Negative    Occult Blood UA Negative Negative    Nitrite, UA Negative Negative    Urobilinogen, UA Negative <2.0 EU/dL    Leukocytes, UA Trace (A) Negative   Drug screen panel, emergency    Collection Time: 10/21/24  7:25 PM   Result Value Ref Range    Benzodiazepines Negative Negative    Methadone metabolites Negative Negative    Cocaine (Metab.) Negative Negative    Opiate Scrn, Ur Negative Negative    Barbiturate Screen, Ur Negative Negative    Amphetamine Screen, Ur Presumptive Positive (A) Negative    THC Negative Negative    Phencyclidine Negative Negative    Creatinine, Urine 40.2 15.0 - 325.0 mg/dL    Toxicology Information SEE COMMENT    Urinalysis Microscopic     Collection Time: 10/21/24  7:25 PM   Result Value Ref Range    WBC, UA 3 0 - 5 /hpf    Squam Epithel, UA 14 /hpf    Microscopic Comment SEE COMMENT    POCT urine pregnancy    Collection Time: 10/21/24  7:26 PM   Result Value Ref Range    POC Preg Test, Ur Negative Negative     Acceptable Yes    POCT glucose    Collection Time: 10/22/24 10:39 AM   Result Value Ref Range    POCT Glucose 86 70 - 110 mg/dL   EKG 12-lead    Collection Time: 10/22/24 11:09 AM   Result Value Ref Range    QRS Duration 80 ms    OHS QTC Calculation 435 ms   Troponin I    Collection Time: 10/22/24 11:27 AM   Result Value Ref Range    Troponin I <0.006 0.000 - 0.026 ng/mL   Lactic acid, plasma    Collection Time: 10/22/24  1:36 PM   Result Value Ref Range    Lactate (Lactic Acid) 0.8 0.5 - 2.2 mmol/L   Brain natriuretic peptide    Collection Time: 10/22/24  1:36 PM   Result Value Ref Range    BNP 42 0 - 99 pg/mL   CBC auto differential    Collection Time: 10/22/24  1:36 PM   Result Value Ref Range    WBC 7.23 3.90 - 12.70 K/uL    RBC 4.69 4.00 - 5.40 M/uL    Hemoglobin 14.7 12.0 - 16.0 g/dL    Hematocrit 44.4 37.0 - 48.5 %    MCV 95 82 - 98 fL    MCH 31.3 (H) 27.0 - 31.0 pg    MCHC 33.1 32.0 - 36.0 g/dL    RDW 12.0 11.5 - 14.5 %    Platelets 301 150 - 450 K/uL    MPV 8.4 (L) 9.2 - 12.9 fL    Immature Granulocytes 0.6 (H) 0.0 - 0.5 %    Gran # (ANC) 4.0 1.8 - 7.7 K/uL    Immature Grans (Abs) 0.04 0.00 - 0.04 K/uL    Lymph # 2.4 1.0 - 4.8 K/uL    Mono # 0.5 0.3 - 1.0 K/uL    Eos # 0.2 0.0 - 0.5 K/uL    Baso # 0.07 0.00 - 0.20 K/uL    nRBC 0 0 /100 WBC    Gran % 55.2 38.0 - 73.0 %    Lymph % 33.1 18.0 - 48.0 %    Mono % 7.5 4.0 - 15.0 %    Eosinophil % 2.6 0.0 - 8.0 %    Basophil % 1.0 0.0 - 1.9 %    Differential Method Automated    Comprehensive metabolic panel    Collection Time: 10/22/24  1:36 PM   Result Value Ref Range    Sodium 141 136 - 145 mmol/L    Potassium 4.7 3.5 - 5.1 mmol/L    Chloride 111 (H) 95 - 110 mmol/L    CO2 24  23 - 29 mmol/L    Glucose 130 (H) 70 - 110 mg/dL    BUN 10 6 - 20 mg/dL    Creatinine 0.8 0.5 - 1.4 mg/dL    Calcium 10.4 8.7 - 10.5 mg/dL    Total Protein 7.6 6.0 - 8.4 g/dL    Albumin 4.3 3.5 - 5.2 g/dL    Total Bilirubin 1.2 (H) 0.1 - 1.0 mg/dL    Alkaline Phosphatase 55 40 - 150 U/L    AST 37 10 - 40 U/L    ALT 43 10 - 44 U/L    eGFR >60 >60 mL/min/1.73 m^2    Anion Gap 6 (L) 8 - 16 mmol/L   Magnesium    Collection Time: 10/22/24  1:36 PM   Result Value Ref Range    Magnesium 2.0 1.6 - 2.6 mg/dL   Phosphorus    Collection Time: 10/22/24  1:36 PM   Result Value Ref Range    Phosphorus 2.7 2.7 - 4.5 mg/dL   EKG 12-lead    Collection Time: 10/22/24  2:34 PM   Result Value Ref Range    QRS Duration 68 ms    OHS QTC Calculation 415 ms   ISTAT PROCEDURE    Collection Time: 10/22/24  5:35 PM   Result Value Ref Range    POC PH 7.283 (LL) 7.35 - 7.45    POC PCO2 52.2 (H) 35 - 45 mmHg    POC PO2 42 40 - 60 mmHg    POC HCO3 24.7 24 - 28 mmol/L    POC BE -2 -2 to 2 mmol/L    POC SATURATED O2 71 95 - 100 %    POC TCO2 26 24 - 29 mmol/L    Sample VENOUS     Site Other     Allens Test N/A    Lactic acid, plasma    Collection Time: 10/22/24  5:36 PM   Result Value Ref Range    Lactate (Lactic Acid) 0.7 0.5 - 2.2 mmol/L   CBC Auto Differential    Collection Time: 10/23/24  4:35 AM   Result Value Ref Range    WBC 15.39 (H) 3.90 - 12.70 K/uL    RBC 4.20 4.00 - 5.40 M/uL    Hemoglobin 13.2 12.0 - 16.0 g/dL    Hematocrit 38.0 37.0 - 48.5 %    MCV 91 82 - 98 fL    MCH 31.4 (H) 27.0 - 31.0 pg    MCHC 34.7 32.0 - 36.0 g/dL    RDW 11.4 (L) 11.5 - 14.5 %    Platelets 356 150 - 450 K/uL    MPV 8.6 (L) 9.2 - 12.9 fL    Immature Granulocytes 0.4 0.0 - 0.5 %    Gran # (ANC) 12.3 (H) 1.8 - 7.7 K/uL    Immature Grans (Abs) 0.06 (H) 0.00 - 0.04 K/uL    Lymph # 2.1 1.0 - 4.8 K/uL    Mono # 0.9 0.3 - 1.0 K/uL    Eos # 0.0 0.0 - 0.5 K/uL    Baso # 0.06 0.00 - 0.20 K/uL    nRBC 0 0 /100 WBC    Gran % 79.8 (H) 38.0 - 73.0 %    Lymph % 13.7 (L) 18.0  - 48.0 %    Mono % 5.5 4.0 - 15.0 %    Eosinophil % 0.2 0.0 - 8.0 %    Basophil % 0.4 0.0 - 1.9 %    Differential Method Automated    Comprehensive Metabolic Panel    Collection Time: 10/23/24  4:35 AM   Result Value Ref Range    Sodium 139 136 - 145 mmol/L    Potassium 4.0 3.5 - 5.1 mmol/L    Chloride 107 95 - 110 mmol/L    CO2 24 23 - 29 mmol/L    Glucose 169 (H) 70 - 110 mg/dL    BUN 8 6 - 20 mg/dL    Creatinine 0.8 0.5 - 1.4 mg/dL    Calcium 8.7 8.7 - 10.5 mg/dL    Total Protein 6.3 6.0 - 8.4 g/dL    Albumin 3.8 3.5 - 5.2 g/dL    Total Bilirubin 0.9 0.1 - 1.0 mg/dL    Alkaline Phosphatase 47 40 - 150 U/L    AST 24 10 - 40 U/L    ALT 32 10 - 44 U/L    eGFR >60 >60 mL/min/1.73 m^2    Anion Gap 8 8 - 16 mmol/L   Fentanyl, Urine    Collection Time: 10/23/24  5:52 AM   Result Value Ref Range    Fentanyl, Urine Negative Negative    Creatinine, Urine 65.9 15.0 - 325.0 mg/dL   CBC Auto Differential    Collection Time: 10/24/24  4:43 AM   Result Value Ref Range    WBC 8.99 3.90 - 12.70 K/uL    RBC 4.36 4.00 - 5.40 M/uL    Hemoglobin 13.7 12.0 - 16.0 g/dL    Hematocrit 40.4 37.0 - 48.5 %    MCV 93 82 - 98 fL    MCH 31.4 (H) 27.0 - 31.0 pg    MCHC 33.9 32.0 - 36.0 g/dL    RDW 11.7 11.5 - 14.5 %    Platelets 298 150 - 450 K/uL    MPV 8.4 (L) 9.2 - 12.9 fL    Immature Granulocytes 0.2 0.0 - 0.5 %    Gran # (ANC) 5.5 1.8 - 7.7 K/uL    Immature Grans (Abs) 0.02 0.00 - 0.04 K/uL    Lymph # 2.5 1.0 - 4.8 K/uL    Mono # 0.7 0.3 - 1.0 K/uL    Eos # 0.2 0.0 - 0.5 K/uL    Baso # 0.06 0.00 - 0.20 K/uL    nRBC 0 0 /100 WBC    Gran % 61.1 38.0 - 73.0 %    Lymph % 28.3 18.0 - 48.0 %    Mono % 7.6 4.0 - 15.0 %    Eosinophil % 2.1 0.0 - 8.0 %    Basophil % 0.7 0.0 - 1.9 %    Differential Method Automated    Comprehensive Metabolic Panel    Collection Time: 10/24/24  4:43 AM   Result Value Ref Range    Sodium 139 136 - 145 mmol/L    Potassium 3.7 3.5 - 5.1 mmol/L    Chloride 109 95 - 110 mmol/L    CO2 22 (L) 23 - 29 mmol/L    Glucose 154  (H) 70 - 110 mg/dL    BUN 9 6 - 20 mg/dL    Creatinine 0.7 0.5 - 1.4 mg/dL    Calcium 9.0 8.7 - 10.5 mg/dL    Total Protein 6.5 6.0 - 8.4 g/dL    Albumin 3.7 3.5 - 5.2 g/dL    Total Bilirubin 0.4 0.1 - 1.0 mg/dL    Alkaline Phosphatase 49 40 - 150 U/L    AST 17 10 - 40 U/L    ALT 26 10 - 44 U/L    eGFR >60 >60 mL/min/1.73 m^2    Anion Gap 8 8 - 16 mmol/L   CBC Auto Differential    Collection Time: 10/25/24  5:05 AM   Result Value Ref Range    WBC 8.37 3.90 - 12.70 K/uL    RBC 4.74 4.00 - 5.40 M/uL    Hemoglobin 14.8 12.0 - 16.0 g/dL    Hematocrit 44.5 37.0 - 48.5 %    MCV 94 82 - 98 fL    MCH 31.2 (H) 27.0 - 31.0 pg    MCHC 33.3 32.0 - 36.0 g/dL    RDW 11.8 11.5 - 14.5 %    Platelets 271 150 - 450 K/uL    MPV 8.6 (L) 9.2 - 12.9 fL    Immature Granulocytes 0.4 0.0 - 0.5 %    Gran # (ANC) 5.4 1.8 - 7.7 K/uL    Immature Grans (Abs) 0.03 0.00 - 0.04 K/uL    Lymph # 2.0 1.0 - 4.8 K/uL    Mono # 0.6 0.3 - 1.0 K/uL    Eos # 0.3 0.0 - 0.5 K/uL    Baso # 0.05 0.00 - 0.20 K/uL    nRBC 0 0 /100 WBC    Gran % 64.9 38.0 - 73.0 %    Lymph % 24.4 18.0 - 48.0 %    Mono % 6.7 4.0 - 15.0 %    Eosinophil % 3.0 0.0 - 8.0 %    Basophil % 0.6 0.0 - 1.9 %    Differential Method Automated    Comprehensive Metabolic Panel    Collection Time: 10/25/24  5:05 AM   Result Value Ref Range    Sodium 141 136 - 145 mmol/L    Potassium 4.2 3.5 - 5.1 mmol/L    Chloride 105 95 - 110 mmol/L    CO2 27 23 - 29 mmol/L    Glucose 90 70 - 110 mg/dL    BUN 12 6 - 20 mg/dL    Creatinine 0.6 0.5 - 1.4 mg/dL    Calcium 10.0 8.7 - 10.5 mg/dL    Total Protein 6.8 6.0 - 8.4 g/dL    Albumin 3.7 3.5 - 5.2 g/dL    Total Bilirubin 0.4 0.1 - 1.0 mg/dL    Alkaline Phosphatase 45 40 - 150 U/L    AST 15 10 - 40 U/L    ALT 21 10 - 44 U/L    eGFR >60 >60 mL/min/1.73 m^2    Anion Gap 9 8 - 16 mmol/L       Microbiology Results (last 7 days)       ** No results found for the last 168 hours. **            Imaging Results              X-Ray Chest AP Portable (Final result)   Result time 10/21/24 18:37:52      Final result by Teodora Sears MD (10/21/24 18:37:52)                   Impression:      No acute cardiopulmonary process identified.      Electronically signed by: Teodora Sears MD  Date:    10/21/2024  Time:    18:37               Narrative:    EXAMINATION:  XR CHEST AP PORTABLE    CLINICAL HISTORY:  Poisoning by unspecified drugs, medicaments and biological substances, accidental (unintentional), initial encounter    TECHNIQUE:  Single frontal view of the chest was performed.    COMPARISON:  April 2023.    FINDINGS:  Cardiac silhouette is normal in size.  Lungs are symmetrically expanded.  No evidence of focal consolidative process, pneumothorax, or significant pleural effusion.  No acute osseous abnormality identified.                                          Pending Diagnostic Studies:       None           Medications:  Reconciled Home Medications:      Medication List        START taking these medications      valproic acid 250 mg capsule  Commonly known as: DEPAKENE  Take 1 capsule (250 mg total) by mouth 2 (two) times daily.            CONTINUE taking these medications      acetaminophen 325 MG tablet  Commonly known as: TYLENOL  Take 2 tablets (650 mg total) by mouth every 6 (six) hours as needed for Pain.     PNV,calcium 72-iron-folic acid 27 mg iron- 1 mg Tab  Commonly known as: PRENATAL VITAMIN PLUS LOW IRON  Take one tablet daily.  Prescribe prenatal covered by insurance     valACYclovir 500 MG tablet  Commonly known as: VALTREX  Take 1 tablet (500 mg total) by mouth 2 (two) times daily.            STOP taking these medications      erythromycin ophthalmic ointment  Commonly known as: ROMYCIN              Indwelling Lines/Drains at time of discharge:   Lines/Drains/Airways       None                   Time spent on the discharge of patient: 35 minutes    Critical care time spent on the evaluation and treatment of severe organ dysfunction, review of pertinent labs  and imaging studies, discussions with consulting providers and discussions with patient/family: 0 minutes.     David Nieves MD  Department of Hospital Medicine  [facility] - Intensive Care

## 2024-10-26 PROBLEM — Z87.898 HISTORY OF SEIZURE: Status: ACTIVE | Noted: 2024-10-26

## 2024-10-26 PROBLEM — R51.9 FREQUENT HEADACHES: Status: ACTIVE | Noted: 2024-10-26

## 2024-10-26 PROBLEM — E28.2 PCOS (POLYCYSTIC OVARIAN SYNDROME): Status: ACTIVE | Noted: 2024-10-26

## 2024-10-26 PROBLEM — Z13.9 ENCOUNTER FOR MEDICAL SCREENING EXAMINATION: Status: ACTIVE | Noted: 2022-10-10

## 2025-07-12 ENCOUNTER — NURSE TRIAGE (OUTPATIENT)
Dept: ADMINISTRATIVE | Facility: CLINIC | Age: 30
End: 2025-07-12
Payer: MEDICAID

## 2025-07-12 NOTE — TELEPHONE ENCOUNTER
Caller c/o sore throat, eye burning, brain fog,  mild dizziness and fatigue s/p possible black mold exposure. Caller states she is experiencing double vision.  Pt advised per protocol and verbalized understanding.   Reason for Disposition   Loss of vision or double vision  (Exception: Similar to previous migraines.)    Additional Information   Negative: Difficult to awaken or acting confused (e.g., disoriented, slurred speech)   Negative: [1] Loss of speech or garbled speech AND [2] sudden onset AND [3] present now   Negative: Overdose (accidental or intentional) of medications   Negative: [1] Fainted > 15 minutes ago AND [2] still feels too weak or dizzy to stand   Negative: Heart beating < 50 beats per minute OR > 140 beats per minute   Negative: Sounds like a life-threatening emergency to the triager   Negative: SEVERE difficulty breathing (e.g., struggling for each breath, speaks in single words)   Negative: [1] Difficulty breathing or swallowing AND [2] started suddenly after medicine, an allergic food or bee sting   Negative: Shock suspected (e.g., cold/pale/clammy skin, too weak to stand, low BP, rapid pulse)   Negative: [1] Weakness (i.e., paralysis, loss of muscle strength) of the face, arm or leg on one side of the body AND [2] sudden onset AND [3] present now   Negative: [1] Numbness (i.e., loss of sensation) of the face, arm or leg on one side of the body AND [2] sudden onset AND [3] present now   Negative: [1] Weakness (i.e., paralysis, loss of muscle strength) of the face, arm / hand, or leg / foot on one side of the body AND [2] sudden onset AND [3] brief (now gone)   Negative: [1] Numbness (i.e., loss of sensation) of the face, arm / hand, or leg / foot on one side of the body AND [2] sudden onset AND [3] brief (now gone)   Negative: [1] Loss of speech or garbled speech AND [2] sudden onset AND [3] brief (now gone)    Protocols used: Dizziness - [id number]